# Patient Record
Sex: FEMALE | Race: WHITE | NOT HISPANIC OR LATINO | Employment: PART TIME | URBAN - METROPOLITAN AREA
[De-identification: names, ages, dates, MRNs, and addresses within clinical notes are randomized per-mention and may not be internally consistent; named-entity substitution may affect disease eponyms.]

---

## 2021-03-31 ENCOUNTER — TRANSCRIBE ORDERS (OUTPATIENT)
Dept: ADMINISTRATIVE | Facility: HOSPITAL | Age: 46
End: 2021-03-31

## 2021-03-31 DIAGNOSIS — R01.1 HEART MURMUR: Primary | ICD-10-CM

## 2021-04-28 ENCOUNTER — HOSPITAL ENCOUNTER (OUTPATIENT)
Dept: CARDIOLOGY | Facility: HOSPITAL | Age: 46
Discharge: HOME | End: 2021-04-28

## 2021-04-28 ENCOUNTER — HOSPITAL ENCOUNTER (INPATIENT)
Facility: HOSPITAL | Age: 46
LOS: 13 days | Discharge: HOME | End: 2021-05-11
Attending: INTERNAL MEDICINE | Admitting: INTERNAL MEDICINE

## 2021-04-28 ENCOUNTER — APPOINTMENT (OUTPATIENT)
Dept: CARDIOLOGY | Facility: HOSPITAL | Age: 46
End: 2021-04-28

## 2021-04-28 ENCOUNTER — APPOINTMENT (OUTPATIENT)
Dept: CT IMAGING | Facility: HOSPITAL | Age: 46
End: 2021-04-28

## 2021-04-28 VITALS
HEIGHT: 64 IN | SYSTOLIC BLOOD PRESSURE: 123 MMHG | DIASTOLIC BLOOD PRESSURE: 85 MMHG | BODY MASS INDEX: 48.49 KG/M2 | HEART RATE: 76 BPM | WEIGHT: 284 LBS

## 2021-04-28 DIAGNOSIS — Z95.2 S/P MVR (MITRAL VALVE REPLACEMENT): ICD-10-CM

## 2021-04-28 DIAGNOSIS — Z95.2 S/P AVR (AORTIC VALVE REPLACEMENT): ICD-10-CM

## 2021-04-28 DIAGNOSIS — R01.1 HEART MURMUR: ICD-10-CM

## 2021-04-28 DIAGNOSIS — I35.0 NONRHEUMATIC AORTIC VALVE STENOSIS: Primary | ICD-10-CM

## 2021-04-28 LAB
ALBUMIN SERPL-MCNC: 3.6 G/DL (ref 3.5–5.2)
ALBUMIN/GLOB SERPL: 1.1 G/DL
ALP SERPL-CCNC: 110 U/L (ref 39–117)
ALT SERPL W P-5'-P-CCNC: 17 U/L (ref 1–33)
ANION GAP SERPL CALCULATED.3IONS-SCNC: 10.7 MMOL/L (ref 5–15)
AORTIC ARCH: 2.9 CM
AORTIC DIMENSIONLESS INDEX: 0.2 (DI)
APTT PPP: 168.6 SECONDS (ref 22.7–35.4)
APTT PPP: 28.3 SECONDS (ref 22.7–35.4)
ASCENDING AORTA: 2.9 CM
AST SERPL-CCNC: 21 U/L (ref 1–32)
BASOPHILS # BLD AUTO: 0.03 10*3/MM3 (ref 0–0.2)
BASOPHILS NFR BLD AUTO: 0.5 % (ref 0–1.5)
BH CV ECHO MEAS - ACS: 1 CM
BH CV ECHO MEAS - AO ARCH DIAM (PROXIMAL TRANS.): 2.9 CM
BH CV ECHO MEAS - AO MAX PG (FULL): 92.8 MMHG
BH CV ECHO MEAS - AO MAX PG: 96 MMHG
BH CV ECHO MEAS - AO MEAN PG (FULL): 52 MMHG
BH CV ECHO MEAS - AO MEAN PG: 54 MMHG
BH CV ECHO MEAS - AO ROOT AREA (BSA CORRECTED): 1.2
BH CV ECHO MEAS - AO ROOT AREA: 5.7 CM^2
BH CV ECHO MEAS - AO ROOT DIAM: 2.7 CM
BH CV ECHO MEAS - AO V2 MAX: 489 CM/SEC
BH CV ECHO MEAS - AO V2 MEAN: 354 CM/SEC
BH CV ECHO MEAS - AO V2 VTI: 126 CM
BH CV ECHO MEAS - ASC AORTA: 2.9 CM
BH CV ECHO MEAS - AVA(I,A): 0.56 CM^2
BH CV ECHO MEAS - AVA(I,D): 0.56 CM^2
BH CV ECHO MEAS - AVA(V,A): 0.54 CM^2
BH CV ECHO MEAS - AVA(V,D): 0.54 CM^2
BH CV ECHO MEAS - BSA(HAYCOCK): 2.5 M^2
BH CV ECHO MEAS - BSA: 2.3 M^2
BH CV ECHO MEAS - BZI_BMI: 48.7 KILOGRAMS/M^2
BH CV ECHO MEAS - BZI_METRIC_HEIGHT: 162.6 CM
BH CV ECHO MEAS - BZI_METRIC_WEIGHT: 128.8 KG
BH CV ECHO MEAS - EDV(CUBED): 85.2 ML
BH CV ECHO MEAS - EDV(MOD-SP2): 118 ML
BH CV ECHO MEAS - EDV(MOD-SP4): 106 ML
BH CV ECHO MEAS - EDV(TEICH): 87.7 ML
BH CV ECHO MEAS - EF(CUBED): 61.5 %
BH CV ECHO MEAS - EF(MOD-BP): 64 %
BH CV ECHO MEAS - EF(MOD-SP2): 61.9 %
BH CV ECHO MEAS - EF(MOD-SP4): 67 %
BH CV ECHO MEAS - EF(TEICH): 53.3 %
BH CV ECHO MEAS - ESV(CUBED): 32.8 ML
BH CV ECHO MEAS - ESV(MOD-SP2): 45 ML
BH CV ECHO MEAS - ESV(MOD-SP4): 35 ML
BH CV ECHO MEAS - ESV(TEICH): 41 ML
BH CV ECHO MEAS - FS: 27.3 %
BH CV ECHO MEAS - IVS/LVPW: 1.1
BH CV ECHO MEAS - IVSD: 1.3 CM
BH CV ECHO MEAS - LAT PEAK E' VEL: 5 CM/SEC
BH CV ECHO MEAS - LV DIASTOLIC VOL/BSA (35-75): 46.7 ML/M^2
BH CV ECHO MEAS - LV MASS(C)D: 203 GRAMS
BH CV ECHO MEAS - LV MASS(C)DI: 89.4 GRAMS/M^2
BH CV ECHO MEAS - LV MAX PG: 2.8 MMHG
BH CV ECHO MEAS - LV MEAN PG: 2 MMHG
BH CV ECHO MEAS - LV SYSTOLIC VOL/BSA (12-30): 15.4 ML/M^2
BH CV ECHO MEAS - LV V1 MAX: 83.9 CM/SEC
BH CV ECHO MEAS - LV V1 MEAN: 59.4 CM/SEC
BH CV ECHO MEAS - LV V1 VTI: 22.4 CM
BH CV ECHO MEAS - LVIDD: 4.4 CM
BH CV ECHO MEAS - LVIDS: 3.2 CM
BH CV ECHO MEAS - LVLD AP2: 8.8 CM
BH CV ECHO MEAS - LVLD AP4: 8.1 CM
BH CV ECHO MEAS - LVLS AP2: 7.3 CM
BH CV ECHO MEAS - LVLS AP4: 6.4 CM
BH CV ECHO MEAS - LVOT AREA (M): 3.1 CM^2
BH CV ECHO MEAS - LVOT AREA: 3.1 CM^2
BH CV ECHO MEAS - LVOT DIAM: 2 CM
BH CV ECHO MEAS - LVPWD: 1.2 CM
BH CV ECHO MEAS - MED PEAK E' VEL: 7.3 CM/SEC
BH CV ECHO MEAS - MV A DUR: 0.19 SEC
BH CV ECHO MEAS - MV A MAX VEL: 145 CM/SEC
BH CV ECHO MEAS - MV DEC SLOPE: 535 CM/SEC^2
BH CV ECHO MEAS - MV DEC TIME: 0.18 SEC
BH CV ECHO MEAS - MV E MAX VEL: 154 CM/SEC
BH CV ECHO MEAS - MV E/A: 1.1
BH CV ECHO MEAS - MV MAX PG: 11 MMHG
BH CV ECHO MEAS - MV MEAN PG: 4 MMHG
BH CV ECHO MEAS - MV MEAN PG: 7 MMHG
BH CV ECHO MEAS - MV P1/2T MAX VEL: 163 CM/SEC
BH CV ECHO MEAS - MV P1/2T: 89.2 MSEC
BH CV ECHO MEAS - MV V2 MAX: 168 CM/SEC
BH CV ECHO MEAS - MV V2 MEAN: 122 CM/SEC
BH CV ECHO MEAS - MV V2 VTI: 41.2 CM
BH CV ECHO MEAS - MVA P1/2T LCG: 1.3 CM^2
BH CV ECHO MEAS - MVA(P1/2T): 2.5 CM^2
BH CV ECHO MEAS - MVA(VTI): 1.7 CM^2
BH CV ECHO MEAS - PA ACC TIME: 0.11 SEC
BH CV ECHO MEAS - PA MAX PG (FULL): 3.2 MMHG
BH CV ECHO MEAS - PA MAX PG: 4.8 MMHG
BH CV ECHO MEAS - PA PR(ACCEL): 29.1 MMHG
BH CV ECHO MEAS - PA V2 MAX: 110 CM/SEC
BH CV ECHO MEAS - PI END-D VEL: 179 CM/SEC
BH CV ECHO MEAS - PULM A REVS DUR: 0.19 SEC
BH CV ECHO MEAS - PULM A REVS VEL: 33.1 CM/SEC
BH CV ECHO MEAS - PULM DIAS VEL: 75.8 CM/SEC
BH CV ECHO MEAS - PULM S/D: 0.59
BH CV ECHO MEAS - PULM SYS VEL: 44.9 CM/SEC
BH CV ECHO MEAS - PVA(V,A): 1.3 CM^2
BH CV ECHO MEAS - PVA(V,D): 1.3 CM^2
BH CV ECHO MEAS - QP/QS: 0.56
BH CV ECHO MEAS - RAP SYSTOLE: 15 MMHG
BH CV ECHO MEAS - RV MAX PG: 1.6 MMHG
BH CV ECHO MEAS - RV MEAN PG: 1 MMHG
BH CV ECHO MEAS - RV V1 MAX: 63.3 CM/SEC
BH CV ECHO MEAS - RV V1 MEAN: 46.4 CM/SEC
BH CV ECHO MEAS - RV V1 VTI: 17.3 CM
BH CV ECHO MEAS - RVOT AREA: 2.3 CM^2
BH CV ECHO MEAS - RVOT DIAM: 1.7 CM
BH CV ECHO MEAS - RVSP: 44 MMHG
BH CV ECHO MEAS - SI(AO): 317.8 ML/M^2
BH CV ECHO MEAS - SI(CUBED): 23.1 ML/M^2
BH CV ECHO MEAS - SI(LVOT): 31 ML/M^2
BH CV ECHO MEAS - SI(MOD-SP2): 32.2 ML/M^2
BH CV ECHO MEAS - SI(MOD-SP4): 31.3 ML/M^2
BH CV ECHO MEAS - SI(TEICH): 20.6 ML/M^2
BH CV ECHO MEAS - SV(AO): 721.4 ML
BH CV ECHO MEAS - SV(CUBED): 52.4 ML
BH CV ECHO MEAS - SV(LVOT): 70.4 ML
BH CV ECHO MEAS - SV(MOD-SP2): 73 ML
BH CV ECHO MEAS - SV(MOD-SP4): 71 ML
BH CV ECHO MEAS - SV(RVOT): 39.3 ML
BH CV ECHO MEAS - SV(TEICH): 46.7 ML
BH CV ECHO MEAS - TR MAX VEL: 253 CM/SEC
BH CV ECHO MEASUREMENTS AVERAGE E/E' RATIO: 25.04
BH CV XLRA - RV BASE: 3 CM
BH CV XLRA - RV LENGTH: 9.4 CM
BH CV XLRA - RV MID: 2.5 CM
BH CV XLRA - TDI S': 9.8 CM/SEC
BILIRUB SERPL-MCNC: 0.7 MG/DL (ref 0–1.2)
BUN SERPL-MCNC: 15 MG/DL (ref 6–20)
BUN/CREAT SERPL: 19.5 (ref 7–25)
CALCIUM SPEC-SCNC: 8.8 MG/DL (ref 8.6–10.5)
CHLORIDE SERPL-SCNC: 99 MMOL/L (ref 98–107)
CO2 SERPL-SCNC: 28.3 MMOL/L (ref 22–29)
CREAT SERPL-MCNC: 0.77 MG/DL (ref 0.57–1)
DEPRECATED RDW RBC AUTO: 40.3 FL (ref 37–54)
EOSINOPHIL # BLD AUTO: 0.22 10*3/MM3 (ref 0–0.4)
EOSINOPHIL NFR BLD AUTO: 3.6 % (ref 0.3–6.2)
ERYTHROCYTE [DISTWIDTH] IN BLOOD BY AUTOMATED COUNT: 12.8 % (ref 12.3–15.4)
GFR SERPL CREATININE-BSD FRML MDRD: 80 ML/MIN/1.73
GLOBULIN UR ELPH-MCNC: 3.3 GM/DL
GLUCOSE BLDC GLUCOMTR-MCNC: 183 MG/DL (ref 70–130)
GLUCOSE SERPL-MCNC: 188 MG/DL (ref 65–99)
HCT VFR BLD AUTO: 46.3 % (ref 34–46.6)
HGB BLD-MCNC: 15.3 G/DL (ref 12–15.9)
INR PPP: 1.05 (ref 0.9–1.1)
LEFT ATRIUM VOLUME INDEX: 30 ML/M2
LYMPHOCYTES # BLD AUTO: 1.7 10*3/MM3 (ref 0.7–3.1)
LYMPHOCYTES NFR BLD AUTO: 27.6 % (ref 19.6–45.3)
MAXIMAL PREDICTED HEART RATE: 172 BPM
MCH RBC QN AUTO: 29 PG (ref 26.6–33)
MCHC RBC AUTO-ENTMCNC: 33 G/DL (ref 31.5–35.7)
MCV RBC AUTO: 87.9 FL (ref 79–97)
MONOCYTES # BLD AUTO: 0.3 10*3/MM3 (ref 0.1–0.9)
MONOCYTES NFR BLD AUTO: 4.9 % (ref 5–12)
NEUTROPHILS NFR BLD AUTO: 3.89 10*3/MM3 (ref 1.7–7)
NEUTROPHILS NFR BLD AUTO: 63.1 % (ref 42.7–76)
PLATELET # BLD AUTO: 163 10*3/MM3 (ref 140–450)
PMV BLD AUTO: 9.7 FL (ref 6–12)
POTASSIUM SERPL-SCNC: 4.2 MMOL/L (ref 3.5–5.2)
PROT SERPL-MCNC: 6.9 G/DL (ref 6–8.5)
PROTHROMBIN TIME: 13.5 SECONDS (ref 11.7–14.2)
QT INTERVAL: 425 MS
RBC # BLD AUTO: 5.27 10*6/MM3 (ref 3.77–5.28)
SARS-COV-2 RNA RESP QL NAA+PROBE: NOT DETECTED
SINUS: 2.6 CM
SODIUM SERPL-SCNC: 138 MMOL/L (ref 136–145)
STJ: 2.7 CM
STRESS TARGET HR: 146 BPM
TROPONIN T SERPL-MCNC: <0.01 NG/ML (ref 0–0.03)
TSH SERPL DL<=0.05 MIU/L-ACNC: 2.56 UIU/ML (ref 0.27–4.2)
WBC # BLD AUTO: 6.16 10*3/MM3 (ref 3.4–10.8)

## 2021-04-28 PROCEDURE — 71275 CT ANGIOGRAPHY CHEST: CPT

## 2021-04-28 PROCEDURE — 25010000002 HEPARIN (PORCINE) PER 1000 UNITS: Performed by: INTERNAL MEDICINE

## 2021-04-28 PROCEDURE — 99232 SBSQ HOSP IP/OBS MODERATE 35: CPT | Performed by: INTERNAL MEDICINE

## 2021-04-28 PROCEDURE — 87040 BLOOD CULTURE FOR BACTERIA: CPT | Performed by: INTERNAL MEDICINE

## 2021-04-28 PROCEDURE — 93306 TTE W/DOPPLER COMPLETE: CPT

## 2021-04-28 PROCEDURE — 84443 ASSAY THYROID STIM HORMONE: CPT | Performed by: INTERNAL MEDICINE

## 2021-04-28 PROCEDURE — 82962 GLUCOSE BLOOD TEST: CPT

## 2021-04-28 PROCEDURE — 93325 DOPPLER ECHO COLOR FLOW MAPG: CPT | Performed by: INTERNAL MEDICINE

## 2021-04-28 PROCEDURE — G0378 HOSPITAL OBSERVATION PER HR: HCPCS

## 2021-04-28 PROCEDURE — 94799 UNLISTED PULMONARY SVC/PX: CPT

## 2021-04-28 PROCEDURE — 25010000002 FENTANYL CITRATE (PF) 100 MCG/2ML SOLUTION: Performed by: INTERNAL MEDICINE

## 2021-04-28 PROCEDURE — 94640 AIRWAY INHALATION TREATMENT: CPT

## 2021-04-28 PROCEDURE — 85027 COMPLETE CBC AUTOMATED: CPT | Performed by: INTERNAL MEDICINE

## 2021-04-28 PROCEDURE — 93312 ECHO TRANSESOPHAGEAL: CPT

## 2021-04-28 PROCEDURE — 80053 COMPREHEN METABOLIC PANEL: CPT | Performed by: INTERNAL MEDICINE

## 2021-04-28 PROCEDURE — 0 IOPAMIDOL PER 1 ML: Performed by: INTERNAL MEDICINE

## 2021-04-28 PROCEDURE — 74174 CTA ABD&PLVS W/CONTRAST: CPT

## 2021-04-28 PROCEDURE — 93010 ELECTROCARDIOGRAM REPORT: CPT | Performed by: INTERNAL MEDICINE

## 2021-04-28 PROCEDURE — 85730 THROMBOPLASTIN TIME PARTIAL: CPT | Performed by: INTERNAL MEDICINE

## 2021-04-28 PROCEDURE — 93306 TTE W/DOPPLER COMPLETE: CPT | Performed by: INTERNAL MEDICINE

## 2021-04-28 PROCEDURE — U0003 INFECTIOUS AGENT DETECTION BY NUCLEIC ACID (DNA OR RNA); SEVERE ACUTE RESPIRATORY SYNDROME CORONAVIRUS 2 (SARS-COV-2) (CORONAVIRUS DISEASE [COVID-19]), AMPLIFIED PROBE TECHNIQUE, MAKING USE OF HIGH THROUGHPUT TECHNOLOGIES AS DESCRIBED BY CMS-2020-01-R: HCPCS | Performed by: INTERNAL MEDICINE

## 2021-04-28 PROCEDURE — 25010000002 MIDAZOLAM PER 1 MG: Performed by: INTERNAL MEDICINE

## 2021-04-28 PROCEDURE — 93325 DOPPLER ECHO COLOR FLOW MAPG: CPT

## 2021-04-28 PROCEDURE — 93320 DOPPLER ECHO COMPLETE: CPT | Performed by: INTERNAL MEDICINE

## 2021-04-28 PROCEDURE — 93320 DOPPLER ECHO COMPLETE: CPT

## 2021-04-28 PROCEDURE — 99152 MOD SED SAME PHYS/QHP 5/>YRS: CPT

## 2021-04-28 PROCEDURE — 93005 ELECTROCARDIOGRAM TRACING: CPT | Performed by: INTERNAL MEDICINE

## 2021-04-28 PROCEDURE — 84484 ASSAY OF TROPONIN QUANT: CPT | Performed by: INTERNAL MEDICINE

## 2021-04-28 PROCEDURE — 93312 ECHO TRANSESOPHAGEAL: CPT | Performed by: INTERNAL MEDICINE

## 2021-04-28 PROCEDURE — 99153 MOD SED SAME PHYS/QHP EA: CPT

## 2021-04-28 PROCEDURE — 85610 PROTHROMBIN TIME: CPT | Performed by: INTERNAL MEDICINE

## 2021-04-28 RX ORDER — METFORMIN HYDROCHLORIDE 750 MG/1
750 TABLET, EXTENDED RELEASE ORAL 2 TIMES DAILY
COMMUNITY
End: 2022-11-22 | Stop reason: ALTCHOICE

## 2021-04-28 RX ORDER — BUDESONIDE AND FORMOTEROL FUMARATE DIHYDRATE 160; 4.5 UG/1; UG/1
2 AEROSOL RESPIRATORY (INHALATION)
Status: DISCONTINUED | OUTPATIENT
Start: 2021-04-28 | End: 2021-05-03

## 2021-04-28 RX ORDER — MIDAZOLAM HYDROCHLORIDE 1 MG/ML
INJECTION INTRAMUSCULAR; INTRAVENOUS
Status: COMPLETED | OUTPATIENT
Start: 2021-04-28 | End: 2021-04-28

## 2021-04-28 RX ORDER — ONDANSETRON 2 MG/ML
4 INJECTION INTRAMUSCULAR; INTRAVENOUS EVERY 6 HOURS PRN
Status: DISCONTINUED | OUTPATIENT
Start: 2021-04-28 | End: 2021-05-03

## 2021-04-28 RX ORDER — TRIAMTERENE AND HYDROCHLOROTHIAZIDE 37.5; 25 MG/1; MG/1
1 TABLET ORAL DAILY
Status: DISCONTINUED | OUTPATIENT
Start: 2021-04-28 | End: 2021-05-03

## 2021-04-28 RX ORDER — HEPARIN SODIUM 5000 [USP'U]/ML
40-77.5 INJECTION, SOLUTION INTRAVENOUS; SUBCUTANEOUS EVERY 6 HOURS PRN
Status: DISCONTINUED | OUTPATIENT
Start: 2021-04-28 | End: 2021-04-30

## 2021-04-28 RX ORDER — TRIAMTERENE AND HYDROCHLOROTHIAZIDE 37.5; 25 MG/1; MG/1
1 CAPSULE ORAL EVERY MORNING
COMMUNITY
End: 2021-05-11 | Stop reason: HOSPADM

## 2021-04-28 RX ORDER — DEXTROSE MONOHYDRATE 25 G/50ML
25 INJECTION, SOLUTION INTRAVENOUS
Status: DISCONTINUED | OUTPATIENT
Start: 2021-04-28 | End: 2021-05-03

## 2021-04-28 RX ORDER — SODIUM CHLORIDE 0.9 % (FLUSH) 0.9 %
10 SYRINGE (ML) INJECTION EVERY 12 HOURS SCHEDULED
Status: DISCONTINUED | OUTPATIENT
Start: 2021-04-28 | End: 2021-05-03

## 2021-04-28 RX ORDER — FENTANYL CITRATE 50 UG/ML
INJECTION, SOLUTION INTRAMUSCULAR; INTRAVENOUS
Status: COMPLETED | OUTPATIENT
Start: 2021-04-28 | End: 2021-04-28

## 2021-04-28 RX ORDER — NICOTINE 21 MG/24HR
1 PATCH, TRANSDERMAL 24 HOURS TRANSDERMAL
Status: DISCONTINUED | OUTPATIENT
Start: 2021-04-28 | End: 2021-05-03

## 2021-04-28 RX ORDER — SIMVASTATIN 80 MG
80 TABLET ORAL NIGHTLY
COMMUNITY
End: 2021-05-11 | Stop reason: HOSPADM

## 2021-04-28 RX ORDER — ALPRAZOLAM 0.25 MG/1
0.25 TABLET ORAL 4 TIMES DAILY PRN
Status: DISCONTINUED | OUTPATIENT
Start: 2021-04-28 | End: 2021-05-03

## 2021-04-28 RX ORDER — HYDROMORPHONE HYDROCHLORIDE 1 MG/ML
0.5 INJECTION, SOLUTION INTRAMUSCULAR; INTRAVENOUS; SUBCUTANEOUS
Status: DISCONTINUED | OUTPATIENT
Start: 2021-04-28 | End: 2021-05-03

## 2021-04-28 RX ORDER — SODIUM CHLORIDE 9 MG/ML
INJECTION, SOLUTION INTRAVENOUS
Status: COMPLETED | OUTPATIENT
Start: 2021-04-28 | End: 2021-04-28

## 2021-04-28 RX ORDER — AMLODIPINE BESYLATE 2.5 MG/1
2.5 TABLET ORAL DAILY
Status: DISCONTINUED | OUTPATIENT
Start: 2021-04-28 | End: 2021-05-03

## 2021-04-28 RX ORDER — BUDESONIDE AND FORMOTEROL FUMARATE DIHYDRATE 160; 4.5 UG/1; UG/1
2 AEROSOL RESPIRATORY (INHALATION)
COMMUNITY

## 2021-04-28 RX ORDER — AMLODIPINE BESYLATE 2.5 MG/1
2.5 TABLET ORAL DAILY
COMMUNITY
End: 2021-05-11 | Stop reason: HOSPADM

## 2021-04-28 RX ORDER — LIDOCAINE HYDROCHLORIDE 20 MG/ML
SOLUTION OROPHARYNGEAL
Status: COMPLETED | OUTPATIENT
Start: 2021-04-28 | End: 2021-04-28

## 2021-04-28 RX ORDER — NICOTINE POLACRILEX 4 MG
15 LOZENGE BUCCAL
Status: DISCONTINUED | OUTPATIENT
Start: 2021-04-28 | End: 2021-05-03

## 2021-04-28 RX ORDER — HEPARIN SODIUM 10000 [USP'U]/100ML
18 INJECTION, SOLUTION INTRAVENOUS
Status: DISCONTINUED | OUTPATIENT
Start: 2021-04-28 | End: 2021-04-30

## 2021-04-28 RX ORDER — ACETAMINOPHEN 325 MG/1
650 TABLET ORAL EVERY 6 HOURS PRN
Status: DISCONTINUED | OUTPATIENT
Start: 2021-04-28 | End: 2021-05-01

## 2021-04-28 RX ORDER — ATORVASTATIN CALCIUM 20 MG/1
40 TABLET, FILM COATED ORAL DAILY
Status: DISCONTINUED | OUTPATIENT
Start: 2021-04-28 | End: 2021-05-03

## 2021-04-28 RX ORDER — SODIUM CHLORIDE 0.9 % (FLUSH) 0.9 %
10 SYRINGE (ML) INJECTION AS NEEDED
Status: DISCONTINUED | OUTPATIENT
Start: 2021-04-28 | End: 2021-05-03

## 2021-04-28 RX ADMIN — MIDAZOLAM 2 MG: 1 INJECTION INTRAMUSCULAR; INTRAVENOUS at 14:31

## 2021-04-28 RX ADMIN — SODIUM CHLORIDE, PRESERVATIVE FREE 10 ML: 5 INJECTION INTRAVENOUS at 20:20

## 2021-04-28 RX ADMIN — HEPARIN SODIUM 18 UNITS/KG/HR: 10000 INJECTION, SOLUTION INTRAVENOUS at 15:37

## 2021-04-28 RX ADMIN — MIDAZOLAM 2 MG: 1 INJECTION INTRAMUSCULAR; INTRAVENOUS at 14:12

## 2021-04-28 RX ADMIN — ATORVASTATIN CALCIUM 40 MG: 20 TABLET, FILM COATED ORAL at 20:08

## 2021-04-28 RX ADMIN — SODIUM CHLORIDE 50 ML/HR: 9 INJECTION, SOLUTION INTRAVENOUS at 14:03

## 2021-04-28 RX ADMIN — TRIAMTERENE AND HYDROCHLOROTHIAZIDE 1 TABLET: 37.5; 25 TABLET ORAL at 20:15

## 2021-04-28 RX ADMIN — LIDOCAINE HYDROCHLORIDE 10 ML: 20 SOLUTION ORAL; TOPICAL at 14:03

## 2021-04-28 RX ADMIN — MIDAZOLAM 2 MG: 1 INJECTION INTRAMUSCULAR; INTRAVENOUS at 14:11

## 2021-04-28 RX ADMIN — LINAGLIPTIN 5 MG: 5 TABLET, FILM COATED ORAL at 20:15

## 2021-04-28 RX ADMIN — AMLODIPINE BESYLATE 2.5 MG: 2.5 TABLET ORAL at 20:08

## 2021-04-28 RX ADMIN — IOPAMIDOL 95 ML: 755 INJECTION, SOLUTION INTRAVENOUS at 17:56

## 2021-04-28 RX ADMIN — MIDAZOLAM 1 MG: 1 INJECTION INTRAMUSCULAR; INTRAVENOUS at 14:14

## 2021-04-28 RX ADMIN — BUDESONIDE AND FORMOTEROL FUMARATE DIHYDRATE 2 PUFF: 160; 4.5 AEROSOL RESPIRATORY (INHALATION) at 20:32

## 2021-04-28 RX ADMIN — FENTANYL CITRATE 25 MCG: 50 INJECTION INTRAMUSCULAR; INTRAVENOUS at 14:11

## 2021-04-28 RX ADMIN — MIDAZOLAM 1 MG: 1 INJECTION INTRAMUSCULAR; INTRAVENOUS at 14:38

## 2021-04-29 ENCOUNTER — APPOINTMENT (OUTPATIENT)
Dept: CARDIOLOGY | Facility: HOSPITAL | Age: 46
End: 2021-04-29

## 2021-04-29 LAB
ALBUMIN SERPL-MCNC: 3.2 G/DL (ref 3.5–5.2)
ALP SERPL-CCNC: 106 U/L (ref 39–117)
ALT SERPL W P-5'-P-CCNC: 15 U/L (ref 1–33)
ANION GAP SERPL CALCULATED.3IONS-SCNC: 9.5 MMOL/L (ref 5–15)
APTT PPP: 108.7 SECONDS (ref 22.7–35.4)
APTT PPP: 71.4 SECONDS (ref 22.7–35.4)
APTT PPP: 87.1 SECONDS (ref 22.7–35.4)
ARTERIAL PATENCY WRIST A: POSITIVE
AST SERPL-CCNC: 26 U/L (ref 1–32)
ATMOSPHERIC PRESS: 745.1 MMHG
BASE EXCESS BLDA CALC-SCNC: 2.2 MMOL/L (ref 0–2)
BASOPHILS # BLD AUTO: 0.04 10*3/MM3 (ref 0–0.2)
BASOPHILS NFR BLD AUTO: 0.6 % (ref 0–1.5)
BDY SITE: ABNORMAL
BH CV LOWER VASCULAR LEFT COMMON FEMORAL AUGMENT: NORMAL
BH CV LOWER VASCULAR LEFT COMMON FEMORAL COMPETENT: NORMAL
BH CV LOWER VASCULAR LEFT COMMON FEMORAL COMPRESS: NORMAL
BH CV LOWER VASCULAR LEFT COMMON FEMORAL PHASIC: NORMAL
BH CV LOWER VASCULAR LEFT COMMON FEMORAL SPONT: NORMAL
BH CV LOWER VASCULAR LEFT DISTAL FEMORAL COMPRESS: NORMAL
BH CV LOWER VASCULAR LEFT GASTRONEMIUS COMPRESS: NORMAL
BH CV LOWER VASCULAR LEFT GREATER SAPH AK COMPRESS: NORMAL
BH CV LOWER VASCULAR LEFT GREATER SAPH BK COMPRESS: NORMAL
BH CV LOWER VASCULAR LEFT LESSER SAPH COMPRESS: NORMAL
BH CV LOWER VASCULAR LEFT MID FEMORAL AUGMENT: NORMAL
BH CV LOWER VASCULAR LEFT MID FEMORAL COMPETENT: NORMAL
BH CV LOWER VASCULAR LEFT MID FEMORAL COMPRESS: NORMAL
BH CV LOWER VASCULAR LEFT MID FEMORAL PHASIC: NORMAL
BH CV LOWER VASCULAR LEFT MID FEMORAL SPONT: NORMAL
BH CV LOWER VASCULAR LEFT PERONEAL COMPRESS: NORMAL
BH CV LOWER VASCULAR LEFT POPLITEAL AUGMENT: NORMAL
BH CV LOWER VASCULAR LEFT POPLITEAL COMPETENT: NORMAL
BH CV LOWER VASCULAR LEFT POPLITEAL COMPRESS: NORMAL
BH CV LOWER VASCULAR LEFT POPLITEAL PHASIC: NORMAL
BH CV LOWER VASCULAR LEFT POPLITEAL SPONT: NORMAL
BH CV LOWER VASCULAR LEFT POSTERIOR TIBIAL COMPRESS: NORMAL
BH CV LOWER VASCULAR LEFT PROFUNDA FEMORAL COMPRESS: NORMAL
BH CV LOWER VASCULAR LEFT PROXIMAL FEMORAL COMPRESS: NORMAL
BH CV LOWER VASCULAR LEFT SAPHENOFEMORAL JUNCTION COMPRESS: NORMAL
BH CV LOWER VASCULAR RIGHT COMMON FEMORAL AUGMENT: NORMAL
BH CV LOWER VASCULAR RIGHT COMMON FEMORAL COMPETENT: NORMAL
BH CV LOWER VASCULAR RIGHT COMMON FEMORAL COMPRESS: NORMAL
BH CV LOWER VASCULAR RIGHT COMMON FEMORAL PHASIC: NORMAL
BH CV LOWER VASCULAR RIGHT COMMON FEMORAL SPONT: NORMAL
BH CV LOWER VASCULAR RIGHT DISTAL FEMORAL COMPRESS: NORMAL
BH CV LOWER VASCULAR RIGHT GASTRONEMIUS COMPRESS: NORMAL
BH CV LOWER VASCULAR RIGHT GREATER SAPH AK COMPRESS: NORMAL
BH CV LOWER VASCULAR RIGHT GREATER SAPH BK COMPRESS: NORMAL
BH CV LOWER VASCULAR RIGHT LESSER SAPH COMPRESS: NORMAL
BH CV LOWER VASCULAR RIGHT MID FEMORAL AUGMENT: NORMAL
BH CV LOWER VASCULAR RIGHT MID FEMORAL COMPETENT: NORMAL
BH CV LOWER VASCULAR RIGHT MID FEMORAL COMPRESS: NORMAL
BH CV LOWER VASCULAR RIGHT MID FEMORAL PHASIC: NORMAL
BH CV LOWER VASCULAR RIGHT MID FEMORAL SPONT: NORMAL
BH CV LOWER VASCULAR RIGHT PERONEAL COMPRESS: NORMAL
BH CV LOWER VASCULAR RIGHT POPLITEAL AUGMENT: NORMAL
BH CV LOWER VASCULAR RIGHT POPLITEAL COMPETENT: NORMAL
BH CV LOWER VASCULAR RIGHT POPLITEAL COMPRESS: NORMAL
BH CV LOWER VASCULAR RIGHT POPLITEAL PHASIC: NORMAL
BH CV LOWER VASCULAR RIGHT POPLITEAL SPONT: NORMAL
BH CV LOWER VASCULAR RIGHT POSTERIOR TIBIAL COMPRESS: NORMAL
BH CV LOWER VASCULAR RIGHT PROFUNDA FEMORAL COMPRESS: NORMAL
BH CV LOWER VASCULAR RIGHT PROXIMAL FEMORAL COMPRESS: NORMAL
BH CV LOWER VASCULAR RIGHT SAPHENOFEMORAL JUNCTION COMPRESS: NORMAL
BILIRUB CONJ SERPL-MCNC: <0.2 MG/DL (ref 0–0.3)
BILIRUB INDIRECT SERPL-MCNC: ABNORMAL MG/DL
BILIRUB SERPL-MCNC: 0.6 MG/DL (ref 0–1.2)
BILIRUB UR QL STRIP: NEGATIVE
BUN SERPL-MCNC: 12 MG/DL (ref 6–20)
BUN/CREAT SERPL: 17.6 (ref 7–25)
CALCIUM SPEC-SCNC: 8.6 MG/DL (ref 8.6–10.5)
CERULOPLASMIN SERPL-MCNC: 22 MG/DL (ref 19–39)
CHLORIDE SERPL-SCNC: 100 MMOL/L (ref 98–107)
CHOLEST SERPL-MCNC: 178 MG/DL (ref 0–200)
CLARITY UR: CLEAR
CLOSE TME COLL+ADP + EPINEP PNL BLD: 94 % (ref 86–100)
CO2 SERPL-SCNC: 23.5 MMOL/L (ref 22–29)
COLOR UR: YELLOW
CREAT SERPL-MCNC: 0.68 MG/DL (ref 0.57–1)
D DIMER PPP FEU-MCNC: <0.27 MCGFEU/ML (ref 0–0.49)
DEPRECATED RDW RBC AUTO: 39.6 FL (ref 37–54)
EOSINOPHIL # BLD AUTO: 0.25 10*3/MM3 (ref 0–0.4)
EOSINOPHIL NFR BLD AUTO: 3.6 % (ref 0.3–6.2)
ERYTHROCYTE [DISTWIDTH] IN BLOOD BY AUTOMATED COUNT: 12.6 % (ref 12.3–15.4)
GAS FLOW AIRWAY: 2 LPM
GFR SERPL CREATININE-BSD FRML MDRD: 92 ML/MIN/1.73
GLUCOSE BLDC GLUCOMTR-MCNC: 182 MG/DL (ref 70–130)
GLUCOSE BLDC GLUCOMTR-MCNC: 185 MG/DL (ref 70–130)
GLUCOSE BLDC GLUCOMTR-MCNC: 215 MG/DL (ref 70–130)
GLUCOSE SERPL-MCNC: 187 MG/DL (ref 65–99)
GLUCOSE UR STRIP-MCNC: ABNORMAL MG/DL
HBA1C MFR BLD: 8.87 % (ref 4.8–5.6)
HCO3 BLDA-SCNC: 26.7 MMOL/L (ref 22–28)
HCT VFR BLD AUTO: 43.9 % (ref 34–46.6)
HDLC SERPL-MCNC: 34 MG/DL (ref 40–60)
HGB BLD-MCNC: 14.2 G/DL (ref 12–15.9)
HGB UR QL STRIP.AUTO: NEGATIVE
IMM GRANULOCYTES # BLD AUTO: 0.03 10*3/MM3 (ref 0–0.05)
IMM GRANULOCYTES NFR BLD AUTO: 0.4 % (ref 0–0.5)
INR PPP: 1.07 (ref 0.9–1.1)
KETONES UR QL STRIP: NEGATIVE
LDLC SERPL CALC-MCNC: 121 MG/DL (ref 0–100)
LDLC/HDLC SERPL: 3.5 {RATIO}
LEUKOCYTE ESTERASE UR QL STRIP.AUTO: NEGATIVE
LYMPHOCYTES # BLD AUTO: 1.88 10*3/MM3 (ref 0.7–3.1)
LYMPHOCYTES NFR BLD AUTO: 27.2 % (ref 19.6–45.3)
MCH RBC QN AUTO: 28.2 PG (ref 26.6–33)
MCHC RBC AUTO-ENTMCNC: 32.3 G/DL (ref 31.5–35.7)
MCV RBC AUTO: 87.3 FL (ref 79–97)
MODALITY: ABNORMAL
MONOCYTES # BLD AUTO: 0.36 10*3/MM3 (ref 0.1–0.9)
MONOCYTES NFR BLD AUTO: 5.2 % (ref 5–12)
NEUTROPHILS NFR BLD AUTO: 4.36 10*3/MM3 (ref 1.7–7)
NEUTROPHILS NFR BLD AUTO: 63 % (ref 42.7–76)
NITRITE UR QL STRIP: NEGATIVE
NRBC BLD AUTO-RTO: 0 /100 WBC (ref 0–0.2)
PCO2 BLDA: 40.1 MM HG (ref 35–45)
PH BLDA: 7.43 PH UNITS (ref 7.35–7.45)
PH UR STRIP.AUTO: 6.5 [PH] (ref 5–8)
PLATELET # BLD AUTO: 160 10*3/MM3 (ref 140–450)
PMV BLD AUTO: 9.5 FL (ref 6–12)
PO2 BLDA: 59.4 MM HG (ref 80–100)
POTASSIUM SERPL-SCNC: 4.9 MMOL/L (ref 3.5–5.2)
PROT SERPL-MCNC: 6.4 G/DL (ref 6–8.5)
PROT UR QL STRIP: NEGATIVE
PROTHROMBIN TIME: 13.7 SECONDS (ref 11.7–14.2)
RBC # BLD AUTO: 5.03 10*6/MM3 (ref 3.77–5.28)
SAO2 % BLDCOA: 91 % (ref 92–99)
SODIUM SERPL-SCNC: 133 MMOL/L (ref 136–145)
SP GR UR STRIP: 1.02 (ref 1–1.03)
TOTAL RATE: 18 BREATHS/MINUTE
TRIGL SERPL-MCNC: 125 MG/DL (ref 0–150)
UROBILINOGEN UR QL STRIP: ABNORMAL
VLDLC SERPL-MCNC: 23 MG/DL (ref 5–40)
WBC # BLD AUTO: 6.92 10*3/MM3 (ref 3.4–10.8)

## 2021-04-29 PROCEDURE — 80061 LIPID PANEL: CPT | Performed by: NURSE PRACTITIONER

## 2021-04-29 PROCEDURE — 85303 CLOT INHIBIT PROT C ACTIVITY: CPT | Performed by: INTERNAL MEDICINE

## 2021-04-29 PROCEDURE — 85705 THROMBOPLASTIN INHIBITION: CPT | Performed by: INTERNAL MEDICINE

## 2021-04-29 PROCEDURE — 81003 URINALYSIS AUTO W/O SCOPE: CPT | Performed by: NURSE PRACTITIONER

## 2021-04-29 PROCEDURE — 93970 EXTREMITY STUDY: CPT

## 2021-04-29 PROCEDURE — 85305 CLOT INHIBIT PROT S TOTAL: CPT | Performed by: INTERNAL MEDICINE

## 2021-04-29 PROCEDURE — 85732 THROMBOPLASTIN TIME PARTIAL: CPT | Performed by: INTERNAL MEDICINE

## 2021-04-29 PROCEDURE — 81240 F2 GENE: CPT | Performed by: INTERNAL MEDICINE

## 2021-04-29 PROCEDURE — 36600 WITHDRAWAL OF ARTERIAL BLOOD: CPT

## 2021-04-29 PROCEDURE — 82962 GLUCOSE BLOOD TEST: CPT

## 2021-04-29 PROCEDURE — 80076 HEPATIC FUNCTION PANEL: CPT | Performed by: NURSE PRACTITIONER

## 2021-04-29 PROCEDURE — 85300 ANTITHROMBIN III ACTIVITY: CPT | Performed by: INTERNAL MEDICINE

## 2021-04-29 PROCEDURE — 85670 THROMBIN TIME PLASMA: CPT | Performed by: INTERNAL MEDICINE

## 2021-04-29 PROCEDURE — 85576 BLOOD PLATELET AGGREGATION: CPT | Performed by: NURSE PRACTITIONER

## 2021-04-29 PROCEDURE — 99233 SBSQ HOSP IP/OBS HIGH 50: CPT | Performed by: INTERNAL MEDICINE

## 2021-04-29 PROCEDURE — 85302 CLOT INHIBIT PROT C ANTIGEN: CPT | Performed by: INTERNAL MEDICINE

## 2021-04-29 PROCEDURE — 86147 CARDIOLIPIN ANTIBODY EA IG: CPT | Performed by: INTERNAL MEDICINE

## 2021-04-29 PROCEDURE — 85613 RUSSELL VIPER VENOM DILUTED: CPT | Performed by: INTERNAL MEDICINE

## 2021-04-29 PROCEDURE — 85379 FIBRIN DEGRADATION QUANT: CPT | Performed by: INTERNAL MEDICINE

## 2021-04-29 PROCEDURE — 83036 HEMOGLOBIN GLYCOSYLATED A1C: CPT | Performed by: NURSE PRACTITIONER

## 2021-04-29 PROCEDURE — 82390 ASSAY OF CERULOPLASMIN: CPT | Performed by: INTERNAL MEDICINE

## 2021-04-29 PROCEDURE — 85306 CLOT INHIBIT PROT S FREE: CPT | Performed by: INTERNAL MEDICINE

## 2021-04-29 PROCEDURE — 99222 1ST HOSP IP/OBS MODERATE 55: CPT | Performed by: INTERNAL MEDICINE

## 2021-04-29 PROCEDURE — 94799 UNLISTED PULMONARY SVC/PX: CPT

## 2021-04-29 PROCEDURE — 86038 ANTINUCLEAR ANTIBODIES: CPT | Performed by: INTERNAL MEDICINE

## 2021-04-29 PROCEDURE — 81241 F5 GENE: CPT | Performed by: INTERNAL MEDICINE

## 2021-04-29 PROCEDURE — 80048 BASIC METABOLIC PNL TOTAL CA: CPT | Performed by: INTERNAL MEDICINE

## 2021-04-29 PROCEDURE — 25010000002 HEPARIN (PORCINE) PER 1000 UNITS: Performed by: INTERNAL MEDICINE

## 2021-04-29 PROCEDURE — 85025 COMPLETE CBC W/AUTO DIFF WBC: CPT | Performed by: INTERNAL MEDICINE

## 2021-04-29 PROCEDURE — 82803 BLOOD GASES ANY COMBINATION: CPT

## 2021-04-29 PROCEDURE — 86146 BETA-2 GLYCOPROTEIN ANTIBODY: CPT | Performed by: INTERNAL MEDICINE

## 2021-04-29 PROCEDURE — 99254 IP/OBS CNSLTJ NEW/EST MOD 60: CPT | Performed by: INTERNAL MEDICINE

## 2021-04-29 PROCEDURE — 85730 THROMBOPLASTIN TIME PARTIAL: CPT | Performed by: THORACIC SURGERY (CARDIOTHORACIC VASCULAR SURGERY)

## 2021-04-29 PROCEDURE — 85610 PROTHROMBIN TIME: CPT | Performed by: NURSE PRACTITIONER

## 2021-04-29 PROCEDURE — 85730 THROMBOPLASTIN TIME PARTIAL: CPT | Performed by: INTERNAL MEDICINE

## 2021-04-29 RX ORDER — SODIUM CHLORIDE 9 MG/ML
75 INJECTION, SOLUTION INTRAVENOUS CONTINUOUS
Status: DISCONTINUED | OUTPATIENT
Start: 2021-04-29 | End: 2021-05-03

## 2021-04-29 RX ADMIN — SODIUM CHLORIDE 75 ML/HR: 9 INJECTION, SOLUTION INTRAVENOUS at 20:52

## 2021-04-29 RX ADMIN — SODIUM CHLORIDE, PRESERVATIVE FREE 10 ML: 5 INJECTION INTRAVENOUS at 08:22

## 2021-04-29 RX ADMIN — HEPARIN SODIUM 13 UNITS/KG/HR: 10000 INJECTION, SOLUTION INTRAVENOUS at 22:01

## 2021-04-29 RX ADMIN — AMLODIPINE BESYLATE 2.5 MG: 2.5 TABLET ORAL at 08:22

## 2021-04-29 RX ADMIN — HEPARIN SODIUM 15 UNITS/KG/HR: 10000 INJECTION, SOLUTION INTRAVENOUS at 05:08

## 2021-04-29 RX ADMIN — LINAGLIPTIN 5 MG: 5 TABLET, FILM COATED ORAL at 08:22

## 2021-04-29 RX ADMIN — SODIUM CHLORIDE, PRESERVATIVE FREE 10 ML: 5 INJECTION INTRAVENOUS at 21:31

## 2021-04-29 RX ADMIN — BUDESONIDE AND FORMOTEROL FUMARATE DIHYDRATE 2 PUFF: 160; 4.5 AEROSOL RESPIRATORY (INHALATION) at 08:32

## 2021-04-29 RX ADMIN — NICOTINE 1 PATCH: 21 PATCH, EXTENDED RELEASE TRANSDERMAL at 08:21

## 2021-04-29 RX ADMIN — BUDESONIDE AND FORMOTEROL FUMARATE DIHYDRATE 2 PUFF: 160; 4.5 AEROSOL RESPIRATORY (INHALATION) at 20:12

## 2021-04-29 RX ADMIN — ATORVASTATIN CALCIUM 40 MG: 20 TABLET, FILM COATED ORAL at 08:22

## 2021-04-29 RX ADMIN — TRIAMTERENE AND HYDROCHLOROTHIAZIDE 1 TABLET: 37.5; 25 TABLET ORAL at 12:57

## 2021-04-30 ENCOUNTER — APPOINTMENT (OUTPATIENT)
Dept: MRI IMAGING | Facility: HOSPITAL | Age: 46
End: 2021-04-30

## 2021-04-30 ENCOUNTER — APPOINTMENT (OUTPATIENT)
Dept: CARDIOLOGY | Facility: HOSPITAL | Age: 46
End: 2021-04-30

## 2021-04-30 LAB
ALBUMIN SERPL-MCNC: 3.6 G/DL (ref 3.5–5.2)
ALBUMIN/GLOB SERPL: 1.4 G/DL
ALP SERPL-CCNC: 98 U/L (ref 39–117)
ALPHA-FETOPROTEIN: 5.34 NG/ML (ref 0–8.3)
ALT SERPL W P-5'-P-CCNC: 13 U/L (ref 1–33)
AMMONIA BLD-SCNC: 26 UMOL/L (ref 11–51)
ANA SER QL: NEGATIVE
ANION GAP SERPL CALCULATED.3IONS-SCNC: 11.1 MMOL/L (ref 5–15)
APTT PPP: 103.3 SECONDS (ref 22.7–35.4)
APTT PPP: 82.9 SECONDS (ref 22.7–35.4)
AST SERPL-CCNC: 17 U/L (ref 1–32)
AT III PPP CHRO-ACNC: 63 % (ref 90–134)
BASOPHILS # BLD AUTO: 0.02 10*3/MM3 (ref 0–0.2)
BASOPHILS NFR BLD AUTO: 0.3 % (ref 0–1.5)
BH CV VAS HEPATIC PORTAL VEIN DIAMETER: 0.85 CM
BH CV VAS SMA HEPATIC EDV: 14 CM/S
BH CV VAS SMA HEPATIC PSV: 46 CM/S
BH CV VAS SMA SPLENIC EDV: 43 CM/S
BH CV VAS SMA SPLENIC PSV: 130 CM/S
BILIRUB SERPL-MCNC: 0.6 MG/DL (ref 0–1.2)
BUN SERPL-MCNC: 14 MG/DL (ref 6–20)
BUN/CREAT SERPL: 17.5 (ref 7–25)
CALCIUM SPEC-SCNC: 8.7 MG/DL (ref 8.6–10.5)
CARDIOLIPIN IGG SER IA-ACNC: <9 GPL U/ML (ref 0–14)
CARDIOLIPIN IGM SER IA-ACNC: 10 MPL U/ML (ref 0–12)
CHLORIDE SERPL-SCNC: 99 MMOL/L (ref 98–107)
CO2 SERPL-SCNC: 22.9 MMOL/L (ref 22–29)
CREAT SERPL-MCNC: 0.8 MG/DL (ref 0.57–1)
DEPRECATED RDW RBC AUTO: 38.8 FL (ref 37–54)
EOSINOPHIL # BLD AUTO: 0.22 10*3/MM3 (ref 0–0.4)
EOSINOPHIL NFR BLD AUTO: 3.2 % (ref 0.3–6.2)
ERYTHROCYTE [DISTWIDTH] IN BLOOD BY AUTOMATED COUNT: 12.6 % (ref 12.3–15.4)
F5 GENE MUT ANL BLD/T: NORMAL
FACTOR II, DNA ANALYSIS: NORMAL
FERRITIN SERPL-MCNC: 264 NG/ML (ref 13–150)
GFR SERPL CREATININE-BSD FRML MDRD: 77 ML/MIN/1.73
GLOBULIN UR ELPH-MCNC: 2.6 GM/DL
GLUCOSE BLDC GLUCOMTR-MCNC: 152 MG/DL (ref 70–130)
GLUCOSE BLDC GLUCOMTR-MCNC: 164 MG/DL (ref 70–130)
GLUCOSE BLDC GLUCOMTR-MCNC: 186 MG/DL (ref 70–130)
GLUCOSE BLDC GLUCOMTR-MCNC: 206 MG/DL (ref 70–130)
GLUCOSE BLDC GLUCOMTR-MCNC: 246 MG/DL (ref 70–130)
GLUCOSE SERPL-MCNC: 200 MG/DL (ref 65–99)
HAV IGM SERPL QL IA: NORMAL
HBV CORE IGM SERPL QL IA: NORMAL
HBV SURFACE AB SER RIA-ACNC: NORMAL
HBV SURFACE AG SERPL QL IA: NORMAL
HCT VFR BLD AUTO: 42.4 % (ref 34–46.6)
HCV AB SER DONR QL: NORMAL
HGB BLD-MCNC: 14 G/DL (ref 12–15.9)
IMM GRANULOCYTES # BLD AUTO: 0.02 10*3/MM3 (ref 0–0.05)
IMM GRANULOCYTES NFR BLD AUTO: 0.3 % (ref 0–0.5)
INR PPP: 1.07 (ref 0.9–1.1)
IRON 24H UR-MRATE: 113 MCG/DL (ref 37–145)
IRON SATN MFR SERPL: 39 % (ref 20–50)
LYMPHOCYTES # BLD AUTO: 1.91 10*3/MM3 (ref 0.7–3.1)
LYMPHOCYTES NFR BLD AUTO: 28.1 % (ref 19.6–45.3)
MAXIMAL PREDICTED HEART RATE: 172 BPM
MCH RBC QN AUTO: 28.3 PG (ref 26.6–33)
MCHC RBC AUTO-ENTMCNC: 33 G/DL (ref 31.5–35.7)
MCV RBC AUTO: 85.8 FL (ref 79–97)
MONOCYTES # BLD AUTO: 0.37 10*3/MM3 (ref 0.1–0.9)
MONOCYTES NFR BLD AUTO: 5.4 % (ref 5–12)
NEUTROPHILS NFR BLD AUTO: 4.25 10*3/MM3 (ref 1.7–7)
NEUTROPHILS NFR BLD AUTO: 62.7 % (ref 42.7–76)
NRBC BLD AUTO-RTO: 0 /100 WBC (ref 0–0.2)
PLATELET # BLD AUTO: 157 10*3/MM3 (ref 140–450)
PMV BLD AUTO: 9.7 FL (ref 6–12)
POTASSIUM SERPL-SCNC: 3.9 MMOL/L (ref 3.5–5.2)
PROT C ACT/NOR PPP: 65 % (ref 86–163)
PROT S ACT/NOR PPP: 118 % (ref 70–127)
PROT S FREE PPP-ACNC: 115 % (ref 49–138)
PROT SERPL-MCNC: 6.2 G/DL (ref 6–8.5)
PROTHROMBIN TIME: 13.8 SECONDS (ref 11.7–14.2)
RBC # BLD AUTO: 4.94 10*6/MM3 (ref 3.77–5.28)
SODIUM SERPL-SCNC: 133 MMOL/L (ref 136–145)
STRESS TARGET HR: 146 BPM
TIBC SERPL-MCNC: 292 MCG/DL (ref 298–536)
TRANSFERRIN SERPL-MCNC: 196 MG/DL (ref 200–360)
WBC # BLD AUTO: 6.79 10*3/MM3 (ref 3.4–10.8)

## 2021-04-30 PROCEDURE — A9577 INJ MULTIHANCE: HCPCS | Performed by: INTERNAL MEDICINE

## 2021-04-30 PROCEDURE — 94799 UNLISTED PULMONARY SVC/PX: CPT

## 2021-04-30 PROCEDURE — 82105 ALPHA-FETOPROTEIN SERUM: CPT | Performed by: INTERNAL MEDICINE

## 2021-04-30 PROCEDURE — 85610 PROTHROMBIN TIME: CPT | Performed by: INTERNAL MEDICINE

## 2021-04-30 PROCEDURE — C1769 GUIDE WIRE: HCPCS | Performed by: INTERNAL MEDICINE

## 2021-04-30 PROCEDURE — C1894 INTRO/SHEATH, NON-LASER: HCPCS | Performed by: INTERNAL MEDICINE

## 2021-04-30 PROCEDURE — 99232 SBSQ HOSP IP/OBS MODERATE 35: CPT | Performed by: INTERNAL MEDICINE

## 2021-04-30 PROCEDURE — 83516 IMMUNOASSAY NONANTIBODY: CPT | Performed by: INTERNAL MEDICINE

## 2021-04-30 PROCEDURE — 84466 ASSAY OF TRANSFERRIN: CPT | Performed by: INTERNAL MEDICINE

## 2021-04-30 PROCEDURE — 93975 VASCULAR STUDY: CPT

## 2021-04-30 PROCEDURE — 82962 GLUCOSE BLOOD TEST: CPT

## 2021-04-30 PROCEDURE — 93454 CORONARY ARTERY ANGIO S&I: CPT | Performed by: INTERNAL MEDICINE

## 2021-04-30 PROCEDURE — 80074 ACUTE HEPATITIS PANEL: CPT | Performed by: INTERNAL MEDICINE

## 2021-04-30 PROCEDURE — 80053 COMPREHEN METABOLIC PANEL: CPT | Performed by: INTERNAL MEDICINE

## 2021-04-30 PROCEDURE — 86706 HEP B SURFACE ANTIBODY: CPT | Performed by: INTERNAL MEDICINE

## 2021-04-30 PROCEDURE — 0 GADOBENATE DIMEGLUMINE 529 MG/ML SOLUTION: Performed by: INTERNAL MEDICINE

## 2021-04-30 PROCEDURE — 25010000002 MIDAZOLAM PER 1 MG: Performed by: INTERNAL MEDICINE

## 2021-04-30 PROCEDURE — 85730 THROMBOPLASTIN TIME PARTIAL: CPT | Performed by: INTERNAL MEDICINE

## 2021-04-30 PROCEDURE — 83540 ASSAY OF IRON: CPT | Performed by: INTERNAL MEDICINE

## 2021-04-30 PROCEDURE — 81332 SERPINA1 GENE: CPT | Performed by: INTERNAL MEDICINE

## 2021-04-30 PROCEDURE — 86708 HEPATITIS A ANTIBODY: CPT | Performed by: INTERNAL MEDICINE

## 2021-04-30 PROCEDURE — 82728 ASSAY OF FERRITIN: CPT | Performed by: INTERNAL MEDICINE

## 2021-04-30 PROCEDURE — 86038 ANTINUCLEAR ANTIBODIES: CPT | Performed by: INTERNAL MEDICINE

## 2021-04-30 PROCEDURE — 0 IOPAMIDOL PER 1 ML: Performed by: INTERNAL MEDICINE

## 2021-04-30 PROCEDURE — 25010000002 HEPARIN (PORCINE) PER 1000 UNITS: Performed by: INTERNAL MEDICINE

## 2021-04-30 PROCEDURE — B2111ZZ FLUOROSCOPY OF MULTIPLE CORONARY ARTERIES USING LOW OSMOLAR CONTRAST: ICD-10-PCS | Performed by: INTERNAL MEDICINE

## 2021-04-30 PROCEDURE — 82140 ASSAY OF AMMONIA: CPT | Performed by: INTERNAL MEDICINE

## 2021-04-30 PROCEDURE — 75561 CARDIAC MRI FOR MORPH W/DYE: CPT

## 2021-04-30 PROCEDURE — 25010000002 FENTANYL CITRATE (PF) 100 MCG/2ML SOLUTION: Performed by: INTERNAL MEDICINE

## 2021-04-30 PROCEDURE — 85025 COMPLETE CBC W/AUTO DIFF WBC: CPT | Performed by: INTERNAL MEDICINE

## 2021-04-30 PROCEDURE — 84165 PROTEIN E-PHORESIS SERUM: CPT | Performed by: INTERNAL MEDICINE

## 2021-04-30 RX ORDER — SODIUM CHLORIDE 9 MG/ML
50 INJECTION, SOLUTION INTRAVENOUS CONTINUOUS
Status: ACTIVE | OUTPATIENT
Start: 2021-04-30 | End: 2021-04-30

## 2021-04-30 RX ORDER — MIDAZOLAM HYDROCHLORIDE 1 MG/ML
INJECTION INTRAMUSCULAR; INTRAVENOUS AS NEEDED
Status: DISCONTINUED | OUTPATIENT
Start: 2021-04-30 | End: 2021-04-30 | Stop reason: HOSPADM

## 2021-04-30 RX ORDER — LIDOCAINE HYDROCHLORIDE 20 MG/ML
INJECTION, SOLUTION INFILTRATION; PERINEURAL AS NEEDED
Status: DISCONTINUED | OUTPATIENT
Start: 2021-04-30 | End: 2021-04-30 | Stop reason: HOSPADM

## 2021-04-30 RX ORDER — FENTANYL CITRATE 50 UG/ML
INJECTION, SOLUTION INTRAMUSCULAR; INTRAVENOUS AS NEEDED
Status: DISCONTINUED | OUTPATIENT
Start: 2021-04-30 | End: 2021-04-30 | Stop reason: HOSPADM

## 2021-04-30 RX ADMIN — ATORVASTATIN CALCIUM 40 MG: 20 TABLET, FILM COATED ORAL at 09:48

## 2021-04-30 RX ADMIN — BUDESONIDE AND FORMOTEROL FUMARATE DIHYDRATE 2 PUFF: 160; 4.5 AEROSOL RESPIRATORY (INHALATION) at 10:39

## 2021-04-30 RX ADMIN — TRIAMTERENE AND HYDROCHLOROTHIAZIDE 1 TABLET: 37.5; 25 TABLET ORAL at 09:49

## 2021-04-30 RX ADMIN — SODIUM CHLORIDE 50 ML/HR: 9 INJECTION, SOLUTION INTRAVENOUS at 14:44

## 2021-04-30 RX ADMIN — GADOBENATE DIMEGLUMINE 20 ML: 529 INJECTION, SOLUTION INTRAVENOUS at 16:30

## 2021-04-30 RX ADMIN — SODIUM CHLORIDE 75 ML/HR: 9 INJECTION, SOLUTION INTRAVENOUS at 10:36

## 2021-04-30 RX ADMIN — SODIUM CHLORIDE, PRESERVATIVE FREE 10 ML: 5 INJECTION INTRAVENOUS at 20:40

## 2021-04-30 RX ADMIN — AMLODIPINE BESYLATE 2.5 MG: 2.5 TABLET ORAL at 09:49

## 2021-04-30 RX ADMIN — BUDESONIDE AND FORMOTEROL FUMARATE DIHYDRATE 2 PUFF: 160; 4.5 AEROSOL RESPIRATORY (INHALATION) at 20:51

## 2021-04-30 RX ADMIN — SODIUM CHLORIDE, PRESERVATIVE FREE 10 ML: 5 INJECTION INTRAVENOUS at 09:49

## 2021-05-01 PROBLEM — I35.0 NONRHEUMATIC AORTIC VALVE STENOSIS: Status: ACTIVE | Noted: 2021-04-28

## 2021-05-01 LAB
ABO GROUP BLD: NORMAL
ACTIN IGG SERPL-ACNC: 7 UNITS (ref 0–19)
ANA SER QL: NEGATIVE
ANION GAP SERPL CALCULATED.3IONS-SCNC: 10.9 MMOL/L (ref 5–15)
APTT PPP: 29.9 SECONDS (ref 22.7–35.4)
B2 GLYCOPROT1 IGA SER-ACNC: <9 GPI IGA UNITS (ref 0–25)
B2 GLYCOPROT1 IGG SER-ACNC: <9 GPI IGG UNITS (ref 0–20)
B2 GLYCOPROT1 IGM SER-ACNC: <9 GPI IGM UNITS (ref 0–32)
BASOPHILS # BLD AUTO: 0.02 10*3/MM3 (ref 0–0.2)
BASOPHILS NFR BLD AUTO: 0.3 % (ref 0–1.5)
BLD GP AB SCN SERPL QL: NEGATIVE
BUN SERPL-MCNC: 13 MG/DL (ref 6–20)
BUN/CREAT SERPL: 15.9 (ref 7–25)
CALCIUM SPEC-SCNC: 9.1 MG/DL (ref 8.6–10.5)
CHLORIDE SERPL-SCNC: 98 MMOL/L (ref 98–107)
CO2 SERPL-SCNC: 25.1 MMOL/L (ref 22–29)
CREAT SERPL-MCNC: 0.82 MG/DL (ref 0.57–1)
DEPRECATED RDW RBC AUTO: 40.4 FL (ref 37–54)
EOSINOPHIL # BLD AUTO: 0.2 10*3/MM3 (ref 0–0.4)
EOSINOPHIL NFR BLD AUTO: 3.1 % (ref 0.3–6.2)
ERYTHROCYTE [DISTWIDTH] IN BLOOD BY AUTOMATED COUNT: 12.7 % (ref 12.3–15.4)
GFR SERPL CREATININE-BSD FRML MDRD: 74 ML/MIN/1.73
GLUCOSE BLDC GLUCOMTR-MCNC: 174 MG/DL (ref 70–130)
GLUCOSE BLDC GLUCOMTR-MCNC: 175 MG/DL (ref 70–130)
GLUCOSE BLDC GLUCOMTR-MCNC: 178 MG/DL (ref 70–130)
GLUCOSE BLDC GLUCOMTR-MCNC: 270 MG/DL (ref 70–130)
GLUCOSE SERPL-MCNC: 178 MG/DL (ref 65–99)
HAV AB SER QL IA: NEGATIVE
HCT VFR BLD AUTO: 45 % (ref 34–46.6)
HGB BLD-MCNC: 14.9 G/DL (ref 12–15.9)
IMM GRANULOCYTES # BLD AUTO: 0.03 10*3/MM3 (ref 0–0.05)
IMM GRANULOCYTES NFR BLD AUTO: 0.5 % (ref 0–0.5)
LYMPHOCYTES # BLD AUTO: 1.37 10*3/MM3 (ref 0.7–3.1)
LYMPHOCYTES NFR BLD AUTO: 21 % (ref 19.6–45.3)
MCH RBC QN AUTO: 29.1 PG (ref 26.6–33)
MCHC RBC AUTO-ENTMCNC: 33.1 G/DL (ref 31.5–35.7)
MCV RBC AUTO: 87.9 FL (ref 79–97)
MONOCYTES # BLD AUTO: 0.4 10*3/MM3 (ref 0.1–0.9)
MONOCYTES NFR BLD AUTO: 6.1 % (ref 5–12)
NEUTROPHILS NFR BLD AUTO: 4.49 10*3/MM3 (ref 1.7–7)
NEUTROPHILS NFR BLD AUTO: 69 % (ref 42.7–76)
NRBC BLD AUTO-RTO: 0 /100 WBC (ref 0–0.2)
PLATELET # BLD AUTO: 163 10*3/MM3 (ref 140–450)
PMV BLD AUTO: 9.7 FL (ref 6–12)
POTASSIUM SERPL-SCNC: 4.3 MMOL/L (ref 3.5–5.2)
PROT C AG ACT/NOR PPP IA: 59 % (ref 60–150)
PROT S AG ACT/NOR PPP IA: 79 % (ref 60–150)
RBC # BLD AUTO: 5.12 10*6/MM3 (ref 3.77–5.28)
RH BLD: POSITIVE
SODIUM SERPL-SCNC: 134 MMOL/L (ref 136–145)
T&S EXPIRATION DATE: NORMAL
WBC # BLD AUTO: 6.51 10*3/MM3 (ref 3.4–10.8)

## 2021-05-01 PROCEDURE — 85730 THROMBOPLASTIN TIME PARTIAL: CPT | Performed by: INTERNAL MEDICINE

## 2021-05-01 PROCEDURE — 99232 SBSQ HOSP IP/OBS MODERATE 35: CPT | Performed by: INTERNAL MEDICINE

## 2021-05-01 PROCEDURE — 86901 BLOOD TYPING SEROLOGIC RH(D): CPT | Performed by: THORACIC SURGERY (CARDIOTHORACIC VASCULAR SURGERY)

## 2021-05-01 PROCEDURE — 86900 BLOOD TYPING SEROLOGIC ABO: CPT

## 2021-05-01 PROCEDURE — 80048 BASIC METABOLIC PNL TOTAL CA: CPT | Performed by: INTERNAL MEDICINE

## 2021-05-01 PROCEDURE — 94799 UNLISTED PULMONARY SVC/PX: CPT

## 2021-05-01 PROCEDURE — 85025 COMPLETE CBC W/AUTO DIFF WBC: CPT | Performed by: INTERNAL MEDICINE

## 2021-05-01 PROCEDURE — U0004 COV-19 TEST NON-CDC HGH THRU: HCPCS | Performed by: THORACIC SURGERY (CARDIOTHORACIC VASCULAR SURGERY)

## 2021-05-01 PROCEDURE — 82962 GLUCOSE BLOOD TEST: CPT

## 2021-05-01 PROCEDURE — 86850 RBC ANTIBODY SCREEN: CPT | Performed by: THORACIC SURGERY (CARDIOTHORACIC VASCULAR SURGERY)

## 2021-05-01 PROCEDURE — 86900 BLOOD TYPING SEROLOGIC ABO: CPT | Performed by: THORACIC SURGERY (CARDIOTHORACIC VASCULAR SURGERY)

## 2021-05-01 PROCEDURE — 86901 BLOOD TYPING SEROLOGIC RH(D): CPT

## 2021-05-01 PROCEDURE — 63710000001 INSULIN REGULAR HUMAN PER 5 UNITS: Performed by: INTERNAL MEDICINE

## 2021-05-01 PROCEDURE — 86923 COMPATIBILITY TEST ELECTRIC: CPT

## 2021-05-01 RX ORDER — DIPHENHYDRAMINE HCL 25 MG
25 CAPSULE ORAL NIGHTLY PRN
Status: DISCONTINUED | OUTPATIENT
Start: 2021-05-01 | End: 2021-05-03

## 2021-05-01 RX ORDER — CHLORHEXIDINE GLUCONATE 500 MG/1
1 CLOTH TOPICAL EVERY 12 HOURS
Status: DISCONTINUED | OUTPATIENT
Start: 2021-05-02 | End: 2021-05-01 | Stop reason: SDUPTHER

## 2021-05-01 RX ORDER — CHLORHEXIDINE GLUCONATE 0.12 MG/ML
15 RINSE ORAL ONCE
Status: DISCONTINUED | OUTPATIENT
Start: 2021-05-02 | End: 2021-05-01

## 2021-05-01 RX ORDER — CHLORHEXIDINE GLUCONATE 0.12 MG/ML
15 RINSE ORAL EVERY 12 HOURS SCHEDULED
Status: DISCONTINUED | OUTPATIENT
Start: 2021-05-02 | End: 2021-05-01

## 2021-05-01 RX ORDER — CHLORHEXIDINE GLUCONATE 500 MG/1
1 CLOTH TOPICAL EVERY 12 HOURS PRN
Status: DISCONTINUED | OUTPATIENT
Start: 2021-05-02 | End: 2021-05-03

## 2021-05-01 RX ORDER — CEFAZOLIN SODIUM IN 0.9 % NACL 3 G/100 ML
3 INTRAVENOUS SOLUTION, PIGGYBACK (ML) INTRAVENOUS
Status: DISCONTINUED | OUTPATIENT
Start: 2021-05-01 | End: 2021-05-01

## 2021-05-01 RX ORDER — TEMAZEPAM 15 MG/1
15 CAPSULE ORAL NIGHTLY PRN
Status: DISCONTINUED | OUTPATIENT
Start: 2021-05-01 | End: 2021-05-03

## 2021-05-01 RX ORDER — CHLORHEXIDINE GLUCONATE 0.12 MG/ML
15 RINSE ORAL EVERY 12 HOURS SCHEDULED
Status: COMPLETED | OUTPATIENT
Start: 2021-05-02 | End: 2021-05-03

## 2021-05-01 RX ORDER — CHLORHEXIDINE GLUCONATE 500 MG/1
1 CLOTH TOPICAL EVERY 12 HOURS
Status: DISCONTINUED | OUTPATIENT
Start: 2021-05-02 | End: 2021-05-01

## 2021-05-01 RX ORDER — CEFAZOLIN SODIUM IN 0.9 % NACL 3 G/100 ML
3 INTRAVENOUS SOLUTION, PIGGYBACK (ML) INTRAVENOUS
Status: COMPLETED | OUTPATIENT
Start: 2021-05-03 | End: 2021-05-03

## 2021-05-01 RX ORDER — CHLORHEXIDINE GLUCONATE 500 MG/1
1 CLOTH TOPICAL EVERY 12 HOURS PRN
Status: DISCONTINUED | OUTPATIENT
Start: 2021-05-01 | End: 2021-05-01

## 2021-05-01 RX ADMIN — BUDESONIDE AND FORMOTEROL FUMARATE DIHYDRATE 2 PUFF: 160; 4.5 AEROSOL RESPIRATORY (INHALATION) at 20:17

## 2021-05-01 RX ADMIN — BUDESONIDE AND FORMOTEROL FUMARATE DIHYDRATE 2 PUFF: 160; 4.5 AEROSOL RESPIRATORY (INHALATION) at 07:58

## 2021-05-01 RX ADMIN — SODIUM CHLORIDE, PRESERVATIVE FREE 10 ML: 5 INJECTION INTRAVENOUS at 21:05

## 2021-05-01 RX ADMIN — LINAGLIPTIN 5 MG: 5 TABLET, FILM COATED ORAL at 08:47

## 2021-05-01 RX ADMIN — TRIAMTERENE AND HYDROCHLOROTHIAZIDE 1 TABLET: 37.5; 25 TABLET ORAL at 08:47

## 2021-05-01 RX ADMIN — INSULIN HUMAN 6 UNITS: 100 INJECTION, SOLUTION PARENTERAL at 21:05

## 2021-05-01 RX ADMIN — ATORVASTATIN CALCIUM 40 MG: 20 TABLET, FILM COATED ORAL at 21:05

## 2021-05-01 RX ADMIN — SODIUM CHLORIDE, PRESERVATIVE FREE 10 ML: 5 INJECTION INTRAVENOUS at 08:48

## 2021-05-01 RX ADMIN — AMLODIPINE BESYLATE 2.5 MG: 2.5 TABLET ORAL at 08:47

## 2021-05-02 LAB
ANION GAP SERPL CALCULATED.3IONS-SCNC: 7.7 MMOL/L (ref 5–15)
BASOPHILS # BLD AUTO: 0.02 10*3/MM3 (ref 0–0.2)
BASOPHILS NFR BLD AUTO: 0.3 % (ref 0–1.5)
BUN SERPL-MCNC: 16 MG/DL (ref 6–20)
BUN/CREAT SERPL: 20.5 (ref 7–25)
CALCIUM SPEC-SCNC: 8.9 MG/DL (ref 8.6–10.5)
CHLORIDE SERPL-SCNC: 98 MMOL/L (ref 98–107)
CO2 SERPL-SCNC: 25.3 MMOL/L (ref 22–29)
CREAT SERPL-MCNC: 0.78 MG/DL (ref 0.57–1)
DEPRECATED RDW RBC AUTO: 40 FL (ref 37–54)
EOSINOPHIL # BLD AUTO: 0.26 10*3/MM3 (ref 0–0.4)
EOSINOPHIL NFR BLD AUTO: 3.6 % (ref 0.3–6.2)
ERYTHROCYTE [DISTWIDTH] IN BLOOD BY AUTOMATED COUNT: 12.6 % (ref 12.3–15.4)
FIBRINOGEN PPP-MCNC: 386 MG/DL (ref 219–464)
GFR SERPL CREATININE-BSD FRML MDRD: 79 ML/MIN/1.73
GLUCOSE BLDC GLUCOMTR-MCNC: 182 MG/DL (ref 70–130)
GLUCOSE BLDC GLUCOMTR-MCNC: 187 MG/DL (ref 70–130)
GLUCOSE BLDC GLUCOMTR-MCNC: 194 MG/DL (ref 70–130)
GLUCOSE BLDC GLUCOMTR-MCNC: 226 MG/DL (ref 70–130)
GLUCOSE SERPL-MCNC: 182 MG/DL (ref 65–99)
HCT VFR BLD AUTO: 43.3 % (ref 34–46.6)
HGB BLD-MCNC: 14.2 G/DL (ref 12–15.9)
LYMPHOCYTES # BLD AUTO: 1.68 10*3/MM3 (ref 0.7–3.1)
LYMPHOCYTES NFR BLD AUTO: 23.4 % (ref 19.6–45.3)
MCH RBC QN AUTO: 28.5 PG (ref 26.6–33)
MCHC RBC AUTO-ENTMCNC: 32.8 G/DL (ref 31.5–35.7)
MCV RBC AUTO: 86.9 FL (ref 79–97)
MONOCYTES # BLD AUTO: 0.56 10*3/MM3 (ref 0.1–0.9)
MONOCYTES NFR BLD AUTO: 7.8 % (ref 5–12)
NEUTROPHILS NFR BLD AUTO: 4.65 10*3/MM3 (ref 1.7–7)
NEUTROPHILS NFR BLD AUTO: 64.6 % (ref 42.7–76)
PLATELET # BLD AUTO: 153 10*3/MM3 (ref 140–450)
PMV BLD AUTO: 9.7 FL (ref 6–12)
POTASSIUM SERPL-SCNC: 4.1 MMOL/L (ref 3.5–5.2)
RBC # BLD AUTO: 4.98 10*6/MM3 (ref 3.77–5.28)
SARS-COV-2 ORF1AB RESP QL NAA+PROBE: NOT DETECTED
SODIUM SERPL-SCNC: 131 MMOL/L (ref 136–145)
WBC # BLD AUTO: 7.19 10*3/MM3 (ref 3.4–10.8)

## 2021-05-02 PROCEDURE — 85384 FIBRINOGEN ACTIVITY: CPT | Performed by: INTERNAL MEDICINE

## 2021-05-02 PROCEDURE — 99231 SBSQ HOSP IP/OBS SF/LOW 25: CPT | Performed by: INTERNAL MEDICINE

## 2021-05-02 PROCEDURE — 94799 UNLISTED PULMONARY SVC/PX: CPT

## 2021-05-02 PROCEDURE — 82962 GLUCOSE BLOOD TEST: CPT

## 2021-05-02 PROCEDURE — 99232 SBSQ HOSP IP/OBS MODERATE 35: CPT | Performed by: INTERNAL MEDICINE

## 2021-05-02 PROCEDURE — 63710000001 INSULIN REGULAR HUMAN PER 5 UNITS: Performed by: INTERNAL MEDICINE

## 2021-05-02 PROCEDURE — 80048 BASIC METABOLIC PNL TOTAL CA: CPT | Performed by: THORACIC SURGERY (CARDIOTHORACIC VASCULAR SURGERY)

## 2021-05-02 PROCEDURE — 85025 COMPLETE CBC W/AUTO DIFF WBC: CPT | Performed by: INTERNAL MEDICINE

## 2021-05-02 RX ADMIN — CHLORHEXIDINE GLUCONATE 15 ML: 1.2 RINSE ORAL at 20:34

## 2021-05-02 RX ADMIN — TRIAMTERENE AND HYDROCHLOROTHIAZIDE 1 TABLET: 37.5; 25 TABLET ORAL at 08:13

## 2021-05-02 RX ADMIN — INSULIN HUMAN 2 UNITS: 100 INJECTION, SOLUTION PARENTERAL at 17:12

## 2021-05-02 RX ADMIN — MUPIROCIN 1 APPLICATION: 20 OINTMENT TOPICAL at 20:34

## 2021-05-02 RX ADMIN — SODIUM CHLORIDE, PRESERVATIVE FREE 10 ML: 5 INJECTION INTRAVENOUS at 20:35

## 2021-05-02 RX ADMIN — ATORVASTATIN CALCIUM 40 MG: 20 TABLET, FILM COATED ORAL at 08:14

## 2021-05-02 RX ADMIN — BUDESONIDE AND FORMOTEROL FUMARATE DIHYDRATE 2 PUFF: 160; 4.5 AEROSOL RESPIRATORY (INHALATION) at 08:57

## 2021-05-02 RX ADMIN — BUDESONIDE AND FORMOTEROL FUMARATE DIHYDRATE 2 PUFF: 160; 4.5 AEROSOL RESPIRATORY (INHALATION) at 19:32

## 2021-05-02 RX ADMIN — INSULIN HUMAN 2 UNITS: 100 INJECTION, SOLUTION PARENTERAL at 11:32

## 2021-05-02 RX ADMIN — LINAGLIPTIN 5 MG: 5 TABLET, FILM COATED ORAL at 08:13

## 2021-05-02 RX ADMIN — SODIUM CHLORIDE, PRESERVATIVE FREE 10 ML: 5 INJECTION INTRAVENOUS at 08:13

## 2021-05-02 RX ADMIN — INSULIN HUMAN 4 UNITS: 100 INJECTION, SOLUTION PARENTERAL at 20:34

## 2021-05-02 RX ADMIN — AMLODIPINE BESYLATE 2.5 MG: 2.5 TABLET ORAL at 08:14

## 2021-05-03 ENCOUNTER — ANCILLARY PROCEDURE (OUTPATIENT)
Dept: PERIOP | Facility: HOSPITAL | Age: 46
End: 2021-05-03

## 2021-05-03 ENCOUNTER — APPOINTMENT (OUTPATIENT)
Dept: RESPIRATORY THERAPY | Facility: HOSPITAL | Age: 46
End: 2021-05-03

## 2021-05-03 ENCOUNTER — APPOINTMENT (OUTPATIENT)
Dept: GENERAL RADIOLOGY | Facility: HOSPITAL | Age: 46
End: 2021-05-03

## 2021-05-03 ENCOUNTER — ANESTHESIA (OUTPATIENT)
Dept: PERIOP | Facility: HOSPITAL | Age: 46
End: 2021-05-03

## 2021-05-03 ENCOUNTER — ANESTHESIA EVENT (OUTPATIENT)
Dept: PERIOP | Facility: HOSPITAL | Age: 46
End: 2021-05-03

## 2021-05-03 LAB
ACT BLD: 109 SECONDS (ref 82–152)
ACT BLD: 120 SECONDS (ref 82–152)
ACT BLD: 390 SECONDS (ref 82–152)
ACT BLD: 406 SECONDS (ref 82–152)
ACT BLD: 433 SECONDS (ref 82–152)
ACT BLD: 466 SECONDS (ref 82–152)
ACT BLD: 477 SECONDS (ref 82–152)
ALBUMIN SERPL ELPH-MCNC: 3 G/DL (ref 2.9–4.4)
ALBUMIN SERPL-MCNC: 3.5 G/DL (ref 3.5–5.2)
ALBUMIN SERPL-MCNC: 3.9 G/DL (ref 3.5–5.2)
ALBUMIN SERPL-MCNC: 4.2 G/DL (ref 3.5–5.2)
ALBUMIN/GLOB SERPL: 1 {RATIO} (ref 0.7–1.7)
ALP SERPL-CCNC: 96 U/L (ref 39–117)
ALPHA1 GLOB SERPL ELPH-MCNC: 0.3 G/DL (ref 0–0.4)
ALPHA2 GLOB SERPL ELPH-MCNC: 0.8 G/DL (ref 0.4–1)
ALT SERPL W P-5'-P-CCNC: 17 U/L (ref 1–33)
ANION GAP SERPL CALCULATED.3IONS-SCNC: 10.4 MMOL/L (ref 5–15)
ANION GAP SERPL CALCULATED.3IONS-SCNC: 10.4 MMOL/L (ref 5–15)
ANION GAP SERPL CALCULATED.3IONS-SCNC: 12 MMOL/L (ref 5–15)
APTT PPP: 35.3 SECONDS (ref 22.7–35.4)
APTT SCREEN TO CONFIRM RATIO: 0.96 RATIO (ref 0–1.4)
ARTERIAL PATENCY WRIST A: ABNORMAL
AST SERPL-CCNC: 21 U/L (ref 1–32)
ATMOSPHERIC PRESS: 742.4 MMHG
ATMOSPHERIC PRESS: 742.4 MMHG
ATMOSPHERIC PRESS: 743.3 MMHG
B-GLOBULIN SERPL ELPH-MCNC: 0.7 G/DL (ref 0.7–1.3)
B-HCG UR QL: NEGATIVE
BACTERIA SPEC AEROBE CULT: NORMAL
BACTERIA SPEC AEROBE CULT: NORMAL
BASE EXCESS BLDA CALC-SCNC: -1.2 MMOL/L (ref 0–2)
BASE EXCESS BLDA CALC-SCNC: -1.6 MMOL/L (ref 0–2)
BASE EXCESS BLDA CALC-SCNC: -2 MMOL/L (ref 0–2)
BASE EXCESS BLDA CALC-SCNC: 1 MMOL/L (ref -5–5)
BASE EXCESS BLDA CALC-SCNC: 3 MMOL/L (ref -5–5)
BASE EXCESS BLDA CALC-SCNC: 3 MMOL/L (ref -5–5)
BASE EXCESS BLDA CALC-SCNC: 4 MMOL/L (ref -5–5)
BASE EXCESS BLDA CALC-SCNC: 4 MMOL/L (ref -5–5)
BASE EXCESS BLDA CALC-SCNC: 5 MMOL/L (ref -5–5)
BASE EXCESS BLDA CALC-SCNC: 5 MMOL/L (ref -5–5)
BASOPHILS # BLD AUTO: 0.02 10*3/MM3 (ref 0–0.2)
BASOPHILS # BLD AUTO: 0.04 10*3/MM3 (ref 0–0.2)
BASOPHILS NFR BLD AUTO: 0.1 % (ref 0–1.5)
BASOPHILS NFR BLD AUTO: 0.5 % (ref 0–1.5)
BDY SITE: ABNORMAL
BILIRUB CONJ SERPL-MCNC: 0.2 MG/DL (ref 0–0.3)
BILIRUB INDIRECT SERPL-MCNC: 0.4 MG/DL
BILIRUB SERPL-MCNC: 0.6 MG/DL (ref 0–1.2)
BUN SERPL-MCNC: 18 MG/DL (ref 6–20)
BUN SERPL-MCNC: 19 MG/DL (ref 6–20)
BUN SERPL-MCNC: 21 MG/DL (ref 6–20)
BUN/CREAT SERPL: 16.1 (ref 7–25)
BUN/CREAT SERPL: 17.1 (ref 7–25)
BUN/CREAT SERPL: 25.3 (ref 7–25)
CA-I BLD-MCNC: 4.8 MG/DL (ref 4.6–5.4)
CA-I BLDA-SCNC: ABNORMAL MMOL/L
CA-I SERPL ISE-MCNC: 1.19 MMOL/L (ref 1.15–1.35)
CALCIUM SPEC-SCNC: 8.4 MG/DL (ref 8.6–10.5)
CALCIUM SPEC-SCNC: 8.5 MG/DL (ref 8.6–10.5)
CALCIUM SPEC-SCNC: 8.8 MG/DL (ref 8.6–10.5)
CHLORIDE SERPL-SCNC: 101 MMOL/L (ref 98–107)
CHLORIDE SERPL-SCNC: 103 MMOL/L (ref 98–107)
CHLORIDE SERPL-SCNC: 99 MMOL/L (ref 98–107)
CO2 BLDA-SCNC: 27 MMOL/L (ref 24–29)
CO2 BLDA-SCNC: 29 MMOL/L (ref 24–29)
CO2 BLDA-SCNC: 30 MMOL/L (ref 24–29)
CO2 BLDA-SCNC: 31 MMOL/L (ref 24–29)
CO2 BLDA-SCNC: 32 MMOL/L (ref 24–29)
CO2 BLDA-SCNC: 32 MMOL/L (ref 24–29)
CO2 BLDA-SCNC: 33 MMOL/L (ref 24–29)
CO2 SERPL-SCNC: 22.6 MMOL/L (ref 22–29)
CO2 SERPL-SCNC: 22.6 MMOL/L (ref 22–29)
CO2 SERPL-SCNC: 23 MMOL/L (ref 22–29)
CONFIRM APTT/NORMAL: 39.8 SEC (ref 0–55)
CREAT SERPL-MCNC: 0.83 MG/DL (ref 0.57–1)
CREAT SERPL-MCNC: 1.11 MG/DL (ref 0.57–1)
CREAT SERPL-MCNC: 1.12 MG/DL (ref 0.57–1)
DEPRECATED RDW RBC AUTO: 39.8 FL (ref 37–54)
DEPRECATED RDW RBC AUTO: 40.2 FL (ref 37–54)
DEPRECATED RDW RBC AUTO: 40.2 FL (ref 37–54)
EOSINOPHIL # BLD AUTO: 0.06 10*3/MM3 (ref 0–0.4)
EOSINOPHIL # BLD AUTO: 0.24 10*3/MM3 (ref 0–0.4)
EOSINOPHIL NFR BLD AUTO: 0.4 % (ref 0.3–6.2)
EOSINOPHIL NFR BLD AUTO: 2.9 % (ref 0.3–6.2)
ERYTHROCYTE [DISTWIDTH] IN BLOOD BY AUTOMATED COUNT: 12.7 % (ref 12.3–15.4)
ERYTHROCYTE [DISTWIDTH] IN BLOOD BY AUTOMATED COUNT: 12.7 % (ref 12.3–15.4)
ERYTHROCYTE [DISTWIDTH] IN BLOOD BY AUTOMATED COUNT: 12.8 % (ref 12.3–15.4)
FIBRINOGEN PPP-MCNC: 329 MG/DL (ref 219–464)
GAMMA GLOB SERPL ELPH-MCNC: 1.1 G/DL (ref 0.4–1.8)
GFR SERPL CREATININE-BSD FRML MDRD: 52 ML/MIN/1.73
GFR SERPL CREATININE-BSD FRML MDRD: 52 ML/MIN/1.73
GFR SERPL CREATININE-BSD FRML MDRD: 73 ML/MIN/1.73
GLOBULIN SER CALC-MCNC: 2.9 G/DL (ref 2.2–3.9)
GLUCOSE BLDC GLUCOMTR-MCNC: 113 MG/DL (ref 70–130)
GLUCOSE BLDC GLUCOMTR-MCNC: 122 MG/DL (ref 70–130)
GLUCOSE BLDC GLUCOMTR-MCNC: 150 MG/DL (ref 70–130)
GLUCOSE BLDC GLUCOMTR-MCNC: 150 MG/DL (ref 70–130)
GLUCOSE BLDC GLUCOMTR-MCNC: 151 MG/DL (ref 70–130)
GLUCOSE BLDC GLUCOMTR-MCNC: 155 MG/DL (ref 70–130)
GLUCOSE BLDC GLUCOMTR-MCNC: 155 MG/DL (ref 70–130)
GLUCOSE BLDC GLUCOMTR-MCNC: 162 MG/DL (ref 70–130)
GLUCOSE BLDC GLUCOMTR-MCNC: 162 MG/DL (ref 70–130)
GLUCOSE BLDC GLUCOMTR-MCNC: 164 MG/DL (ref 70–130)
GLUCOSE BLDC GLUCOMTR-MCNC: 167 MG/DL (ref 70–130)
GLUCOSE BLDC GLUCOMTR-MCNC: 168 MG/DL (ref 70–130)
GLUCOSE BLDC GLUCOMTR-MCNC: 168 MG/DL (ref 70–130)
GLUCOSE BLDC GLUCOMTR-MCNC: 173 MG/DL (ref 70–130)
GLUCOSE BLDC GLUCOMTR-MCNC: 173 MG/DL (ref 70–130)
GLUCOSE BLDC GLUCOMTR-MCNC: 95 MG/DL (ref 70–130)
GLUCOSE SERPL-MCNC: 125 MG/DL (ref 65–99)
GLUCOSE SERPL-MCNC: 151 MG/DL (ref 65–99)
GLUCOSE SERPL-MCNC: 160 MG/DL (ref 65–99)
HCO3 BLDA-SCNC: 24 MMOL/L (ref 22–28)
HCO3 BLDA-SCNC: 24.4 MMOL/L (ref 22–28)
HCO3 BLDA-SCNC: 24.5 MMOL/L (ref 22–28)
HCO3 BLDA-SCNC: 26.1 MMOL/L (ref 22–26)
HCO3 BLDA-SCNC: 28 MMOL/L (ref 22–26)
HCO3 BLDA-SCNC: 28.7 MMOL/L (ref 22–26)
HCO3 BLDA-SCNC: 29.1 MMOL/L (ref 22–26)
HCO3 BLDA-SCNC: 30.2 MMOL/L (ref 22–26)
HCO3 BLDA-SCNC: 30.7 MMOL/L (ref 22–26)
HCO3 BLDA-SCNC: 31 MMOL/L (ref 22–26)
HCT VFR BLD AUTO: 38.9 % (ref 34–46.6)
HCT VFR BLD AUTO: 39 % (ref 34–46.6)
HCT VFR BLD AUTO: 44.3 % (ref 34–46.6)
HCT VFR BLDA CALC: 33 % (ref 38–51)
HCT VFR BLDA CALC: 34 % (ref 38–51)
HCT VFR BLDA CALC: 35 % (ref 38–51)
HCT VFR BLDA CALC: 36 % (ref 38–51)
HCT VFR BLDA CALC: 37 % (ref 38–51)
HCT VFR BLDA CALC: 38 % (ref 38–51)
HCT VFR BLDA CALC: 44 % (ref 38–51)
HGB BLD-MCNC: 13.2 G/DL (ref 12–15.9)
HGB BLD-MCNC: 13.3 G/DL (ref 12–15.9)
HGB BLD-MCNC: 15 G/DL (ref 12–15.9)
HGB BLDA-MCNC: 11.2 G/DL (ref 12–17)
HGB BLDA-MCNC: 11.6 G/DL (ref 12–17)
HGB BLDA-MCNC: 11.9 G/DL (ref 12–17)
HGB BLDA-MCNC: 12.2 G/DL (ref 12–17)
HGB BLDA-MCNC: 12.6 G/DL (ref 12–17)
HGB BLDA-MCNC: 12.9 G/DL (ref 12–17)
HGB BLDA-MCNC: 15 G/DL (ref 12–17)
IMM GRANULOCYTES # BLD AUTO: 0.04 10*3/MM3 (ref 0–0.05)
IMM GRANULOCYTES # BLD AUTO: 0.14 10*3/MM3 (ref 0–0.05)
IMM GRANULOCYTES NFR BLD AUTO: 0.5 % (ref 0–0.5)
IMM GRANULOCYTES NFR BLD AUTO: 1 % (ref 0–0.5)
INHALED O2 CONCENTRATION: 100 %
INHALED O2 CONCENTRATION: 40 %
INHALED O2 CONCENTRATION: 40 %
INR PPP: 1.49 (ref 0.9–1.1)
INTERNAL NEGATIVE CONTROL: NEGATIVE
INTERNAL POSITIVE CONTROL: POSITIVE
LA 2 SCREEN W REFLEX-IMP: ABNORMAL
LABORATORY COMMENT REPORT: ABNORMAL
LYMPHOCYTES # BLD AUTO: 0.69 10*3/MM3 (ref 0.7–3.1)
LYMPHOCYTES # BLD AUTO: 1.86 10*3/MM3 (ref 0.7–3.1)
LYMPHOCYTES NFR BLD AUTO: 22.8 % (ref 19.6–45.3)
LYMPHOCYTES NFR BLD AUTO: 4.8 % (ref 19.6–45.3)
Lab: NORMAL
M PROTEIN SERPL ELPH-MCNC: ABNORMAL G/DL
MAGNESIUM SERPL-MCNC: 2.4 MG/DL (ref 1.6–2.6)
MAGNESIUM SERPL-MCNC: 2.6 MG/DL (ref 1.6–2.6)
MCH RBC QN AUTO: 29.2 PG (ref 26.6–33)
MCH RBC QN AUTO: 29.5 PG (ref 26.6–33)
MCH RBC QN AUTO: 29.7 PG (ref 26.6–33)
MCHC RBC AUTO-ENTMCNC: 33.9 G/DL (ref 31.5–35.7)
MCHC RBC AUTO-ENTMCNC: 33.9 G/DL (ref 31.5–35.7)
MCHC RBC AUTO-ENTMCNC: 34.1 G/DL (ref 31.5–35.7)
MCV RBC AUTO: 86.4 FL (ref 79–97)
MCV RBC AUTO: 87 FL (ref 79–97)
MCV RBC AUTO: 87.1 FL (ref 79–97)
MODALITY: ABNORMAL
MONOCYTES # BLD AUTO: 0.5 10*3/MM3 (ref 0.1–0.9)
MONOCYTES # BLD AUTO: 0.63 10*3/MM3 (ref 0.1–0.9)
MONOCYTES NFR BLD AUTO: 4.4 % (ref 5–12)
MONOCYTES NFR BLD AUTO: 6.1 % (ref 5–12)
NEUTROPHILS NFR BLD AUTO: 12.87 10*3/MM3 (ref 1.7–7)
NEUTROPHILS NFR BLD AUTO: 5.48 10*3/MM3 (ref 1.7–7)
NEUTROPHILS NFR BLD AUTO: 67.2 % (ref 42.7–76)
NEUTROPHILS NFR BLD AUTO: 89.3 % (ref 42.7–76)
NRBC BLD AUTO-RTO: 0 /100 WBC (ref 0–0.2)
NRBC BLD AUTO-RTO: 0 /100 WBC (ref 0–0.2)
O2 A-A PPRESDIFF RESPIRATORY: 0.2 MMHG
O2 A-A PPRESDIFF RESPIRATORY: 0.3 MMHG
O2 A-A PPRESDIFF RESPIRATORY: 0.4 MMHG
PCO2 BLDA: 42.4 MM HG (ref 35–45)
PCO2 BLDA: 43.7 MM HG (ref 35–45)
PCO2 BLDA: 44.6 MM HG (ref 35–45)
PCO2 BLDA: 45 MM HG (ref 35–45)
PCO2 BLDA: 46.8 MM HG (ref 35–45)
PCO2 BLDA: 49.6 MM HG (ref 35–45)
PCO2 BLDA: 50.1 MM HG (ref 35–45)
PCO2 BLDA: 57.2 MM HG (ref 35–45)
PCO2 BLDA: 57.7 MM HG (ref 35–45)
PCO2 BLDA: 57.8 MM HG (ref 35–45)
PEEP RESPIRATORY: 10 CM[H2O]
PEEP RESPIRATORY: 10 CM[H2O]
PEEP RESPIRATORY: 7.5 CM[H2O]
PH BLDA: 7.33 PH UNITS (ref 7.35–7.6)
PH BLDA: 7.34 PH UNITS (ref 7.35–7.45)
PH BLDA: 7.34 PH UNITS (ref 7.35–7.45)
PH BLDA: 7.34 PH UNITS (ref 7.35–7.6)
PH BLDA: 7.34 PH UNITS (ref 7.35–7.6)
PH BLDA: 7.36 PH UNITS (ref 7.35–7.45)
PH BLDA: 7.37 PH UNITS (ref 7.35–7.6)
PH BLDA: 7.38 PH UNITS (ref 7.35–7.6)
PH BLDA: 7.38 PH UNITS (ref 7.35–7.6)
PH BLDA: 7.4 PH UNITS (ref 7.35–7.6)
PHOSPHATE SERPL-MCNC: 3.1 MG/DL (ref 2.5–4.5)
PHOSPHATE SERPL-MCNC: 4.1 MG/DL (ref 2.5–4.5)
PLATELET # BLD AUTO: 103 10*3/MM3 (ref 140–450)
PLATELET # BLD AUTO: 105 10*3/MM3 (ref 140–450)
PLATELET # BLD AUTO: 148 10*3/MM3 (ref 140–450)
PMV BLD AUTO: 10 FL (ref 6–12)
PMV BLD AUTO: 10.4 FL (ref 6–12)
PMV BLD AUTO: 9.9 FL (ref 6–12)
PO2 BLDA: 129.3 MM HG (ref 80–100)
PO2 BLDA: 214 MMHG (ref 80–105)
PO2 BLDA: 315 MMHG (ref 80–105)
PO2 BLDA: 376 MMHG (ref 80–105)
PO2 BLDA: 402 MMHG (ref 80–105)
PO2 BLDA: 408 MMHG (ref 80–105)
PO2 BLDA: 410 MMHG (ref 80–105)
PO2 BLDA: 46 MMHG (ref 80–105)
PO2 BLDA: 81.2 MM HG (ref 80–100)
PO2 BLDA: 90.4 MM HG (ref 80–100)
POTASSIUM BLDA-SCNC: 4.2 MMOL/L (ref 3.5–4.9)
POTASSIUM BLDA-SCNC: 4.6 MMOL/L (ref 3.5–4.9)
POTASSIUM BLDA-SCNC: 5 MMOL/L (ref 3.5–4.9)
POTASSIUM BLDA-SCNC: 5.7 MMOL/L (ref 3.5–4.9)
POTASSIUM BLDA-SCNC: 6.1 MMOL/L (ref 3.5–4.9)
POTASSIUM BLDA-SCNC: 6.2 MMOL/L (ref 3.5–4.9)
POTASSIUM BLDA-SCNC: 6.2 MMOL/L (ref 3.5–4.9)
POTASSIUM SERPL-SCNC: 4 MMOL/L (ref 3.5–5.2)
POTASSIUM SERPL-SCNC: 4.3 MMOL/L (ref 3.5–5.2)
POTASSIUM SERPL-SCNC: 4.4 MMOL/L (ref 3.5–5.2)
PROT PATTERN SERPL ELPH-IMP: ABNORMAL
PROT SERPL-MCNC: 5.9 G/DL (ref 6–8.5)
PROT SERPL-MCNC: 6.4 G/DL (ref 6–8.5)
PROTHROMBIN TIME: 17.8 SECONDS (ref 11.7–14.2)
PSV: 8 CMH2O
QT INTERVAL: 509 MS
RBC # BLD AUTO: 4.47 10*6/MM3 (ref 3.77–5.28)
RBC # BLD AUTO: 4.48 10*6/MM3 (ref 3.77–5.28)
RBC # BLD AUTO: 5.13 10*6/MM3 (ref 3.77–5.28)
SAO2 % BLDA: 100 % (ref 95–98)
SAO2 % BLDA: 80 % (ref 95–98)
SAO2 % BLDCOA: 95.1 % (ref 92–99)
SAO2 % BLDCOA: 96.4 % (ref 92–99)
SAO2 % BLDCOA: 98.8 % (ref 92–99)
SCREEN APTT: 35.1 SEC (ref 0–51.9)
SCREEN DRVVT: 33.5 SEC (ref 0–47)
SET MECH RESP RATE: 12
SET MECH RESP RATE: 16
SET MECH RESP RATE: 16
SODIUM SERPL-SCNC: 134 MMOL/L (ref 136–145)
SODIUM SERPL-SCNC: 134 MMOL/L (ref 136–145)
SODIUM SERPL-SCNC: 136 MMOL/L (ref 136–145)
THROMBIN TIME: 70.6 SEC (ref 0–23)
TOTAL RATE: 12 BREATHS/MINUTE
TOTAL RATE: 16 BREATHS/MINUTE
TOTAL RATE: 16 BREATHS/MINUTE
TT IMM NP PPP: 54.8 SEC (ref 0–23)
TT P HPASE PPP: 19.4 SEC (ref 0–23)
VENTILATOR MODE: ABNORMAL
VENTILATOR MODE: AC
VENTILATOR MODE: AC
VT ON VENT VENT: 510 ML
VT ON VENT VENT: 550 ML
VT ON VENT VENT: 550 ML
WBC # BLD AUTO: 10.86 10*3/MM3 (ref 3.4–10.8)
WBC # BLD AUTO: 14.41 10*3/MM3 (ref 3.4–10.8)
WBC # BLD AUTO: 8.16 10*3/MM3 (ref 3.4–10.8)

## 2021-05-03 PROCEDURE — 85347 COAGULATION TIME ACTIVATED: CPT

## 2021-05-03 PROCEDURE — 94799 UNLISTED PULMONARY SVC/PX: CPT

## 2021-05-03 PROCEDURE — 80048 BASIC METABOLIC PNL TOTAL CA: CPT | Performed by: THORACIC SURGERY (CARDIOTHORACIC VASCULAR SURGERY)

## 2021-05-03 PROCEDURE — 36600 WITHDRAWAL OF ARTERIAL BLOOD: CPT

## 2021-05-03 PROCEDURE — 99232 SBSQ HOSP IP/OBS MODERATE 35: CPT | Performed by: INTERNAL MEDICINE

## 2021-05-03 PROCEDURE — 33430 REPLACEMENT OF MITRAL VALVE: CPT | Performed by: PHYSICIAN ASSISTANT

## 2021-05-03 PROCEDURE — 82947 ASSAY GLUCOSE BLOOD QUANT: CPT

## 2021-05-03 PROCEDURE — 93318 ECHO TRANSESOPHAGEAL INTRAOP: CPT | Performed by: ANESTHESIOLOGY

## 2021-05-03 PROCEDURE — 93005 ELECTROCARDIOGRAM TRACING: CPT | Performed by: NURSE PRACTITIONER

## 2021-05-03 PROCEDURE — 82803 BLOOD GASES ANY COMBINATION: CPT

## 2021-05-03 PROCEDURE — 25010000002 PHENYLEPHRINE PER 1 ML: Performed by: ANESTHESIOLOGY

## 2021-05-03 PROCEDURE — 25010000002 ALBUMIN HUMAN 5% PER 50 ML: Performed by: NURSE PRACTITIONER

## 2021-05-03 PROCEDURE — 25010000002 PROTAMINE SULFATE PER 10 MG: Performed by: ANESTHESIOLOGY

## 2021-05-03 PROCEDURE — 71045 X-RAY EXAM CHEST 1 VIEW: CPT

## 2021-05-03 PROCEDURE — C1889 IMPLANT/INSERT DEVICE, NOC: HCPCS | Performed by: THORACIC SURGERY (CARDIOTHORACIC VASCULAR SURGERY)

## 2021-05-03 PROCEDURE — 33405 REPLACEMENT AORTIC VALVE OPN: CPT | Performed by: THORACIC SURGERY (CARDIOTHORACIC VASCULAR SURGERY)

## 2021-05-03 PROCEDURE — 94060 EVALUATION OF WHEEZING: CPT

## 2021-05-03 PROCEDURE — 25010000002 MAGNESIUM SULFATE PER 500 MG OF MAGNESIUM: Performed by: ANESTHESIOLOGY

## 2021-05-03 PROCEDURE — 25010000002 MAGNESIUM SULFATE IN D5W 1G/100ML (PREMIX) 1-5 GM/100ML-% SOLUTION: Performed by: NURSE PRACTITIONER

## 2021-05-03 PROCEDURE — C1713 ANCHOR/SCREW BN/BN,TIS/BN: HCPCS | Performed by: THORACIC SURGERY (CARDIOTHORACIC VASCULAR SURGERY)

## 2021-05-03 PROCEDURE — 02RG08Z REPLACEMENT OF MITRAL VALVE WITH ZOOPLASTIC TISSUE, OPEN APPROACH: ICD-10-PCS | Performed by: THORACIC SURGERY (CARDIOTHORACIC VASCULAR SURGERY)

## 2021-05-03 PROCEDURE — 63710000001 INSULIN REGULAR HUMAN PER 5 UNITS: Performed by: ANESTHESIOLOGY

## 2021-05-03 PROCEDURE — 25010000002 ALBUMIN HUMAN 25% PER 50 ML

## 2021-05-03 PROCEDURE — 25010000002 PROPOFOL 10 MG/ML EMULSION: Performed by: ANESTHESIOLOGY

## 2021-05-03 PROCEDURE — 94002 VENT MGMT INPAT INIT DAY: CPT

## 2021-05-03 PROCEDURE — 82330 ASSAY OF CALCIUM: CPT | Performed by: NURSE PRACTITIONER

## 2021-05-03 PROCEDURE — P9041 ALBUMIN (HUMAN),5%, 50ML: HCPCS | Performed by: NURSE PRACTITIONER

## 2021-05-03 PROCEDURE — 85610 PROTHROMBIN TIME: CPT | Performed by: NURSE PRACTITIONER

## 2021-05-03 PROCEDURE — 88311 DECALCIFY TISSUE: CPT | Performed by: THORACIC SURGERY (CARDIOTHORACIC VASCULAR SURGERY)

## 2021-05-03 PROCEDURE — 83735 ASSAY OF MAGNESIUM: CPT | Performed by: NURSE PRACTITIONER

## 2021-05-03 PROCEDURE — 85027 COMPLETE CBC AUTOMATED: CPT | Performed by: NURSE PRACTITIONER

## 2021-05-03 PROCEDURE — 25010000002 MIDAZOLAM PER 1 MG: Performed by: ANESTHESIOLOGY

## 2021-05-03 PROCEDURE — 88307 TISSUE EXAM BY PATHOLOGIST: CPT | Performed by: THORACIC SURGERY (CARDIOTHORACIC VASCULAR SURGERY)

## 2021-05-03 PROCEDURE — 33120 EXC ICAR TUM RESC W/CARD BYP: CPT | Performed by: PHYSICIAN ASSISTANT

## 2021-05-03 PROCEDURE — 25010000002 HEPARIN (PORCINE) PER 1000 UNITS: Performed by: ANESTHESIOLOGY

## 2021-05-03 PROCEDURE — 85730 THROMBOPLASTIN TIME PARTIAL: CPT | Performed by: NURSE PRACTITIONER

## 2021-05-03 PROCEDURE — 25010000002 NEOSTIGMINE 5 MG/10ML SOLUTION: Performed by: ANESTHESIOLOGY

## 2021-05-03 PROCEDURE — 33405 REPLACEMENT AORTIC VALVE OPN: CPT | Performed by: PHYSICIAN ASSISTANT

## 2021-05-03 PROCEDURE — 85384 FIBRINOGEN ACTIVITY: CPT | Performed by: NURSE PRACTITIONER

## 2021-05-03 PROCEDURE — C1751 CATH, INF, PER/CENT/MIDLINE: HCPCS | Performed by: ANESTHESIOLOGY

## 2021-05-03 PROCEDURE — 33430 REPLACEMENT OF MITRAL VALVE: CPT | Performed by: THORACIC SURGERY (CARDIOTHORACIC VASCULAR SURGERY)

## 2021-05-03 PROCEDURE — 94760 N-INVAS EAR/PLS OXIMETRY 1: CPT

## 2021-05-03 PROCEDURE — 85014 HEMATOCRIT: CPT

## 2021-05-03 PROCEDURE — 85018 HEMOGLOBIN: CPT

## 2021-05-03 PROCEDURE — 25010000002 FENTANYL CITRATE (PF) 100 MCG/2ML SOLUTION: Performed by: ANESTHESIOLOGY

## 2021-05-03 PROCEDURE — P9047 ALBUMIN (HUMAN), 25%, 50ML: HCPCS

## 2021-05-03 PROCEDURE — 85025 COMPLETE CBC W/AUTO DIFF WBC: CPT | Performed by: NURSE PRACTITIONER

## 2021-05-03 PROCEDURE — 25010000003 MILRINONE LACTATE 10 MG/10ML SOLUTION 10 ML VIAL: Performed by: ANESTHESIOLOGY

## 2021-05-03 PROCEDURE — 85025 COMPLETE CBC W/AUTO DIFF WBC: CPT | Performed by: INTERNAL MEDICINE

## 2021-05-03 PROCEDURE — 25010000003 PROTAMINE SULFATE PER 10 MG: Performed by: THORACIC SURGERY (CARDIOTHORACIC VASCULAR SURGERY)

## 2021-05-03 PROCEDURE — 82962 GLUCOSE BLOOD TEST: CPT

## 2021-05-03 PROCEDURE — 33120 EXC ICAR TUM RESC W/CARD BYP: CPT | Performed by: THORACIC SURGERY (CARDIOTHORACIC VASCULAR SURGERY)

## 2021-05-03 PROCEDURE — 25010000002 METOCLOPRAMIDE PER 10 MG: Performed by: NURSE PRACTITIONER

## 2021-05-03 PROCEDURE — 02BL0ZZ EXCISION OF LEFT VENTRICLE, OPEN APPROACH: ICD-10-PCS | Performed by: THORACIC SURGERY (CARDIOTHORACIC VASCULAR SURGERY)

## 2021-05-03 PROCEDURE — 25010000002 MORPHINE PER 10 MG: Performed by: NURSE PRACTITIONER

## 2021-05-03 PROCEDURE — C1729 CATH, DRAINAGE: HCPCS | Performed by: THORACIC SURGERY (CARDIOTHORACIC VASCULAR SURGERY)

## 2021-05-03 PROCEDURE — 88305 TISSUE EXAM BY PATHOLOGIST: CPT | Performed by: THORACIC SURGERY (CARDIOTHORACIC VASCULAR SURGERY)

## 2021-05-03 PROCEDURE — 25010000002 VANCOMYCIN 10 G RECONSTITUTED SOLUTION: Performed by: NURSE PRACTITIONER

## 2021-05-03 PROCEDURE — 5A1221Z PERFORMANCE OF CARDIAC OUTPUT, CONTINUOUS: ICD-10-PCS | Performed by: THORACIC SURGERY (CARDIOTHORACIC VASCULAR SURGERY)

## 2021-05-03 PROCEDURE — 93010 ELECTROCARDIOGRAM REPORT: CPT | Performed by: INTERNAL MEDICINE

## 2021-05-03 PROCEDURE — 80069 RENAL FUNCTION PANEL: CPT | Performed by: NURSE PRACTITIONER

## 2021-05-03 PROCEDURE — 25010000002 VANCOMYCIN 1 G RECONSTITUTED SOLUTION

## 2021-05-03 PROCEDURE — 25010000002 HEPARIN (PORCINE) PER 1000 UNITS

## 2021-05-03 PROCEDURE — 80076 HEPATIC FUNCTION PANEL: CPT | Performed by: INTERNAL MEDICINE

## 2021-05-03 PROCEDURE — B24BZZ4 ULTRASONOGRAPHY OF HEART WITH AORTA, TRANSESOPHAGEAL: ICD-10-PCS | Performed by: THORACIC SURGERY (CARDIOTHORACIC VASCULAR SURGERY)

## 2021-05-03 PROCEDURE — 81025 URINE PREGNANCY TEST: CPT | Performed by: ANESTHESIOLOGY

## 2021-05-03 PROCEDURE — 02RF08Z REPLACEMENT OF AORTIC VALVE WITH ZOOPLASTIC TISSUE, OPEN APPROACH: ICD-10-PCS | Performed by: THORACIC SURGERY (CARDIOTHORACIC VASCULAR SURGERY)

## 2021-05-03 DEVICE — SS SUTURE, 4 PER SLEEVE
Type: IMPLANTABLE DEVICE | Site: HEART | Status: FUNCTIONAL
Brand: MYO/WIRE II

## 2021-05-03 DEVICE — SS SUTURE, 3 PER SLEEVE
Type: IMPLANTABLE DEVICE | Site: STERNUM | Status: FUNCTIONAL
Brand: MYO/WIRE II

## 2021-05-03 DEVICE — VLV AORTA INSPRIS RESILIA 21MM: Type: IMPLANTABLE DEVICE | Site: HEART | Status: FUNCTIONAL

## 2021-05-03 DEVICE — COR-KNOT® QUICK LOAD® SINGLES
Type: IMPLANTABLE DEVICE | Site: HEART | Status: FUNCTIONAL
Brand: COR-KNOT® QUICK LOAD®

## 2021-05-03 DEVICE — SS SUTURE, 4 PER SLEEVE
Type: IMPLANTABLE DEVICE | Site: STERNUM | Status: FUNCTIONAL
Brand: MYO/WIRE II

## 2021-05-03 DEVICE — COR-KNOT MINI® COMBO KITBASE PACKAGE TYPE - KITEACH STERILE PACKAGE KIT CONTAINS (2) SINGLE PATIENT USE COR-KNOT MINI® DEVICES AND (12) COR-KNOT® QUICK LOADS®.
Type: IMPLANTABLE DEVICE | Site: HEART | Status: FUNCTIONAL
Brand: COR-KNOT MINI®

## 2021-05-03 DEVICE — VLV MITRAL EPIC PORCN 29MM: Type: IMPLANTABLE DEVICE | Site: HEART | Status: FUNCTIONAL

## 2021-05-03 RX ORDER — SODIUM CHLORIDE 9 MG/ML
INJECTION, SOLUTION INTRAVENOUS CONTINUOUS PRN
Status: DISCONTINUED | OUTPATIENT
Start: 2021-05-03 | End: 2021-05-03 | Stop reason: SURG

## 2021-05-03 RX ORDER — NEOSTIGMINE METHYLSULFATE 0.5 MG/ML
INJECTION, SOLUTION INTRAVENOUS AS NEEDED
Status: DISCONTINUED | OUTPATIENT
Start: 2021-05-03 | End: 2021-05-03 | Stop reason: SURG

## 2021-05-03 RX ORDER — SODIUM CHLORIDE 0.9 % (FLUSH) 0.9 %
3-10 SYRINGE (ML) INJECTION AS NEEDED
Status: DISCONTINUED | OUTPATIENT
Start: 2021-05-03 | End: 2021-05-03 | Stop reason: HOSPADM

## 2021-05-03 RX ORDER — ROCURONIUM BROMIDE 10 MG/ML
INJECTION, SOLUTION INTRAVENOUS AS NEEDED
Status: DISCONTINUED | OUTPATIENT
Start: 2021-05-03 | End: 2021-05-03 | Stop reason: SURG

## 2021-05-03 RX ORDER — GLYCOPYRROLATE 0.2 MG/ML
INJECTION INTRAMUSCULAR; INTRAVENOUS AS NEEDED
Status: DISCONTINUED | OUTPATIENT
Start: 2021-05-03 | End: 2021-05-03 | Stop reason: SURG

## 2021-05-03 RX ORDER — MORPHINE SULFATE 2 MG/ML
4 INJECTION, SOLUTION INTRAMUSCULAR; INTRAVENOUS
Status: DISCONTINUED | OUTPATIENT
Start: 2021-05-03 | End: 2021-05-11 | Stop reason: HOSPADM

## 2021-05-03 RX ORDER — BISACODYL 10 MG
10 SUPPOSITORY, RECTAL RECTAL DAILY PRN
Status: DISCONTINUED | OUTPATIENT
Start: 2021-05-04 | End: 2021-05-11 | Stop reason: HOSPADM

## 2021-05-03 RX ORDER — MAGNESIUM SULFATE HEPTAHYDRATE 500 MG/ML
INJECTION, SOLUTION INTRAMUSCULAR; INTRAVENOUS AS NEEDED
Status: DISCONTINUED | OUTPATIENT
Start: 2021-05-03 | End: 2021-05-03 | Stop reason: SURG

## 2021-05-03 RX ORDER — MILRINONE LACTATE 0.2 MG/ML
.25-.375 INJECTION, SOLUTION INTRAVENOUS CONTINUOUS PRN
Status: DISCONTINUED | OUTPATIENT
Start: 2021-05-03 | End: 2021-05-05

## 2021-05-03 RX ORDER — DOPAMINE HYDROCHLORIDE 160 MG/100ML
2-20 INJECTION, SOLUTION INTRAVENOUS CONTINUOUS PRN
Status: DISCONTINUED | OUTPATIENT
Start: 2021-05-03 | End: 2021-05-04

## 2021-05-03 RX ORDER — ALPRAZOLAM 0.25 MG/1
0.25 TABLET ORAL EVERY 8 HOURS PRN
Status: DISCONTINUED | OUTPATIENT
Start: 2021-05-03 | End: 2021-05-11 | Stop reason: HOSPADM

## 2021-05-03 RX ORDER — ACETAMINOPHEN 325 MG/1
650 TABLET ORAL EVERY 4 HOURS
Status: DISCONTINUED | OUTPATIENT
Start: 2021-05-03 | End: 2021-05-04

## 2021-05-03 RX ORDER — LIDOCAINE HYDROCHLORIDE 20 MG/ML
INJECTION, SOLUTION INFILTRATION; PERINEURAL AS NEEDED
Status: DISCONTINUED | OUTPATIENT
Start: 2021-05-03 | End: 2021-05-03 | Stop reason: SURG

## 2021-05-03 RX ORDER — SODIUM CHLORIDE, SODIUM LACTATE, POTASSIUM CHLORIDE, CALCIUM CHLORIDE 600; 310; 30; 20 MG/100ML; MG/100ML; MG/100ML; MG/100ML
9 INJECTION, SOLUTION INTRAVENOUS CONTINUOUS
Status: DISCONTINUED | OUTPATIENT
Start: 2021-05-03 | End: 2021-05-03

## 2021-05-03 RX ORDER — POTASSIUM CHLORIDE 29.8 MG/ML
20 INJECTION INTRAVENOUS
Status: DISCONTINUED | OUTPATIENT
Start: 2021-05-03 | End: 2021-05-11 | Stop reason: HOSPADM

## 2021-05-03 RX ORDER — POTASSIUM CHLORIDE 1.5 G/1.77G
40 POWDER, FOR SOLUTION ORAL AS NEEDED
Status: DISCONTINUED | OUTPATIENT
Start: 2021-05-03 | End: 2021-05-11 | Stop reason: HOSPADM

## 2021-05-03 RX ORDER — PROTAMINE SULFATE 10 MG/ML
INJECTION, SOLUTION INTRAVENOUS AS NEEDED
Status: DISCONTINUED | OUTPATIENT
Start: 2021-05-03 | End: 2021-05-03 | Stop reason: SURG

## 2021-05-03 RX ORDER — SODIUM CHLORIDE 9 MG/ML
30 INJECTION, SOLUTION INTRAVENOUS CONTINUOUS
Status: DISCONTINUED | OUTPATIENT
Start: 2021-05-03 | End: 2021-05-07

## 2021-05-03 RX ORDER — NITROGLYCERIN 5 MG/ML
INJECTION, SOLUTION INTRAVENOUS AS NEEDED
Status: DISCONTINUED | OUTPATIENT
Start: 2021-05-03 | End: 2021-05-03 | Stop reason: SURG

## 2021-05-03 RX ORDER — CHLORHEXIDINE GLUCONATE 0.12 MG/ML
15 RINSE ORAL EVERY 12 HOURS
Status: DISCONTINUED | OUTPATIENT
Start: 2021-05-03 | End: 2021-05-11 | Stop reason: HOSPADM

## 2021-05-03 RX ORDER — POTASSIUM CHLORIDE 7.45 MG/ML
10 INJECTION INTRAVENOUS
Status: DISCONTINUED | OUTPATIENT
Start: 2021-05-03 | End: 2021-05-11 | Stop reason: HOSPADM

## 2021-05-03 RX ORDER — ACETAMINOPHEN 325 MG/1
650 TABLET ORAL EVERY 4 HOURS PRN
Status: DISCONTINUED | OUTPATIENT
Start: 2021-05-04 | End: 2021-05-11 | Stop reason: HOSPADM

## 2021-05-03 RX ORDER — PANTOPRAZOLE SODIUM 40 MG/1
40 TABLET, DELAYED RELEASE ORAL EVERY MORNING
Status: DISCONTINUED | OUTPATIENT
Start: 2021-05-04 | End: 2021-05-11 | Stop reason: HOSPADM

## 2021-05-03 RX ORDER — ACETAMINOPHEN 160 MG/5ML
650 SOLUTION ORAL EVERY 4 HOURS PRN
Status: DISCONTINUED | OUTPATIENT
Start: 2021-05-04 | End: 2021-05-11 | Stop reason: HOSPADM

## 2021-05-03 RX ORDER — FENTANYL CITRATE 50 UG/ML
50 INJECTION, SOLUTION INTRAMUSCULAR; INTRAVENOUS
Status: DISCONTINUED | OUTPATIENT
Start: 2021-05-03 | End: 2021-05-03 | Stop reason: HOSPADM

## 2021-05-03 RX ORDER — AMINOCAPROIC ACID 250 MG/ML
INJECTION, SOLUTION INTRAVENOUS AS NEEDED
Status: DISCONTINUED | OUTPATIENT
Start: 2021-05-03 | End: 2021-05-03 | Stop reason: SURG

## 2021-05-03 RX ORDER — NALOXONE HCL 0.4 MG/ML
0.4 VIAL (ML) INJECTION
Status: DISCONTINUED | OUTPATIENT
Start: 2021-05-03 | End: 2021-05-11 | Stop reason: HOSPADM

## 2021-05-03 RX ORDER — HYDROCODONE BITARTRATE AND ACETAMINOPHEN 5; 325 MG/1; MG/1
2 TABLET ORAL EVERY 4 HOURS PRN
Status: DISCONTINUED | OUTPATIENT
Start: 2021-05-03 | End: 2021-05-11 | Stop reason: HOSPADM

## 2021-05-03 RX ORDER — POLYETHYLENE GLYCOL 3350 17 G/17G
17 POWDER, FOR SOLUTION ORAL DAILY
Status: DISCONTINUED | OUTPATIENT
Start: 2021-05-04 | End: 2021-05-11 | Stop reason: HOSPADM

## 2021-05-03 RX ORDER — NOREPINEPHRINE BIT/0.9 % NACL 8 MG/250ML
.02-.3 INFUSION BOTTLE (ML) INTRAVENOUS CONTINUOUS PRN
Status: DISCONTINUED | OUTPATIENT
Start: 2021-05-03 | End: 2021-05-04

## 2021-05-03 RX ORDER — FENTANYL CITRATE 50 UG/ML
INJECTION, SOLUTION INTRAMUSCULAR; INTRAVENOUS AS NEEDED
Status: DISCONTINUED | OUTPATIENT
Start: 2021-05-03 | End: 2021-05-03 | Stop reason: SURG

## 2021-05-03 RX ORDER — LIDOCAINE HYDROCHLORIDE 10 MG/ML
0.5 INJECTION, SOLUTION EPIDURAL; INFILTRATION; INTRACAUDAL; PERINEURAL ONCE AS NEEDED
Status: DISCONTINUED | OUTPATIENT
Start: 2021-05-03 | End: 2021-05-03 | Stop reason: HOSPADM

## 2021-05-03 RX ORDER — ACETAMINOPHEN 650 MG/1
650 SUPPOSITORY RECTAL EVERY 4 HOURS
Status: DISCONTINUED | OUTPATIENT
Start: 2021-05-03 | End: 2021-05-04

## 2021-05-03 RX ORDER — DEXTROSE MONOHYDRATE 25 G/50ML
25-50 INJECTION, SOLUTION INTRAVENOUS
Status: DISCONTINUED | OUTPATIENT
Start: 2021-05-03 | End: 2021-05-03 | Stop reason: HOSPADM

## 2021-05-03 RX ORDER — SODIUM CHLORIDE 0.9 % (FLUSH) 0.9 %
30 SYRINGE (ML) INJECTION ONCE AS NEEDED
Status: COMPLETED | OUTPATIENT
Start: 2021-05-03 | End: 2021-05-06

## 2021-05-03 RX ORDER — ALBUTEROL SULFATE 2.5 MG/3ML
2.5 SOLUTION RESPIRATORY (INHALATION) ONCE AS NEEDED
Status: COMPLETED | OUTPATIENT
Start: 2021-05-03 | End: 2021-05-03

## 2021-05-03 RX ORDER — SODIUM CHLORIDE 0.9 % (FLUSH) 0.9 %
3 SYRINGE (ML) INJECTION EVERY 12 HOURS SCHEDULED
Status: DISCONTINUED | OUTPATIENT
Start: 2021-05-03 | End: 2021-05-03 | Stop reason: HOSPADM

## 2021-05-03 RX ORDER — PROTAMINE SULFATE 10 MG/ML
INJECTION, SOLUTION INTRAVENOUS AS NEEDED
Status: DISCONTINUED | OUTPATIENT
Start: 2021-05-03 | End: 2021-05-03 | Stop reason: HOSPADM

## 2021-05-03 RX ORDER — SODIUM CHLORIDE 9 MG/ML
30 INJECTION, SOLUTION INTRAVENOUS CONTINUOUS PRN
Status: DISCONTINUED | OUTPATIENT
Start: 2021-05-03 | End: 2021-05-11 | Stop reason: HOSPADM

## 2021-05-03 RX ORDER — BISACODYL 5 MG/1
10 TABLET, DELAYED RELEASE ORAL DAILY PRN
Status: DISCONTINUED | OUTPATIENT
Start: 2021-05-03 | End: 2021-05-10 | Stop reason: SDUPTHER

## 2021-05-03 RX ORDER — POTASSIUM CHLORIDE 750 MG/1
40 TABLET, FILM COATED, EXTENDED RELEASE ORAL AS NEEDED
Status: DISCONTINUED | OUTPATIENT
Start: 2021-05-03 | End: 2021-05-11 | Stop reason: HOSPADM

## 2021-05-03 RX ORDER — FAMOTIDINE 10 MG/ML
20 INJECTION, SOLUTION INTRAVENOUS ONCE
Status: COMPLETED | OUTPATIENT
Start: 2021-05-03 | End: 2021-05-03

## 2021-05-03 RX ORDER — NITROGLYCERIN 20 MG/100ML
5-200 INJECTION INTRAVENOUS
Status: DISCONTINUED | OUTPATIENT
Start: 2021-05-03 | End: 2021-05-04

## 2021-05-03 RX ORDER — PROPOFOL 10 MG/ML
VIAL (ML) INTRAVENOUS CONTINUOUS PRN
Status: DISCONTINUED | OUTPATIENT
Start: 2021-05-03 | End: 2021-05-03 | Stop reason: SURG

## 2021-05-03 RX ORDER — MAGNESIUM SULFATE 1 G/100ML
1 INJECTION INTRAVENOUS EVERY 8 HOURS
Status: COMPLETED | OUTPATIENT
Start: 2021-05-03 | End: 2021-05-04

## 2021-05-03 RX ORDER — MIDAZOLAM HYDROCHLORIDE 1 MG/ML
1 INJECTION INTRAMUSCULAR; INTRAVENOUS
Status: COMPLETED | OUTPATIENT
Start: 2021-05-03 | End: 2021-05-03

## 2021-05-03 RX ORDER — OXYCODONE HYDROCHLORIDE 5 MG/1
10 TABLET ORAL EVERY 4 HOURS PRN
Status: DISCONTINUED | OUTPATIENT
Start: 2021-05-03 | End: 2021-05-04

## 2021-05-03 RX ORDER — BUPIVACAINE HCL/0.9 % NACL/PF 0.125 %
PLASTIC BAG, INJECTION (ML) EPIDURAL AS NEEDED
Status: DISCONTINUED | OUTPATIENT
Start: 2021-05-03 | End: 2021-05-03 | Stop reason: SURG

## 2021-05-03 RX ORDER — ASPIRIN 81 MG/1
81 TABLET ORAL DAILY
Status: DISCONTINUED | OUTPATIENT
Start: 2021-05-04 | End: 2021-05-11 | Stop reason: HOSPADM

## 2021-05-03 RX ORDER — PANTOPRAZOLE SODIUM 40 MG/10ML
40 INJECTION, POWDER, LYOPHILIZED, FOR SOLUTION INTRAVENOUS ONCE
Status: COMPLETED | OUTPATIENT
Start: 2021-05-03 | End: 2021-05-03

## 2021-05-03 RX ORDER — ETOMIDATE 2 MG/ML
INJECTION INTRAVENOUS AS NEEDED
Status: DISCONTINUED | OUTPATIENT
Start: 2021-05-03 | End: 2021-05-03 | Stop reason: SURG

## 2021-05-03 RX ORDER — ATORVASTATIN CALCIUM 20 MG/1
40 TABLET, FILM COATED ORAL NIGHTLY
Status: DISCONTINUED | OUTPATIENT
Start: 2021-05-03 | End: 2021-05-06

## 2021-05-03 RX ORDER — ACETAMINOPHEN 160 MG/5ML
650 SOLUTION ORAL EVERY 4 HOURS
Status: DISCONTINUED | OUTPATIENT
Start: 2021-05-03 | End: 2021-05-04

## 2021-05-03 RX ORDER — CYCLOBENZAPRINE HCL 10 MG
10 TABLET ORAL EVERY 8 HOURS PRN
Status: DISCONTINUED | OUTPATIENT
Start: 2021-05-04 | End: 2021-05-05

## 2021-05-03 RX ORDER — ONDANSETRON 2 MG/ML
4 INJECTION INTRAMUSCULAR; INTRAVENOUS EVERY 6 HOURS PRN
Status: DISCONTINUED | OUTPATIENT
Start: 2021-05-03 | End: 2021-05-11 | Stop reason: HOSPADM

## 2021-05-03 RX ORDER — ALBUMIN, HUMAN INJ 5% 5 %
1500 SOLUTION INTRAVENOUS AS NEEDED
Status: DISPENSED | OUTPATIENT
Start: 2021-05-03 | End: 2021-05-04

## 2021-05-03 RX ORDER — ACETAMINOPHEN 650 MG/1
650 SUPPOSITORY RECTAL EVERY 4 HOURS PRN
Status: DISCONTINUED | OUTPATIENT
Start: 2021-05-04 | End: 2021-05-11 | Stop reason: HOSPADM

## 2021-05-03 RX ORDER — MIDAZOLAM HYDROCHLORIDE 1 MG/ML
INJECTION INTRAMUSCULAR; INTRAVENOUS AS NEEDED
Status: DISCONTINUED | OUTPATIENT
Start: 2021-05-03 | End: 2021-05-03 | Stop reason: SURG

## 2021-05-03 RX ORDER — MORPHINE SULFATE 2 MG/ML
1 INJECTION, SOLUTION INTRAMUSCULAR; INTRAVENOUS EVERY 4 HOURS PRN
Status: ACTIVE | OUTPATIENT
Start: 2021-05-03 | End: 2021-05-10

## 2021-05-03 RX ORDER — MIDAZOLAM HYDROCHLORIDE 1 MG/ML
2 INJECTION INTRAMUSCULAR; INTRAVENOUS
Status: DISCONTINUED | OUTPATIENT
Start: 2021-05-03 | End: 2021-05-04

## 2021-05-03 RX ORDER — MEPERIDINE HYDROCHLORIDE 25 MG/ML
25 INJECTION INTRAMUSCULAR; INTRAVENOUS; SUBCUTANEOUS EVERY 4 HOURS PRN
Status: DISCONTINUED | OUTPATIENT
Start: 2021-05-03 | End: 2021-05-04

## 2021-05-03 RX ORDER — HEPARIN SODIUM 1000 [USP'U]/ML
INJECTION, SOLUTION INTRAVENOUS; SUBCUTANEOUS AS NEEDED
Status: DISCONTINUED | OUTPATIENT
Start: 2021-05-03 | End: 2021-05-03 | Stop reason: SURG

## 2021-05-03 RX ORDER — PROPOFOL 10 MG/ML
VIAL (ML) INTRAVENOUS AS NEEDED
Status: DISCONTINUED | OUTPATIENT
Start: 2021-05-03 | End: 2021-05-03 | Stop reason: SURG

## 2021-05-03 RX ORDER — MIDAZOLAM HYDROCHLORIDE 1 MG/ML
2 INJECTION INTRAMUSCULAR; INTRAVENOUS
Status: COMPLETED | OUTPATIENT
Start: 2021-05-03 | End: 2021-05-03

## 2021-05-03 RX ORDER — AMOXICILLIN 250 MG
2 CAPSULE ORAL NIGHTLY
Status: DISCONTINUED | OUTPATIENT
Start: 2021-05-04 | End: 2021-05-11 | Stop reason: HOSPADM

## 2021-05-03 RX ORDER — METOCLOPRAMIDE HYDROCHLORIDE 5 MG/ML
10 INJECTION INTRAMUSCULAR; INTRAVENOUS EVERY 6 HOURS
Status: DISPENSED | OUTPATIENT
Start: 2021-05-03 | End: 2021-05-04

## 2021-05-03 RX ADMIN — MUPIROCIN 1 APPLICATION: 20 OINTMENT TOPICAL at 22:27

## 2021-05-03 RX ADMIN — ETOMIDATE 6 MG: 2 INJECTION, SOLUTION INTRAVENOUS at 11:06

## 2021-05-03 RX ADMIN — FENTANYL CITRATE 100 MCG: 50 INJECTION INTRAMUSCULAR; INTRAVENOUS at 14:07

## 2021-05-03 RX ADMIN — CHLORHEXIDINE GLUCONATE 15 ML: 1.2 RINSE ORAL at 22:27

## 2021-05-03 RX ADMIN — PROPOFOL 20 MG: 10 INJECTION, EMULSION INTRAVENOUS at 11:07

## 2021-05-03 RX ADMIN — ROCURONIUM BROMIDE 80 MG: 50 INJECTION INTRAVENOUS at 11:06

## 2021-05-03 RX ADMIN — MAGNESIUM SULFATE HEPTAHYDRATE 1 G: 1 INJECTION, SOLUTION INTRAVENOUS at 16:35

## 2021-05-03 RX ADMIN — VANCOMYCIN HYDROCHLORIDE 2000 MG: 10 INJECTION, POWDER, LYOPHILIZED, FOR SOLUTION INTRAVENOUS at 22:27

## 2021-05-03 RX ADMIN — ROCURONIUM BROMIDE 20 MG: 50 INJECTION INTRAVENOUS at 12:02

## 2021-05-03 RX ADMIN — NITROGLYCERIN 100 MCG: 5 INJECTION, SOLUTION INTRAVENOUS at 14:28

## 2021-05-03 RX ADMIN — DEXMEDETOMIDINE HYDROCHLORIDE 1 MCG/KG/HR: 100 INJECTION, SOLUTION, CONCENTRATE INTRAVENOUS at 11:25

## 2021-05-03 RX ADMIN — FENTANYL CITRATE 50 MCG: 50 INJECTION, SOLUTION INTRAMUSCULAR; INTRAVENOUS at 10:09

## 2021-05-03 RX ADMIN — NEOSTIGMINE METHYLSULFATE 5 MG: 0.5 INJECTION INTRAVENOUS at 15:32

## 2021-05-03 RX ADMIN — CEFAZOLIN 3 G: 1 INJECTION, POWDER, FOR SOLUTION INTRAMUSCULAR; INTRAVENOUS; PARENTERAL at 14:04

## 2021-05-03 RX ADMIN — PROPOFOL 40 MG: 10 INJECTION, EMULSION INTRAVENOUS at 11:06

## 2021-05-03 RX ADMIN — NITROGLYCERIN 200 MCG: 5 INJECTION, SOLUTION INTRAVENOUS at 14:16

## 2021-05-03 RX ADMIN — PANTOPRAZOLE SODIUM 40 MG: 40 INJECTION, POWDER, FOR SOLUTION INTRAVENOUS at 15:46

## 2021-05-03 RX ADMIN — MIDAZOLAM 1 MG: 1 INJECTION INTRAMUSCULAR; INTRAVENOUS at 10:15

## 2021-05-03 RX ADMIN — SODIUM CHLORIDE 30 ML/HR: 9 INJECTION, SOLUTION INTRAVENOUS at 15:56

## 2021-05-03 RX ADMIN — METOCLOPRAMIDE HYDROCHLORIDE 10 MG: 5 INJECTION INTRAMUSCULAR; INTRAVENOUS at 22:27

## 2021-05-03 RX ADMIN — DEXMEDETOMIDINE HYDROCHLORIDE 0.5 MCG/KG/HR: 100 INJECTION, SOLUTION, CONCENTRATE INTRAVENOUS at 15:54

## 2021-05-03 RX ADMIN — BUDESONIDE AND FORMOTEROL FUMARATE DIHYDRATE 2 PUFF: 160; 4.5 AEROSOL RESPIRATORY (INHALATION) at 06:53

## 2021-05-03 RX ADMIN — DEXMEDETOMIDINE HYDROCHLORIDE 0.8 MCG/KG/HR: 100 INJECTION, SOLUTION, CONCENTRATE INTRAVENOUS at 21:33

## 2021-05-03 RX ADMIN — NITROGLYCERIN 200 MCG: 5 INJECTION, SOLUTION INTRAVENOUS at 14:18

## 2021-05-03 RX ADMIN — FENTANYL CITRATE 150 MCG: 50 INJECTION INTRAMUSCULAR; INTRAVENOUS at 14:03

## 2021-05-03 RX ADMIN — MIDAZOLAM 2 MG: 1 INJECTION INTRAMUSCULAR; INTRAVENOUS at 11:04

## 2021-05-03 RX ADMIN — GLYCOPYRROLATE 0.9 MCG: 0.2 INJECTION INTRAMUSCULAR; INTRAVENOUS at 15:32

## 2021-05-03 RX ADMIN — PHENYLEPHRINE-NACL PREF SYR 1 MG/10ML-0.9% (100 MCG/ML) 100 MCG: 1-0.9/1 SOLUTION PREFILLED SYRINGE at 11:07

## 2021-05-03 RX ADMIN — PROPOFOL 100 MG: 10 INJECTION, EMULSION INTRAVENOUS at 14:15

## 2021-05-03 RX ADMIN — LIDOCAINE HYDROCHLORIDE 100 MG: 20 INJECTION, SOLUTION INFILTRATION; PERINEURAL at 11:06

## 2021-05-03 RX ADMIN — PROPOFOL 50 MCG/KG/MIN: 10 INJECTION, EMULSION INTRAVENOUS at 11:23

## 2021-05-03 RX ADMIN — HEPARIN SODIUM 35000 UNITS: 1000 INJECTION INTRAVENOUS; SUBCUTANEOUS at 11:44

## 2021-05-03 RX ADMIN — NICARDIPINE HYDROCHLORIDE 5 MG/HR: 2.5 INJECTION, SOLUTION INTRAVENOUS at 14:56

## 2021-05-03 RX ADMIN — ROCURONIUM BROMIDE 30 MG: 50 INJECTION INTRAVENOUS at 14:03

## 2021-05-03 RX ADMIN — MORPHINE SULFATE 4 MG: 2 INJECTION, SOLUTION INTRAMUSCULAR; INTRAVENOUS at 18:40

## 2021-05-03 RX ADMIN — NITROGLYCERIN 100 MCG: 5 INJECTION, SOLUTION INTRAVENOUS at 14:33

## 2021-05-03 RX ADMIN — FENTANYL CITRATE 250 MCG: 50 INJECTION INTRAMUSCULAR; INTRAVENOUS at 11:06

## 2021-05-03 RX ADMIN — SODIUM CHLORIDE 30 ML/HR: 9 INJECTION, SOLUTION INTRAVENOUS at 15:54

## 2021-05-03 RX ADMIN — AMINOCAPROIC ACID 10 G: 250 INJECTION, SOLUTION INTRAVENOUS at 11:51

## 2021-05-03 RX ADMIN — SODIUM CHLORIDE 75 ML/HR: 9 INJECTION, SOLUTION INTRAVENOUS at 10:10

## 2021-05-03 RX ADMIN — MORPHINE SULFATE 2 MG: 2 INJECTION, SOLUTION INTRAMUSCULAR; INTRAVENOUS at 22:59

## 2021-05-03 RX ADMIN — FENTANYL CITRATE 50 MCG: 50 INJECTION, SOLUTION INTRAMUSCULAR; INTRAVENOUS at 10:15

## 2021-05-03 RX ADMIN — AMINOCAPROIC ACID 10 G: 250 INJECTION, SOLUTION INTRAVENOUS at 14:14

## 2021-05-03 RX ADMIN — CHLORHEXIDINE GLUCONATE 15 ML: 1.2 RINSE ORAL at 08:45

## 2021-05-03 RX ADMIN — FAMOTIDINE 20 MG: 10 INJECTION INTRAVENOUS at 10:09

## 2021-05-03 RX ADMIN — ALBUTEROL SULFATE 2.5 MG: 2.5 SOLUTION RESPIRATORY (INHALATION) at 05:50

## 2021-05-03 RX ADMIN — MIDAZOLAM 1 MG: 1 INJECTION INTRAMUSCULAR; INTRAVENOUS at 10:09

## 2021-05-03 RX ADMIN — DEXMEDETOMIDINE HYDROCHLORIDE 0.5 MCG/KG/HR: 100 INJECTION, SOLUTION, CONCENTRATE INTRAVENOUS at 16:54

## 2021-05-03 RX ADMIN — MUPIROCIN 1 APPLICATION: 20 OINTMENT TOPICAL at 08:45

## 2021-05-03 RX ADMIN — PHENYLEPHRINE HYDROCHLORIDE 0.3 MCG/KG/MIN: 10 INJECTION, SOLUTION INTRAMUSCULAR; INTRAVENOUS; SUBCUTANEOUS at 11:07

## 2021-05-03 RX ADMIN — SODIUM CHLORIDE 0.25 MCG/KG/MIN: 0.9 INJECTION, SOLUTION INTRAVENOUS at 13:45

## 2021-05-03 RX ADMIN — NITROGLYCERIN 200 MCG: 5 INJECTION, SOLUTION INTRAVENOUS at 14:23

## 2021-05-03 RX ADMIN — NITROGLYCERIN 100 MCG: 5 INJECTION, SOLUTION INTRAVENOUS at 14:48

## 2021-05-03 RX ADMIN — PROTAMINE SULFATE 350 MG: 10 INJECTION, SOLUTION INTRAVENOUS at 14:08

## 2021-05-03 RX ADMIN — MAGNESIUM SULFATE HEPTAHYDRATE 2 G: 500 INJECTION, SOLUTION INTRAMUSCULAR; INTRAVENOUS at 13:49

## 2021-05-03 RX ADMIN — SODIUM CHLORIDE: 9 INJECTION, SOLUTION INTRAVENOUS at 10:52

## 2021-05-03 RX ADMIN — FENTANYL CITRATE 100 MCG: 50 INJECTION INTRAMUSCULAR; INTRAVENOUS at 14:48

## 2021-05-03 RX ADMIN — METOPROLOL TARTRATE 12.5 MG: 25 TABLET, FILM COATED ORAL at 08:45

## 2021-05-03 RX ADMIN — ALBUMIN HUMAN 250 ML: 0.05 INJECTION, SOLUTION INTRAVENOUS at 18:40

## 2021-05-03 RX ADMIN — SODIUM CHLORIDE 6 UNITS/HR: 9 INJECTION, SOLUTION INTRAVENOUS at 11:53

## 2021-05-03 RX ADMIN — CEFAZOLIN 3 G: 1 INJECTION, POWDER, FOR SOLUTION INTRAMUSCULAR; INTRAVENOUS; PARENTERAL at 11:16

## 2021-05-03 NOTE — ANESTHESIA PREPROCEDURE EVALUATION
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

## 2021-05-03 NOTE — ANESTHESIA PROCEDURE NOTES
Arterial Line      Patient reassessed immediately prior to procedure    Patient location during procedure: holding area  Start time: 5/3/2021 10:05 AM  Stop Time:5/3/2021 10:08 AM       Line placed for hemodynamic monitoring and ABGs/Labs/ISTAT.  Performed By   Anesthesiologist: Harsha Solorzano MD  Preanesthetic Checklist  Completed: patient identified, IV checked, site marked, risks and benefits discussed, surgical consent, monitors and equipment checked, pre-op evaluation and timeout performed  Arterial Line Prep   Sterile Tech: cap, gloves and mask  Prep: ChloraPrep  Patient monitoring: blood pressure monitoring, continuous pulse oximetry and EKG  Arterial Line Procedure   Laterality:right  Location:  radial artery  Catheter size: 20 G   Guidance: palpation technique  Number of attempts: 1  Successful placement: yes  Post Assessment   Dressing Type: biopatch applied, occlusive dressing applied, secured with tape and wrist guard applied.   Complications no  Circ/Move/Sens Assessment: normal and unchanged.   Patient Tolerance: patient tolerated the procedure well with no apparent complications

## 2021-05-03 NOTE — ANESTHESIA PROCEDURE NOTES
Central Line      Patient reassessed immediately prior to procedure    Patient location during procedure: holding area  Start time: 5/3/2021 10:25 AM  Stop Time:5/3/2021 10:33 AM  Indications: vascular access and central pressure monitoring  Staff  Anesthesiologist: Harsha Solorzano MD  Preanesthetic Checklist  Completed: patient identified, IV checked, site marked, risks and benefits discussed, surgical consent, monitors and equipment checked, pre-op evaluation and timeout performed  Central Line Prep  Sterile Tech:cap, gloves, gown, mask and sterile barriers  Prep: chloraprep  Patient monitoring: blood pressure monitoring, continuous pulse oximetry and EKG  Central Line Procedure  Laterality:right  Location:internal jugular  Catheter Type:Vinton-Jessica  Catheter Size:8.5 Fr  Guidance:landmark technique  Assessment  Post procedure:biopatch applied, line sutured and occlusive dressing applied  Assessement:blood return through all ports and free fluid flow  Complications:no  Patient Tolerance:patient tolerated the procedure well with no apparent complications

## 2021-05-03 NOTE — ANESTHESIA PROCEDURE NOTES
Central Line      Patient location during procedure: holding area  Start time: 5/3/2021 10:11 AM  Stop Time:5/3/2021 10:25 AM  Indications: vascular access and central pressure monitoring  Staff  Anesthesiologist: Harsha Solorzano MD  Preanesthetic Checklist  Completed: patient identified, IV checked, site marked, risks and benefits discussed, surgical consent, monitors and equipment checked, pre-op evaluation and timeout performed  Central Line Prep  Sterile Tech:cap, gloves, gown, mask and sterile barriers  Prep: chloraprep  Patient monitoring: blood pressure monitoring, continuous pulse oximetry and EKG  Central Line Procedure  Laterality:right  Location:internal jugular  Catheter Type:Cordis and double lumen  Catheter Size:9 Fr  Guidance:landmark technique  Assessment  Post procedure:biopatch applied, line sutured and occlusive dressing applied  Assessement:blood return through all ports, free fluid flow, chest x-ray ordered and Evan Test  Complications:no  Patient Tolerance:patient tolerated the procedure well with no apparent complications

## 2021-05-03 NOTE — ANESTHESIA PROCEDURE NOTES
Preanesthesia Checklist:  Patient identified and IV assessed.    General Procedure Information  Diagnostic Indications for Echo:  assessment of ascending aorta, assessment of surgical repair, defect repair evaluation and hemodynamic monitoring  Physician Requesting Echo: Jr Daniel Noguera MD  ICD Code for Medical Necessity:  Atherosclerotic aortic disease   Location performed:  OR  Intubated  Bite block placed  Heart visualized  Probe Insertion:  Easy  Probe Type:  Multiplane  Modalities:  2D only, color flow mapping, continuous wave Doppler and pulse wave Doppler    Echocardiographic and Doppler Measurements    Ventricles    Right Ventricle:  Cavity size normal.  Hypertrophy not present.  Thrombus not present.  Global function mildly impaired.    Left Ventricle:  Cavity size normal.  Diastolic dimension 4.1 cm.  Hypertrophy not present.  Thrombus not present.  Global Function mildly impaired.  Ejection Fraction 50%.          Valves    Aortic Valve:  Annulus calcified.  Stenosis severe.  Area: 0.35 cm².  Mean Gradient: 67/105 mmHg.  Regurgitation trace.  Leaflets calcified, vegetative and thickened.  Leaflet motions restricted.      Mitral Valve:  Annulus normal.  Stenosis mild.  Area: 1.5 cm².  Mean Gradient: 4/9 mmHg.  Regurgitation trace.  Leaflets vegetative and thickened.  Leaflet motions restricted.      Tricuspid Valve:  Annulus normal.  Stenosis not present.  Regurgitation trace.  Leaflets normal.  Leaflet motions normal.    Pulmonic Valve:  Annulus normal.  Stenosis not present.  Regurgitation absent.          Aorta    Ascending Aorta:  Size normal.  Diameter 2.9 cm.  Dissection not present.  Plaque thickness less than 3 mm.  Mobile plaque not present.    Aortic Arch:  Size normal.  Dissection not present.  Plaque thickness less than 3 mm.  Mobile plaque not present.    Descending Aorta:  Size normal.  Dissection not present.  Plaque thickness less than 3 mm.  Mobile plaque not present.           Atria    Right Atrium:  Size normal.  Spontaneous echo contrast not present.  Thrombus not present.  Tumor not present.  Device present.    Left Atrium:  Size normal.  Spontaneous echo contrast not present.  Thrombus not present.  Tumor present.  Device not present.        Septa    Atrial Septum:  Intra-atrial septal morphology normal.          Diastolic Function Measurements:  Diastolic Dysfunction Grade= I  E= 132 ms  A= 109 ms  E/A Ratio= 1.2  DT=  ms  S/D=   IVRT=    Other Findings  Pericardium:  normal  Pleural Effusion:  none  Pulmonary Arteries:  normal  Pulmonary Venous Flow:  normal    Anesthesia Information  Performed Personally      Echocardiogram Comments:       Multiple intracardiac masses visualized, the largest appears to attach to the basal septum of the left ventricle, measuring 1.3x1.07cm. Another large mass appears to attach to the mid posterior wall of the LV, measuring 1.37x 0.7cm. Multiple other masses visualized including masses attached to mitral subvalvular apparatus, the aortic surface of the aortic valve, the atrial surface of the anterior mitral leaflet, and coumadin's ridge.   Anterior mitral leaflet is significantly thickened, resulting in mild stenosis, severity likely underestimated.  Severely thickened AV leaflets with the above mentioned masses attached to the ventricular surface, results in critical aortic stenosis.     Post CPB:   S/p removal of multiple ventricular masses, no further masses visualized on this exam.  S/p AVR 21mm bioprosthetic valve, well seated, no para or intravalvular leak. Mean gradient 7mmHg  S/p MVR 29mm bioprosthetic valve, well seated, no paravalvular leak, trace intravalvular MR, mean 4mmHg.  LV and RV function improved to normal on milrinone gtt.  No aortic dissection visulalized post decannulation

## 2021-05-03 NOTE — ANESTHESIA PROCEDURE NOTES
Airway  Urgency: elective    Date/Time: 5/3/2021 11:08 AM  Airway not difficult    General Information and Staff    Patient location during procedure: OR  Anesthesiologist: Nunu Torres MD    Indications and Patient Condition  Indications for airway management: airway protection    Preoxygenated: yes  MILS maintained throughout  Mask difficulty assessment: 1 - vent by mask    Final Airway Details  Final airway type: endotracheal airway      Successful airway: ETT  Cuffed: yes   Successful intubation technique: direct laryngoscopy  Blade: Gabriel  Blade size: 3  ETT size (mm): 7.5  Cormack-Lehane Classification: grade I - full view of glottis  Placement verified by: chest auscultation and capnometry   Cuff volume (mL): 3  Measured from: teeth  ETT/EBT  to teeth (cm): 21  Number of attempts at approach: 1  Assessment: lips, teeth, and gum same as pre-op and atraumatic intubation

## 2021-05-04 ENCOUNTER — APPOINTMENT (OUTPATIENT)
Dept: GENERAL RADIOLOGY | Facility: HOSPITAL | Age: 46
End: 2021-05-04

## 2021-05-04 LAB
ALBUMIN SERPL-MCNC: 4.3 G/DL (ref 3.5–5.2)
AMMONIA BLD-SCNC: 33 UMOL/L (ref 11–51)
ANION GAP SERPL CALCULATED.3IONS-SCNC: 9.7 MMOL/L (ref 5–15)
BASOPHILS # BLD AUTO: 0.04 10*3/MM3 (ref 0–0.2)
BASOPHILS NFR BLD AUTO: 0.3 % (ref 0–1.5)
BUN SERPL-MCNC: 20 MG/DL (ref 6–20)
BUN/CREAT SERPL: 18.3 (ref 7–25)
CA-I BLD-MCNC: 5.1 MG/DL (ref 4.6–5.4)
CA-I SERPL ISE-MCNC: 1.27 MMOL/L (ref 1.15–1.35)
CALCIUM SPEC-SCNC: 8.9 MG/DL (ref 8.6–10.5)
CHLORIDE SERPL-SCNC: 106 MMOL/L (ref 98–107)
CO2 SERPL-SCNC: 22.3 MMOL/L (ref 22–29)
CREAT SERPL-MCNC: 1.09 MG/DL (ref 0.57–1)
DEPRECATED RDW RBC AUTO: 41.1 FL (ref 37–54)
EOSINOPHIL # BLD AUTO: 0.18 10*3/MM3 (ref 0–0.4)
EOSINOPHIL NFR BLD AUTO: 1.5 % (ref 0.3–6.2)
ERYTHROCYTE [DISTWIDTH] IN BLOOD BY AUTOMATED COUNT: 12.8 % (ref 12.3–15.4)
GFR SERPL CREATININE-BSD FRML MDRD: 54 ML/MIN/1.73
GLUCOSE BLDC GLUCOMTR-MCNC: 116 MG/DL (ref 70–130)
GLUCOSE BLDC GLUCOMTR-MCNC: 117 MG/DL (ref 70–130)
GLUCOSE BLDC GLUCOMTR-MCNC: 138 MG/DL (ref 70–130)
GLUCOSE BLDC GLUCOMTR-MCNC: 197 MG/DL (ref 70–130)
GLUCOSE BLDC GLUCOMTR-MCNC: 200 MG/DL (ref 70–130)
GLUCOSE BLDC GLUCOMTR-MCNC: 203 MG/DL (ref 70–130)
GLUCOSE BLDC GLUCOMTR-MCNC: 92 MG/DL (ref 70–130)
GLUCOSE SERPL-MCNC: 123 MG/DL (ref 65–99)
HCT VFR BLD AUTO: 39.6 % (ref 34–46.6)
HGB BLD-MCNC: 13.4 G/DL (ref 12–15.9)
INR PPP: 1.33 (ref 0.9–1.1)
LYMPHOCYTES # BLD AUTO: 1.04 10*3/MM3 (ref 0.7–3.1)
LYMPHOCYTES NFR BLD AUTO: 8.7 % (ref 19.6–45.3)
MAGNESIUM SERPL-MCNC: 2.7 MG/DL (ref 1.6–2.6)
MCH RBC QN AUTO: 29.6 PG (ref 26.6–33)
MCHC RBC AUTO-ENTMCNC: 33.8 G/DL (ref 31.5–35.7)
MCV RBC AUTO: 87.6 FL (ref 79–97)
MONOCYTES # BLD AUTO: 0.83 10*3/MM3 (ref 0.1–0.9)
MONOCYTES NFR BLD AUTO: 6.9 % (ref 5–12)
NEUTROPHILS NFR BLD AUTO: 82 % (ref 42.7–76)
NEUTROPHILS NFR BLD AUTO: 9.86 10*3/MM3 (ref 1.7–7)
PHOSPHATE SERPL-MCNC: 5.6 MG/DL (ref 2.5–4.5)
PLATELET # BLD AUTO: 98 10*3/MM3 (ref 140–450)
PMV BLD AUTO: 10.3 FL (ref 6–12)
POTASSIUM SERPL-SCNC: 4.7 MMOL/L (ref 3.5–5.2)
PROTHROMBIN TIME: 16.3 SECONDS (ref 11.7–14.2)
QT INTERVAL: 485 MS
RBC # BLD AUTO: 4.52 10*6/MM3 (ref 3.77–5.28)
SODIUM SERPL-SCNC: 138 MMOL/L (ref 136–145)
WBC # BLD AUTO: 12.02 10*3/MM3 (ref 3.4–10.8)

## 2021-05-04 PROCEDURE — 71045 X-RAY EXAM CHEST 1 VIEW: CPT

## 2021-05-04 PROCEDURE — 85610 PROTHROMBIN TIME: CPT | Performed by: NURSE PRACTITIONER

## 2021-05-04 PROCEDURE — P9041 ALBUMIN (HUMAN),5%, 50ML: HCPCS | Performed by: NURSE PRACTITIONER

## 2021-05-04 PROCEDURE — 83735 ASSAY OF MAGNESIUM: CPT | Performed by: NURSE PRACTITIONER

## 2021-05-04 PROCEDURE — 82330 ASSAY OF CALCIUM: CPT | Performed by: NURSE PRACTITIONER

## 2021-05-04 PROCEDURE — 97161 PT EVAL LOW COMPLEX 20 MIN: CPT

## 2021-05-04 PROCEDURE — 82962 GLUCOSE BLOOD TEST: CPT

## 2021-05-04 PROCEDURE — 99231 SBSQ HOSP IP/OBS SF/LOW 25: CPT | Performed by: INTERNAL MEDICINE

## 2021-05-04 PROCEDURE — 25010000002 CALCIUM GLUCONATE-NACL 1-0.675 GM/50ML-% SOLUTION: Performed by: NURSE PRACTITIONER

## 2021-05-04 PROCEDURE — 25010000002 METOCLOPRAMIDE PER 10 MG: Performed by: NURSE PRACTITIONER

## 2021-05-04 PROCEDURE — 25010000002 ALBUMIN HUMAN 5% PER 50 ML: Performed by: NURSE PRACTITIONER

## 2021-05-04 PROCEDURE — 94799 UNLISTED PULMONARY SVC/PX: CPT

## 2021-05-04 PROCEDURE — 25010000002 FUROSEMIDE PER 20 MG: Performed by: NURSE PRACTITIONER

## 2021-05-04 PROCEDURE — 93010 ELECTROCARDIOGRAM REPORT: CPT | Performed by: INTERNAL MEDICINE

## 2021-05-04 PROCEDURE — 63710000001 INSULIN LISPRO (HUMAN) PER 5 UNITS: Performed by: NURSE PRACTITIONER

## 2021-05-04 PROCEDURE — 82140 ASSAY OF AMMONIA: CPT | Performed by: THORACIC SURGERY (CARDIOTHORACIC VASCULAR SURGERY)

## 2021-05-04 PROCEDURE — 99232 SBSQ HOSP IP/OBS MODERATE 35: CPT | Performed by: INTERNAL MEDICINE

## 2021-05-04 PROCEDURE — 25010000002 KETOROLAC TROMETHAMINE PER 15 MG: Performed by: NURSE PRACTITIONER

## 2021-05-04 PROCEDURE — 25010000003 MILRINONE LACTATE IN DEXTROSE 20-5 MG/100ML-% SOLUTION: Performed by: NURSE PRACTITIONER

## 2021-05-04 PROCEDURE — 94640 AIRWAY INHALATION TREATMENT: CPT

## 2021-05-04 PROCEDURE — 97110 THERAPEUTIC EXERCISES: CPT

## 2021-05-04 PROCEDURE — 80069 RENAL FUNCTION PANEL: CPT | Performed by: NURSE PRACTITIONER

## 2021-05-04 PROCEDURE — 25010000002 MAGNESIUM SULFATE IN D5W 1G/100ML (PREMIX) 1-5 GM/100ML-% SOLUTION: Performed by: NURSE PRACTITIONER

## 2021-05-04 PROCEDURE — 25010000002 VANCOMYCIN 10 G RECONSTITUTED SOLUTION: Performed by: NURSE PRACTITIONER

## 2021-05-04 PROCEDURE — 85025 COMPLETE CBC W/AUTO DIFF WBC: CPT | Performed by: NURSE PRACTITIONER

## 2021-05-04 PROCEDURE — 93005 ELECTROCARDIOGRAM TRACING: CPT | Performed by: NURSE PRACTITIONER

## 2021-05-04 RX ORDER — INSULIN LISPRO 100 [IU]/ML
0-14 INJECTION, SOLUTION INTRAVENOUS; SUBCUTANEOUS
Status: DISCONTINUED | OUTPATIENT
Start: 2021-05-04 | End: 2021-05-11 | Stop reason: HOSPADM

## 2021-05-04 RX ORDER — CALCIUM GLUCONATE 20 MG/ML
1 INJECTION, SOLUTION INTRAVENOUS ONCE
Status: COMPLETED | OUTPATIENT
Start: 2021-05-04 | End: 2021-05-04

## 2021-05-04 RX ORDER — KETOROLAC TROMETHAMINE 30 MG/ML
15 INJECTION, SOLUTION INTRAMUSCULAR; INTRAVENOUS EVERY 8 HOURS
Status: DISCONTINUED | OUTPATIENT
Start: 2021-05-04 | End: 2021-05-05

## 2021-05-04 RX ORDER — IPRATROPIUM BROMIDE AND ALBUTEROL SULFATE 2.5; .5 MG/3ML; MG/3ML
3 SOLUTION RESPIRATORY (INHALATION) EVERY 4 HOURS PRN
Status: DISCONTINUED | OUTPATIENT
Start: 2021-05-04 | End: 2021-05-11 | Stop reason: HOSPADM

## 2021-05-04 RX ORDER — KETOROLAC TROMETHAMINE 30 MG/ML
15 INJECTION, SOLUTION INTRAMUSCULAR; INTRAVENOUS EVERY 6 HOURS PRN
Status: DISCONTINUED | OUTPATIENT
Start: 2021-05-04 | End: 2021-05-04

## 2021-05-04 RX ORDER — NICOTINE POLACRILEX 4 MG
15 LOZENGE BUCCAL
Status: DISCONTINUED | OUTPATIENT
Start: 2021-05-04 | End: 2021-05-11 | Stop reason: HOSPADM

## 2021-05-04 RX ORDER — SODIUM CHLORIDE FOR INHALATION 7 %
4 VIAL, NEBULIZER (ML) INHALATION
Status: COMPLETED | OUTPATIENT
Start: 2021-05-04 | End: 2021-05-06

## 2021-05-04 RX ORDER — TRAMADOL HYDROCHLORIDE 50 MG/1
25 TABLET ORAL EVERY 6 HOURS PRN
Status: DISCONTINUED | OUTPATIENT
Start: 2021-05-04 | End: 2021-05-05

## 2021-05-04 RX ORDER — GUAIFENESIN 600 MG/1
1200 TABLET, EXTENDED RELEASE ORAL EVERY 12 HOURS SCHEDULED
Status: DISCONTINUED | OUTPATIENT
Start: 2021-05-04 | End: 2021-05-11 | Stop reason: HOSPADM

## 2021-05-04 RX ORDER — DEXTROSE MONOHYDRATE 25 G/50ML
25 INJECTION, SOLUTION INTRAVENOUS
Status: DISCONTINUED | OUTPATIENT
Start: 2021-05-04 | End: 2021-05-11 | Stop reason: HOSPADM

## 2021-05-04 RX ORDER — FUROSEMIDE 10 MG/ML
40 INJECTION INTRAMUSCULAR; INTRAVENOUS ONCE
Status: COMPLETED | OUTPATIENT
Start: 2021-05-04 | End: 2021-05-04

## 2021-05-04 RX ORDER — POTASSIUM CHLORIDE 750 MG/1
20 TABLET, FILM COATED, EXTENDED RELEASE ORAL ONCE
Status: DISCONTINUED | OUTPATIENT
Start: 2021-05-04 | End: 2021-05-05

## 2021-05-04 RX ADMIN — PANTOPRAZOLE SODIUM 40 MG: 40 TABLET, DELAYED RELEASE ORAL at 07:20

## 2021-05-04 RX ADMIN — OXYCODONE 5 MG: 5 TABLET ORAL at 00:31

## 2021-05-04 RX ADMIN — SODIUM CHLORIDE 4 ML: 7 NEBU SOLN,3 % NEBU at 19:37

## 2021-05-04 RX ADMIN — GUAIFENESIN 1200 MG: 600 TABLET, EXTENDED RELEASE ORAL at 20:29

## 2021-05-04 RX ADMIN — ALBUMIN HUMAN 250 ML: 0.05 INJECTION, SOLUTION INTRAVENOUS at 04:11

## 2021-05-04 RX ADMIN — INSULIN LISPRO 3 UNITS: 100 INJECTION, SOLUTION INTRAVENOUS; SUBCUTANEOUS at 20:40

## 2021-05-04 RX ADMIN — KETOROLAC TROMETHAMINE 15 MG: 30 INJECTION, SOLUTION INTRAMUSCULAR at 17:33

## 2021-05-04 RX ADMIN — MAGNESIUM SULFATE HEPTAHYDRATE 1 G: 1 INJECTION, SOLUTION INTRAVENOUS at 00:31

## 2021-05-04 RX ADMIN — DOCUSATE SODIUM 50MG AND SENNOSIDES 8.6MG 2 TABLET: 8.6; 5 TABLET, FILM COATED ORAL at 20:29

## 2021-05-04 RX ADMIN — IPRATROPIUM BROMIDE AND ALBUTEROL SULFATE 3 ML: 2.5; .5 SOLUTION RESPIRATORY (INHALATION) at 15:17

## 2021-05-04 RX ADMIN — SODIUM CHLORIDE 30 ML/HR: 9 INJECTION, SOLUTION INTRAVENOUS at 15:57

## 2021-05-04 RX ADMIN — METOPROLOL TARTRATE 12.5 MG: 25 TABLET, FILM COATED ORAL at 20:30

## 2021-05-04 RX ADMIN — CALCIUM GLUCONATE 1 G: 20 INJECTION, SOLUTION INTRAVENOUS at 08:59

## 2021-05-04 RX ADMIN — HYDROCODONE BITARTRATE AND ACETAMINOPHEN 1 TABLET: 5; 325 TABLET ORAL at 07:51

## 2021-05-04 RX ADMIN — CHLORHEXIDINE GLUCONATE 15 ML: 1.2 RINSE ORAL at 08:59

## 2021-05-04 RX ADMIN — METOCLOPRAMIDE HYDROCHLORIDE 10 MG: 5 INJECTION INTRAMUSCULAR; INTRAVENOUS at 05:04

## 2021-05-04 RX ADMIN — ASPIRIN 81 MG: 81 TABLET, COATED ORAL at 10:12

## 2021-05-04 RX ADMIN — MAGNESIUM SULFATE HEPTAHYDRATE 1 G: 1 INJECTION, SOLUTION INTRAVENOUS at 08:10

## 2021-05-04 RX ADMIN — VANCOMYCIN HYDROCHLORIDE 2000 MG: 10 INJECTION, POWDER, LYOPHILIZED, FOR SOLUTION INTRAVENOUS at 22:23

## 2021-05-04 RX ADMIN — MUPIROCIN 1 APPLICATION: 20 OINTMENT TOPICAL at 08:59

## 2021-05-04 RX ADMIN — CALCIUM GLUCONATE 1 G: 20 INJECTION, SOLUTION INTRAVENOUS at 15:39

## 2021-05-04 RX ADMIN — ACETAMINOPHEN 650 MG: 325 TABLET, FILM COATED ORAL at 00:31

## 2021-05-04 RX ADMIN — MILRINONE LACTATE IN DEXTROSE 0.12 MCG/KG/MIN: 200 INJECTION, SOLUTION INTRAVENOUS at 08:47

## 2021-05-04 RX ADMIN — INSULIN LISPRO 5 UNITS: 100 INJECTION, SOLUTION INTRAVENOUS; SUBCUTANEOUS at 17:32

## 2021-05-04 RX ADMIN — FUROSEMIDE 40 MG: 10 INJECTION, SOLUTION INTRAMUSCULAR; INTRAVENOUS at 08:25

## 2021-05-04 RX ADMIN — VANCOMYCIN HYDROCHLORIDE 2000 MG: 10 INJECTION, POWDER, LYOPHILIZED, FOR SOLUTION INTRAVENOUS at 11:37

## 2021-05-04 RX ADMIN — HYDROCODONE BITARTRATE AND ACETAMINOPHEN 1 TABLET: 5; 325 TABLET ORAL at 15:39

## 2021-05-04 RX ADMIN — ATORVASTATIN CALCIUM 40 MG: 20 TABLET, FILM COATED ORAL at 20:30

## 2021-05-04 RX ADMIN — CHLORHEXIDINE GLUCONATE 15 ML: 1.2 RINSE ORAL at 20:29

## 2021-05-04 RX ADMIN — MUPIROCIN 1 APPLICATION: 20 OINTMENT TOPICAL at 20:29

## 2021-05-04 RX ADMIN — POLYETHYLENE GLYCOL 3350 17 G: 17 POWDER, FOR SOLUTION ORAL at 20:30

## 2021-05-04 RX ADMIN — METOCLOPRAMIDE HYDROCHLORIDE 10 MG: 5 INJECTION INTRAMUSCULAR; INTRAVENOUS at 11:37

## 2021-05-04 RX ADMIN — SODIUM CHLORIDE 4 ML: 7 NEBU SOLN,3 % NEBU at 15:24

## 2021-05-04 RX ADMIN — GUAIFENESIN 1200 MG: 600 TABLET, EXTENDED RELEASE ORAL at 08:59

## 2021-05-04 RX ADMIN — INSULIN LISPRO 5 UNITS: 100 INJECTION, SOLUTION INTRAVENOUS; SUBCUTANEOUS at 11:49

## 2021-05-04 RX ADMIN — IPRATROPIUM BROMIDE AND ALBUTEROL SULFATE 3 ML: 2.5; .5 SOLUTION RESPIRATORY (INHALATION) at 19:36

## 2021-05-04 NOTE — ANESTHESIA POSTPROCEDURE EVALUATION
"Patient: Colette Veronica    Procedure Summary     Date: 05/03/21 Room / Location: [facility] OR [address] [place] [address] MAIN OR    Anesthesia Start: 1050 Anesthesia Stop: 1536    Procedure: JACKELYN STERNOTOMY AORTIC VALVE REPLACEMENT, MITRAL VALVE REPLACEMENT, EXCISION OF BENIGN PAPILLARY FIBROELASTOMAS  AND PRP (N/A Chest) Diagnosis:       Nonrheumatic aortic valve stenosis      (Nonrheumatic aortic valve stenosis [I35.0])    Surgeons: Jr Daniel Noguera MD Provider: Nunu Torres MD    Anesthesia Type: general ASA Status: 4          Anesthesia Type: general    Vitals  Vitals Value Taken Time   BP 94/58 05/04/21 0845   Temp 36.7 °C (98.1 °F) 05/04/21 0805   Pulse 80 05/04/21 0900   Resp 16 05/04/21 0815   SpO2 95 % 05/04/21 0900   Vitals shown include unvalidated device data.        Post Anesthesia Care and Evaluation    Pain management: adequate  Airway patency: patent  Anesthetic complications: No anesthetic complications  PONV Status: controlled  Cardiovascular status: acceptable  Respiratory status: acceptable  Hydration status: acceptable    Comments: BP 94/58   Pulse 80   Temp 36.7 °C (98.1 °F)   Resp 16   Ht 162.6 cm (64.02\")   Wt 127 kg (279 lb 12.8 oz)   LMP 03/22/2021 (Approximate)   SpO2 91%   BMI 48.00 kg/m²         "

## 2021-05-05 ENCOUNTER — APPOINTMENT (OUTPATIENT)
Dept: GENERAL RADIOLOGY | Facility: HOSPITAL | Age: 46
End: 2021-05-05

## 2021-05-05 LAB
ALBUMIN SERPL-MCNC: 3.9 G/DL (ref 3.5–5.2)
ALBUMIN/GLOB SERPL: 1.9 G/DL
ALP SERPL-CCNC: 63 U/L (ref 39–117)
ALT SERPL W P-5'-P-CCNC: 13 U/L (ref 1–33)
ANION GAP SERPL CALCULATED.3IONS-SCNC: 14.4 MMOL/L (ref 5–15)
AST SERPL-CCNC: 38 U/L (ref 1–32)
BH BB BLOOD EXPIRATION DATE: NORMAL
BH BB BLOOD EXPIRATION DATE: NORMAL
BH BB BLOOD TYPE BARCODE: 6200
BH BB BLOOD TYPE BARCODE: 6200
BH BB DISPENSE STATUS: NORMAL
BH BB DISPENSE STATUS: NORMAL
BH BB PRODUCT CODE: NORMAL
BH BB PRODUCT CODE: NORMAL
BH BB UNIT NUMBER: NORMAL
BH BB UNIT NUMBER: NORMAL
BILIRUB SERPL-MCNC: 0.9 MG/DL (ref 0–1.2)
BUN SERPL-MCNC: 38 MG/DL (ref 6–20)
BUN/CREAT SERPL: 20.5 (ref 7–25)
CALCIUM SPEC-SCNC: 8.4 MG/DL (ref 8.6–10.5)
CHLORIDE SERPL-SCNC: 99 MMOL/L (ref 98–107)
CO2 SERPL-SCNC: 18.6 MMOL/L (ref 22–29)
CREAT SERPL-MCNC: 1.85 MG/DL (ref 0.57–1)
CROSSMATCH INTERPRETATION: NORMAL
CROSSMATCH INTERPRETATION: NORMAL
CYTO UR: NORMAL
DEPRECATED RDW RBC AUTO: 41.9 FL (ref 37–54)
ERYTHROCYTE [DISTWIDTH] IN BLOOD BY AUTOMATED COUNT: 12.6 % (ref 12.3–15.4)
GFR SERPL CREATININE-BSD FRML MDRD: 29 ML/MIN/1.73
GLOBULIN UR ELPH-MCNC: 2.1 GM/DL
GLUCOSE BLDC GLUCOMTR-MCNC: 174 MG/DL (ref 70–130)
GLUCOSE BLDC GLUCOMTR-MCNC: 218 MG/DL (ref 70–130)
GLUCOSE BLDC GLUCOMTR-MCNC: 226 MG/DL (ref 70–130)
GLUCOSE BLDC GLUCOMTR-MCNC: 245 MG/DL (ref 70–130)
GLUCOSE SERPL-MCNC: 179 MG/DL (ref 65–99)
HCT VFR BLD AUTO: 37.8 % (ref 34–46.6)
HGB BLD-MCNC: 12.4 G/DL (ref 12–15.9)
INR PPP: 1.31 (ref 0.9–1.1)
LAB AP CASE REPORT: NORMAL
MCH RBC QN AUTO: 29.5 PG (ref 26.6–33)
MCHC RBC AUTO-ENTMCNC: 32.8 G/DL (ref 31.5–35.7)
MCV RBC AUTO: 90 FL (ref 79–97)
PATH REPORT.FINAL DX SPEC: NORMAL
PATH REPORT.GROSS SPEC: NORMAL
PLATELET # BLD AUTO: 78 10*3/MM3 (ref 140–450)
PMV BLD AUTO: 10.5 FL (ref 6–12)
POTASSIUM SERPL-SCNC: 5.2 MMOL/L (ref 3.5–5.2)
PROT SERPL-MCNC: 6 G/DL (ref 6–8.5)
PROTHROMBIN TIME: 16.1 SECONDS (ref 11.7–14.2)
QT INTERVAL: 482 MS
RBC # BLD AUTO: 4.2 10*6/MM3 (ref 3.77–5.28)
SODIUM SERPL-SCNC: 132 MMOL/L (ref 136–145)
UNIT  ABO: NORMAL
UNIT  ABO: NORMAL
UNIT  RH: NORMAL
UNIT  RH: NORMAL
WBC # BLD AUTO: 14.9 10*3/MM3 (ref 3.4–10.8)

## 2021-05-05 PROCEDURE — 94799 UNLISTED PULMONARY SVC/PX: CPT

## 2021-05-05 PROCEDURE — 93010 ELECTROCARDIOGRAM REPORT: CPT | Performed by: INTERNAL MEDICINE

## 2021-05-05 PROCEDURE — 82962 GLUCOSE BLOOD TEST: CPT

## 2021-05-05 PROCEDURE — 97110 THERAPEUTIC EXERCISES: CPT

## 2021-05-05 PROCEDURE — 63710000001 INSULIN LISPRO (HUMAN) PER 5 UNITS: Performed by: NURSE PRACTITIONER

## 2021-05-05 PROCEDURE — 93005 ELECTROCARDIOGRAM TRACING: CPT | Performed by: NURSE PRACTITIONER

## 2021-05-05 PROCEDURE — 85610 PROTHROMBIN TIME: CPT | Performed by: INTERNAL MEDICINE

## 2021-05-05 PROCEDURE — 71045 X-RAY EXAM CHEST 1 VIEW: CPT

## 2021-05-05 PROCEDURE — 99231 SBSQ HOSP IP/OBS SF/LOW 25: CPT | Performed by: INTERNAL MEDICINE

## 2021-05-05 PROCEDURE — 85027 COMPLETE CBC AUTOMATED: CPT | Performed by: NURSE PRACTITIONER

## 2021-05-05 PROCEDURE — 25010000002 CALCIUM GLUCONATE-NACL 1-0.675 GM/50ML-% SOLUTION: Performed by: NURSE PRACTITIONER

## 2021-05-05 PROCEDURE — 80053 COMPREHEN METABOLIC PANEL: CPT | Performed by: INTERNAL MEDICINE

## 2021-05-05 PROCEDURE — 99232 SBSQ HOSP IP/OBS MODERATE 35: CPT | Performed by: INTERNAL MEDICINE

## 2021-05-05 RX ORDER — POTASSIUM CHLORIDE 750 MG/1
20 TABLET, FILM COATED, EXTENDED RELEASE ORAL ONCE
Status: COMPLETED | OUTPATIENT
Start: 2021-05-05 | End: 2021-05-05

## 2021-05-05 RX ORDER — POTASSIUM CHLORIDE 750 MG/1
20 TABLET, FILM COATED, EXTENDED RELEASE ORAL ONCE
Status: DISCONTINUED | OUTPATIENT
Start: 2021-05-05 | End: 2021-05-05

## 2021-05-05 RX ORDER — BUMETANIDE 0.25 MG/ML
2 INJECTION INTRAMUSCULAR; INTRAVENOUS ONCE
Status: DISCONTINUED | OUTPATIENT
Start: 2021-05-05 | End: 2021-05-05

## 2021-05-05 RX ORDER — CYCLOBENZAPRINE HCL 10 MG
5 TABLET ORAL EVERY 8 HOURS PRN
Status: DISCONTINUED | OUTPATIENT
Start: 2021-05-05 | End: 2021-05-11 | Stop reason: HOSPADM

## 2021-05-05 RX ORDER — CALCIUM GLUCONATE 20 MG/ML
2 INJECTION, SOLUTION INTRAVENOUS ONCE
Status: COMPLETED | OUTPATIENT
Start: 2021-05-05 | End: 2021-05-05

## 2021-05-05 RX ORDER — BUMETANIDE 0.25 MG/ML
2 INJECTION INTRAMUSCULAR; INTRAVENOUS ONCE
Status: COMPLETED | OUTPATIENT
Start: 2021-05-05 | End: 2021-05-05

## 2021-05-05 RX ORDER — TRAMADOL HYDROCHLORIDE 50 MG/1
50 TABLET ORAL EVERY 6 HOURS PRN
Status: DISCONTINUED | OUTPATIENT
Start: 2021-05-05 | End: 2021-05-11 | Stop reason: HOSPADM

## 2021-05-05 RX ADMIN — BUMETANIDE 2 MG: 0.25 INJECTION INTRAMUSCULAR; INTRAVENOUS at 17:10

## 2021-05-05 RX ADMIN — MUPIROCIN 1 APPLICATION: 20 OINTMENT TOPICAL at 21:03

## 2021-05-05 RX ADMIN — INSULIN LISPRO 5 UNITS: 100 INJECTION, SOLUTION INTRAVENOUS; SUBCUTANEOUS at 21:15

## 2021-05-05 RX ADMIN — METOPROLOL TARTRATE 12.5 MG: 25 TABLET, FILM COATED ORAL at 08:25

## 2021-05-05 RX ADMIN — CHLORHEXIDINE GLUCONATE 15 ML: 1.2 RINSE ORAL at 21:06

## 2021-05-05 RX ADMIN — CYCLOBENZAPRINE 5 MG: 10 TABLET, FILM COATED ORAL at 15:03

## 2021-05-05 RX ADMIN — HYDROCODONE BITARTRATE AND ACETAMINOPHEN 2 TABLET: 5; 325 TABLET ORAL at 21:03

## 2021-05-05 RX ADMIN — CHLORHEXIDINE GLUCONATE 15 ML: 1.2 RINSE ORAL at 08:25

## 2021-05-05 RX ADMIN — GUAIFENESIN 1200 MG: 600 TABLET, EXTENDED RELEASE ORAL at 21:03

## 2021-05-05 RX ADMIN — POTASSIUM CHLORIDE 20 MEQ: 750 TABLET, EXTENDED RELEASE ORAL at 17:06

## 2021-05-05 RX ADMIN — INSULIN LISPRO 5 UNITS: 100 INJECTION, SOLUTION INTRAVENOUS; SUBCUTANEOUS at 17:25

## 2021-05-05 RX ADMIN — IPRATROPIUM BROMIDE AND ALBUTEROL SULFATE 3 ML: 2.5; .5 SOLUTION RESPIRATORY (INHALATION) at 20:07

## 2021-05-05 RX ADMIN — SODIUM CHLORIDE 4 ML: 7 NEBU SOLN,3 % NEBU at 20:07

## 2021-05-05 RX ADMIN — METOPROLOL TARTRATE 12.5 MG: 25 TABLET, FILM COATED ORAL at 21:04

## 2021-05-05 RX ADMIN — SODIUM CHLORIDE 4 ML: 7 NEBU SOLN,3 % NEBU at 08:52

## 2021-05-05 RX ADMIN — ATORVASTATIN CALCIUM 40 MG: 20 TABLET, FILM COATED ORAL at 21:03

## 2021-05-05 RX ADMIN — HYDROCODONE BITARTRATE AND ACETAMINOPHEN 2 TABLET: 5; 325 TABLET ORAL at 05:46

## 2021-05-05 RX ADMIN — DOCUSATE SODIUM 50MG AND SENNOSIDES 8.6MG 2 TABLET: 8.6; 5 TABLET, FILM COATED ORAL at 21:03

## 2021-05-05 RX ADMIN — TRAMADOL HYDROCHLORIDE 50 MG: 50 TABLET, FILM COATED ORAL at 17:25

## 2021-05-05 RX ADMIN — IPRATROPIUM BROMIDE AND ALBUTEROL SULFATE 3 ML: 2.5; .5 SOLUTION RESPIRATORY (INHALATION) at 08:48

## 2021-05-05 RX ADMIN — GUAIFENESIN 1200 MG: 600 TABLET, EXTENDED RELEASE ORAL at 08:25

## 2021-05-05 RX ADMIN — CALCIUM GLUCONATE 2 G: 20 INJECTION, SOLUTION INTRAVENOUS at 11:37

## 2021-05-05 RX ADMIN — INSULIN LISPRO 5 UNITS: 100 INJECTION, SOLUTION INTRAVENOUS; SUBCUTANEOUS at 11:55

## 2021-05-05 RX ADMIN — MUPIROCIN 1 APPLICATION: 20 OINTMENT TOPICAL at 08:25

## 2021-05-05 RX ADMIN — IPRATROPIUM BROMIDE AND ALBUTEROL SULFATE 3 ML: 2.5; .5 SOLUTION RESPIRATORY (INHALATION) at 13:17

## 2021-05-05 RX ADMIN — PANTOPRAZOLE SODIUM 40 MG: 40 TABLET, DELAYED RELEASE ORAL at 05:46

## 2021-05-06 ENCOUNTER — APPOINTMENT (OUTPATIENT)
Dept: GENERAL RADIOLOGY | Facility: HOSPITAL | Age: 46
End: 2021-05-06

## 2021-05-06 LAB
ALBUMIN SERPL-MCNC: 3.8 G/DL (ref 3.5–5.2)
ALBUMIN/GLOB SERPL: 1.4 G/DL
ALP SERPL-CCNC: 74 U/L (ref 39–117)
ALT SERPL W P-5'-P-CCNC: 12 U/L (ref 1–33)
ANION GAP SERPL CALCULATED.3IONS-SCNC: 14.3 MMOL/L (ref 5–15)
ANION GAP SERPL CALCULATED.3IONS-SCNC: 16.1 MMOL/L (ref 5–15)
AST SERPL-CCNC: 22 U/L (ref 1–32)
BILIRUB SERPL-MCNC: 0.7 MG/DL (ref 0–1.2)
BUN SERPL-MCNC: 54 MG/DL (ref 6–20)
BUN SERPL-MCNC: 59 MG/DL (ref 6–20)
BUN/CREAT SERPL: 26.9 (ref 7–25)
BUN/CREAT SERPL: 28.1 (ref 7–25)
CALCIUM SPEC-SCNC: 8.6 MG/DL (ref 8.6–10.5)
CALCIUM SPEC-SCNC: 8.8 MG/DL (ref 8.6–10.5)
CHLORIDE SERPL-SCNC: 96 MMOL/L (ref 98–107)
CHLORIDE SERPL-SCNC: 97 MMOL/L (ref 98–107)
CO2 SERPL-SCNC: 16.7 MMOL/L (ref 22–29)
CO2 SERPL-SCNC: 17.9 MMOL/L (ref 22–29)
CREAT SERPL-MCNC: 2.01 MG/DL (ref 0.57–1)
CREAT SERPL-MCNC: 2.1 MG/DL (ref 0.57–1)
CREAT UR-MCNC: 125.1 MG/DL
DEPRECATED RDW RBC AUTO: 40.6 FL (ref 37–54)
ERYTHROCYTE [DISTWIDTH] IN BLOOD BY AUTOMATED COUNT: 12.6 % (ref 12.3–15.4)
GFR SERPL CREATININE-BSD FRML MDRD: 25 ML/MIN/1.73
GFR SERPL CREATININE-BSD FRML MDRD: 26 ML/MIN/1.73
GLOBULIN UR ELPH-MCNC: 2.8 GM/DL
GLUCOSE BLDC GLUCOMTR-MCNC: 164 MG/DL (ref 70–130)
GLUCOSE BLDC GLUCOMTR-MCNC: 199 MG/DL (ref 70–130)
GLUCOSE BLDC GLUCOMTR-MCNC: 205 MG/DL (ref 70–130)
GLUCOSE BLDC GLUCOMTR-MCNC: 216 MG/DL (ref 70–130)
GLUCOSE BLDC GLUCOMTR-MCNC: 218 MG/DL (ref 70–130)
GLUCOSE SERPL-MCNC: 167 MG/DL (ref 65–99)
GLUCOSE SERPL-MCNC: 208 MG/DL (ref 65–99)
HCT VFR BLD AUTO: 39 % (ref 34–46.6)
HGB BLD-MCNC: 13 G/DL (ref 12–15.9)
MCH RBC QN AUTO: 29.5 PG (ref 26.6–33)
MCHC RBC AUTO-ENTMCNC: 33.3 G/DL (ref 31.5–35.7)
MCV RBC AUTO: 88.4 FL (ref 79–97)
PLATELET # BLD AUTO: 97 10*3/MM3 (ref 140–450)
PMV BLD AUTO: 10.9 FL (ref 6–12)
POTASSIUM SERPL-SCNC: 4.5 MMOL/L (ref 3.5–5.2)
POTASSIUM SERPL-SCNC: 4.7 MMOL/L (ref 3.5–5.2)
POTASSIUM SERPL-SCNC: 5.3 MMOL/L (ref 3.5–5.2)
PROT SERPL-MCNC: 6.6 G/DL (ref 6–8.5)
RBC # BLD AUTO: 4.41 10*6/MM3 (ref 3.77–5.28)
SODIUM SERPL-SCNC: 128 MMOL/L (ref 136–145)
SODIUM SERPL-SCNC: 130 MMOL/L (ref 136–145)
SODIUM UR-SCNC: <20 MMOL/L
WBC # BLD AUTO: 13.6 10*3/MM3 (ref 3.4–10.8)

## 2021-05-06 PROCEDURE — 99232 SBSQ HOSP IP/OBS MODERATE 35: CPT | Performed by: INTERNAL MEDICINE

## 2021-05-06 PROCEDURE — 71046 X-RAY EXAM CHEST 2 VIEWS: CPT

## 2021-05-06 PROCEDURE — 85027 COMPLETE CBC AUTOMATED: CPT | Performed by: NURSE PRACTITIONER

## 2021-05-06 PROCEDURE — 82570 ASSAY OF URINE CREATININE: CPT | Performed by: INTERNAL MEDICINE

## 2021-05-06 PROCEDURE — 80053 COMPREHEN METABOLIC PANEL: CPT | Performed by: NURSE PRACTITIONER

## 2021-05-06 PROCEDURE — 94799 UNLISTED PULMONARY SVC/PX: CPT

## 2021-05-06 PROCEDURE — 97530 THERAPEUTIC ACTIVITIES: CPT

## 2021-05-06 PROCEDURE — 82962 GLUCOSE BLOOD TEST: CPT

## 2021-05-06 PROCEDURE — 63710000001 INSULIN LISPRO (HUMAN) PER 5 UNITS: Performed by: NURSE PRACTITIONER

## 2021-05-06 PROCEDURE — 84300 ASSAY OF URINE SODIUM: CPT | Performed by: INTERNAL MEDICINE

## 2021-05-06 PROCEDURE — 84132 ASSAY OF SERUM POTASSIUM: CPT | Performed by: INTERNAL MEDICINE

## 2021-05-06 RX ORDER — SODIUM CHLORIDE 9 MG/ML
75 INJECTION, SOLUTION INTRAVENOUS CONTINUOUS
Status: DISCONTINUED | OUTPATIENT
Start: 2021-05-06 | End: 2021-05-07

## 2021-05-06 RX ADMIN — HYDROCODONE BITARTRATE AND ACETAMINOPHEN 1 TABLET: 5; 325 TABLET ORAL at 09:59

## 2021-05-06 RX ADMIN — INSULIN LISPRO 3 UNITS: 100 INJECTION, SOLUTION INTRAVENOUS; SUBCUTANEOUS at 09:47

## 2021-05-06 RX ADMIN — IPRATROPIUM BROMIDE AND ALBUTEROL SULFATE 3 ML: 2.5; .5 SOLUTION RESPIRATORY (INHALATION) at 21:12

## 2021-05-06 RX ADMIN — DOCUSATE SODIUM 50MG AND SENNOSIDES 8.6MG 2 TABLET: 8.6; 5 TABLET, FILM COATED ORAL at 21:07

## 2021-05-06 RX ADMIN — HYDROCODONE BITARTRATE AND ACETAMINOPHEN 2 TABLET: 5; 325 TABLET ORAL at 21:09

## 2021-05-06 RX ADMIN — GUAIFENESIN 1200 MG: 600 TABLET, EXTENDED RELEASE ORAL at 21:07

## 2021-05-06 RX ADMIN — POLYETHYLENE GLYCOL 3350 17 G: 17 POWDER, FOR SOLUTION ORAL at 09:48

## 2021-05-06 RX ADMIN — GUAIFENESIN 1200 MG: 600 TABLET, EXTENDED RELEASE ORAL at 09:48

## 2021-05-06 RX ADMIN — INSULIN LISPRO 5 UNITS: 100 INJECTION, SOLUTION INTRAVENOUS; SUBCUTANEOUS at 21:06

## 2021-05-06 RX ADMIN — PANTOPRAZOLE SODIUM 40 MG: 40 TABLET, DELAYED RELEASE ORAL at 06:47

## 2021-05-06 RX ADMIN — INSULIN LISPRO 5 UNITS: 100 INJECTION, SOLUTION INTRAVENOUS; SUBCUTANEOUS at 17:08

## 2021-05-06 RX ADMIN — ASPIRIN 81 MG: 81 TABLET, COATED ORAL at 09:48

## 2021-05-06 RX ADMIN — SODIUM CHLORIDE 4 ML: 7 NEBU SOLN,3 % NEBU at 08:19

## 2021-05-06 RX ADMIN — IPRATROPIUM BROMIDE AND ALBUTEROL SULFATE 3 ML: 2.5; .5 SOLUTION RESPIRATORY (INHALATION) at 08:12

## 2021-05-06 RX ADMIN — INSULIN LISPRO 5 UNITS: 100 INJECTION, SOLUTION INTRAVENOUS; SUBCUTANEOUS at 12:57

## 2021-05-06 RX ADMIN — SODIUM CHLORIDE, PRESERVATIVE FREE 10 ML: 5 INJECTION INTRAVENOUS at 09:49

## 2021-05-06 RX ADMIN — SODIUM CHLORIDE 250 ML: 9 INJECTION, SOLUTION INTRAVENOUS at 12:14

## 2021-05-06 RX ADMIN — SODIUM CHLORIDE 4 ML: 7 NEBU SOLN,3 % NEBU at 21:12

## 2021-05-07 ENCOUNTER — APPOINTMENT (OUTPATIENT)
Dept: GENERAL RADIOLOGY | Facility: HOSPITAL | Age: 46
End: 2021-05-07

## 2021-05-07 LAB
ANION GAP SERPL CALCULATED.3IONS-SCNC: 11.7 MMOL/L (ref 5–15)
BUN SERPL-MCNC: 66 MG/DL (ref 6–20)
BUN/CREAT SERPL: 41.8 (ref 7–25)
CALCIUM SPEC-SCNC: 8.3 MG/DL (ref 8.6–10.5)
CHLORIDE SERPL-SCNC: 100 MMOL/L (ref 98–107)
CO2 SERPL-SCNC: 18.3 MMOL/L (ref 22–29)
CREAT SERPL-MCNC: 1.58 MG/DL (ref 0.57–1)
DEPRECATED RDW RBC AUTO: 40.7 FL (ref 37–54)
ERYTHROCYTE [DISTWIDTH] IN BLOOD BY AUTOMATED COUNT: 12.8 % (ref 12.3–15.4)
GFR SERPL CREATININE-BSD FRML MDRD: 35 ML/MIN/1.73
GLUCOSE BLDC GLUCOMTR-MCNC: 148 MG/DL (ref 70–130)
GLUCOSE BLDC GLUCOMTR-MCNC: 187 MG/DL (ref 70–130)
GLUCOSE BLDC GLUCOMTR-MCNC: 198 MG/DL (ref 70–130)
GLUCOSE BLDC GLUCOMTR-MCNC: 199 MG/DL (ref 70–130)
GLUCOSE BLDC GLUCOMTR-MCNC: 216 MG/DL (ref 70–130)
GLUCOSE SERPL-MCNC: 153 MG/DL (ref 65–99)
HCT VFR BLD AUTO: 36.3 % (ref 34–46.6)
HGB BLD-MCNC: 11.9 G/DL (ref 12–15.9)
MCH RBC QN AUTO: 28.5 PG (ref 26.6–33)
MCHC RBC AUTO-ENTMCNC: 32.8 G/DL (ref 31.5–35.7)
MCV RBC AUTO: 87.1 FL (ref 79–97)
PLATELET # BLD AUTO: 114 10*3/MM3 (ref 140–450)
PMV BLD AUTO: 10.2 FL (ref 6–12)
POTASSIUM SERPL-SCNC: 4.2 MMOL/L (ref 3.5–5.2)
RBC # BLD AUTO: 4.17 10*6/MM3 (ref 3.77–5.28)
SODIUM SERPL-SCNC: 130 MMOL/L (ref 136–145)
URATE SERPL-MCNC: 8.3 MG/DL (ref 2.4–5.7)
WBC # BLD AUTO: 9.67 10*3/MM3 (ref 3.4–10.8)

## 2021-05-07 PROCEDURE — 63710000001 INSULIN LISPRO (HUMAN) PER 5 UNITS: Performed by: NURSE PRACTITIONER

## 2021-05-07 PROCEDURE — 97530 THERAPEUTIC ACTIVITIES: CPT

## 2021-05-07 PROCEDURE — 94799 UNLISTED PULMONARY SVC/PX: CPT

## 2021-05-07 PROCEDURE — 82962 GLUCOSE BLOOD TEST: CPT

## 2021-05-07 PROCEDURE — 99232 SBSQ HOSP IP/OBS MODERATE 35: CPT | Performed by: INTERNAL MEDICINE

## 2021-05-07 PROCEDURE — 84550 ASSAY OF BLOOD/URIC ACID: CPT | Performed by: INTERNAL MEDICINE

## 2021-05-07 PROCEDURE — 25010000002 ENOXAPARIN PER 10 MG: Performed by: NURSE PRACTITIONER

## 2021-05-07 PROCEDURE — 71046 X-RAY EXAM CHEST 2 VIEWS: CPT

## 2021-05-07 PROCEDURE — 80048 BASIC METABOLIC PNL TOTAL CA: CPT | Performed by: NURSE PRACTITIONER

## 2021-05-07 PROCEDURE — 85027 COMPLETE CBC AUTOMATED: CPT | Performed by: NURSE PRACTITIONER

## 2021-05-07 RX ORDER — WARFARIN SODIUM 2 MG/1
2 TABLET ORAL
Status: DISCONTINUED | OUTPATIENT
Start: 2021-05-08 | End: 2021-05-09

## 2021-05-07 RX ADMIN — HYDROCODONE BITARTRATE AND ACETAMINOPHEN 2 TABLET: 5; 325 TABLET ORAL at 22:35

## 2021-05-07 RX ADMIN — HYDROCODONE BITARTRATE AND ACETAMINOPHEN 2 TABLET: 5; 325 TABLET ORAL at 08:50

## 2021-05-07 RX ADMIN — GUAIFENESIN 1200 MG: 600 TABLET, EXTENDED RELEASE ORAL at 20:13

## 2021-05-07 RX ADMIN — MUPIROCIN 1 APPLICATION: 20 OINTMENT TOPICAL at 08:50

## 2021-05-07 RX ADMIN — PANTOPRAZOLE SODIUM 40 MG: 40 TABLET, DELAYED RELEASE ORAL at 06:15

## 2021-05-07 RX ADMIN — METOPROLOL TARTRATE 12.5 MG: 25 TABLET, FILM COATED ORAL at 16:57

## 2021-05-07 RX ADMIN — INSULIN LISPRO 3 UNITS: 100 INJECTION, SOLUTION INTRAVENOUS; SUBCUTANEOUS at 20:14

## 2021-05-07 RX ADMIN — IPRATROPIUM BROMIDE AND ALBUTEROL SULFATE 3 ML: 2.5; .5 SOLUTION RESPIRATORY (INHALATION) at 09:20

## 2021-05-07 RX ADMIN — INSULIN LISPRO 3 UNITS: 100 INJECTION, SOLUTION INTRAVENOUS; SUBCUTANEOUS at 12:23

## 2021-05-07 RX ADMIN — ASPIRIN 81 MG: 81 TABLET, COATED ORAL at 08:50

## 2021-05-07 RX ADMIN — ENOXAPARIN SODIUM 40 MG: 40 INJECTION SUBCUTANEOUS at 16:58

## 2021-05-07 RX ADMIN — POLYETHYLENE GLYCOL 3350 17 G: 17 POWDER, FOR SOLUTION ORAL at 08:50

## 2021-05-07 RX ADMIN — GUAIFENESIN 1200 MG: 600 TABLET, EXTENDED RELEASE ORAL at 08:50

## 2021-05-07 RX ADMIN — INSULIN LISPRO 3 UNITS: 100 INJECTION, SOLUTION INTRAVENOUS; SUBCUTANEOUS at 16:57

## 2021-05-07 RX ADMIN — DOCUSATE SODIUM 50MG AND SENNOSIDES 8.6MG 2 TABLET: 8.6; 5 TABLET, FILM COATED ORAL at 20:13

## 2021-05-08 ENCOUNTER — APPOINTMENT (OUTPATIENT)
Dept: GENERAL RADIOLOGY | Facility: HOSPITAL | Age: 46
End: 2021-05-08

## 2021-05-08 LAB
ALBUMIN SERPL-MCNC: 3.5 G/DL (ref 3.5–5.2)
ANION GAP SERPL CALCULATED.3IONS-SCNC: 12.9 MMOL/L (ref 5–15)
BUN SERPL-MCNC: 59 MG/DL (ref 6–20)
BUN/CREAT SERPL: 50.9 (ref 7–25)
CALCIUM SPEC-SCNC: 8.3 MG/DL (ref 8.6–10.5)
CHLORIDE SERPL-SCNC: 99 MMOL/L (ref 98–107)
CO2 SERPL-SCNC: 19.1 MMOL/L (ref 22–29)
CREAT SERPL-MCNC: 1.16 MG/DL (ref 0.57–1)
GFR SERPL CREATININE-BSD FRML MDRD: 50 ML/MIN/1.73
GLUCOSE BLDC GLUCOMTR-MCNC: 144 MG/DL (ref 70–130)
GLUCOSE BLDC GLUCOMTR-MCNC: 147 MG/DL (ref 70–130)
GLUCOSE BLDC GLUCOMTR-MCNC: 183 MG/DL (ref 70–130)
GLUCOSE BLDC GLUCOMTR-MCNC: 224 MG/DL (ref 70–130)
GLUCOSE SERPL-MCNC: 213 MG/DL (ref 65–99)
INR PPP: 1.28 (ref 0.9–1.1)
PHOSPHATE SERPL-MCNC: 3.8 MG/DL (ref 2.5–4.5)
POTASSIUM SERPL-SCNC: 4.2 MMOL/L (ref 3.5–5.2)
PROTHROMBIN TIME: 15.8 SECONDS (ref 11.7–14.2)
SODIUM SERPL-SCNC: 131 MMOL/L (ref 136–145)

## 2021-05-08 PROCEDURE — 97110 THERAPEUTIC EXERCISES: CPT

## 2021-05-08 PROCEDURE — 25010000002 ENOXAPARIN PER 10 MG: Performed by: NURSE PRACTITIONER

## 2021-05-08 PROCEDURE — 85610 PROTHROMBIN TIME: CPT | Performed by: NURSE PRACTITIONER

## 2021-05-08 PROCEDURE — 80069 RENAL FUNCTION PANEL: CPT | Performed by: INTERNAL MEDICINE

## 2021-05-08 PROCEDURE — 82962 GLUCOSE BLOOD TEST: CPT

## 2021-05-08 PROCEDURE — 71045 X-RAY EXAM CHEST 1 VIEW: CPT

## 2021-05-08 PROCEDURE — 99232 SBSQ HOSP IP/OBS MODERATE 35: CPT | Performed by: INTERNAL MEDICINE

## 2021-05-08 PROCEDURE — 99232 SBSQ HOSP IP/OBS MODERATE 35: CPT | Performed by: NURSE PRACTITIONER

## 2021-05-08 PROCEDURE — 99024 POSTOP FOLLOW-UP VISIT: CPT | Performed by: THORACIC SURGERY (CARDIOTHORACIC VASCULAR SURGERY)

## 2021-05-08 PROCEDURE — 63710000001 INSULIN LISPRO (HUMAN) PER 5 UNITS: Performed by: NURSE PRACTITIONER

## 2021-05-08 PROCEDURE — 25010000002 FUROSEMIDE PER 20 MG: Performed by: INTERNAL MEDICINE

## 2021-05-08 RX ORDER — FUROSEMIDE 10 MG/ML
40 INJECTION INTRAMUSCULAR; INTRAVENOUS ONCE
Status: COMPLETED | OUTPATIENT
Start: 2021-05-08 | End: 2021-05-08

## 2021-05-08 RX ADMIN — FUROSEMIDE 40 MG: 10 INJECTION, SOLUTION INTRAMUSCULAR; INTRAVENOUS at 11:35

## 2021-05-08 RX ADMIN — ENOXAPARIN SODIUM 40 MG: 40 INJECTION SUBCUTANEOUS at 16:57

## 2021-05-08 RX ADMIN — GUAIFENESIN 1200 MG: 600 TABLET, EXTENDED RELEASE ORAL at 08:47

## 2021-05-08 RX ADMIN — HYDROCODONE BITARTRATE AND ACETAMINOPHEN 2 TABLET: 5; 325 TABLET ORAL at 14:39

## 2021-05-08 RX ADMIN — POLYETHYLENE GLYCOL 3350 17 G: 17 POWDER, FOR SOLUTION ORAL at 08:48

## 2021-05-08 RX ADMIN — GUAIFENESIN 1200 MG: 600 TABLET, EXTENDED RELEASE ORAL at 21:10

## 2021-05-08 RX ADMIN — INSULIN LISPRO 3 UNITS: 100 INJECTION, SOLUTION INTRAVENOUS; SUBCUTANEOUS at 16:58

## 2021-05-08 RX ADMIN — PANTOPRAZOLE SODIUM 40 MG: 40 TABLET, DELAYED RELEASE ORAL at 06:35

## 2021-05-08 RX ADMIN — METOPROLOL TARTRATE 12.5 MG: 25 TABLET, FILM COATED ORAL at 08:47

## 2021-05-08 RX ADMIN — ASPIRIN 81 MG: 81 TABLET, COATED ORAL at 08:48

## 2021-05-08 RX ADMIN — LINAGLIPTIN 5 MG: 5 TABLET, FILM COATED ORAL at 08:47

## 2021-05-08 RX ADMIN — METOPROLOL TARTRATE 12.5 MG: 25 TABLET, FILM COATED ORAL at 21:10

## 2021-05-08 RX ADMIN — DOCUSATE SODIUM 50MG AND SENNOSIDES 8.6MG 2 TABLET: 8.6; 5 TABLET, FILM COATED ORAL at 21:09

## 2021-05-08 RX ADMIN — INSULIN LISPRO 5 UNITS: 100 INJECTION, SOLUTION INTRAVENOUS; SUBCUTANEOUS at 06:35

## 2021-05-08 RX ADMIN — WARFARIN 2 MG: 2 TABLET ORAL at 16:58

## 2021-05-08 NOTE — PROGRESS NOTES
NEPHROLOGY PROGRESS NOTE    PATIENT IDENTIFICATION:   Name:  Colette Veronica      MRN:  [id number]     46 y.o.  female             Reason for visit: SOURAV    SUBJECTIVE:   She still feels a bit short of breath, though saturating well on room air; appetite fine    OBJECTIVE:  Vitals:    05/07/21 1908 05/08/21 0338 05/08/21 0654 05/08/21 0720   BP: 121/69 114/63  130/73   BP Location:  Right arm  Right arm   Patient Position:  Lying  Sitting   Pulse:  69  70   Resp:  18  18   Temp:  98.3 °F (36.8 °C)  97.4 °F (36.3 °C)   TempSrc:  Oral  Oral   SpO2:       Weight:   132 kg (291 lb)    Height:         FiO2 (%): 40 %     Body mass index is 49.93 kg/m².    Intake/Output Summary (Last 24 hours) at 5/8/2021 0923  Last data filed at 5/8/2021 0826  Gross per 24 hour   Intake 650 ml   Output 1600 ml   Net -950 ml     Wt Readings from Last 1 Encounters:   05/08/21 0654 132 kg (291 lb)   05/07/21 0633 132 kg (290 lb 6.4 oz)   05/06/21 0654 131 kg (289 lb 11.2 oz)   05/05/21 0713 133 kg (294 lb 3.2 oz)   05/02/21 2144 127 kg (279 lb 12.8 oz)   05/01/21 0520 128 kg (281 lb 14.4 oz)   04/30/21 1910 129 kg (284 lb)     Wt Readings from Last 3 Encounters:   05/08/21 132 kg (291 lb)   04/29/21 129 kg (284 lb)   04/28/21 129 kg (284 lb)         Exam:  NAD; pleasant; oriented; looks stated age  Facial asymmetry with Bell's palsy on the right  MMM; AT/NC; telangiectasias on face  No eye discharge; no scleral icterus  No JVD; no carotid bruits  Coarse breath sounds bilat; a few wheezes; not labored on RA  RRR, no rub  Soft, NT, +D, BS+  +1-2 doughy edema  +clubbing  No asterixis  Moves all extremities   Mood and affect are normal  Speech is fluent    Scheduled meds:    aspirin, 81 mg, Oral, Daily  chlorhexidine, 15 mL, Mouth/Throat, Q12H  enoxaparin, 40 mg, Subcutaneous, Daily  guaiFENesin, 1,200 mg, Oral, Q12H  insulin lispro, 0-14 Units, Subcutaneous, 4x Daily With Meals & Nightly  linagliptin, 5 mg, Oral, Daily  metoprolol tartrate,  12.5 mg, Oral, Q12H  mupirocin, , Each Nare, BID  pantoprazole, 40 mg, Oral, QAM  polyethylene glycol, 17 g, Oral, Daily  senna-docusate sodium, 2 tablet, Oral, Nightly  warfarin, 2 mg, Oral, Daily      IV meds:                        sodium chloride, 30 mL/hr, Last Rate: Stopped (05/04/21 0500)        Data Review:    Results from last 7 days   Lab Units 05/08/21  0344 05/07/21  0414 05/06/21  1620 05/06/21  1209 05/05/21  0421 05/03/21  0447   SODIUM mmol/L 131* 130*  --  130* 132* 134*   POTASSIUM mmol/L 4.2 4.2 4.5 4.7 5.2 4.0   CHLORIDE mmol/L 99 100  --  96* 99 99   CO2 mmol/L 19.1* 18.3*  --  17.9* 18.6* 23.0   BUN mg/dL 59* 66*  --  59* 38* 21*   CREATININE mg/dL 1.16* 1.58*  --  2.10* 1.85* 0.83   CALCIUM mg/dL 8.3* 8.3*  --  8.8 8.4* 8.8   BILIRUBIN mg/dL  --   --   --  0.7 0.9 0.6   ALK PHOS U/L  --   --   --  74 63 96   ALT (SGPT) U/L  --   --   --  12 13 17   AST (SGOT) U/L  --   --   --  22 38* 21   GLUCOSE mg/dL 213* 153*  --  208* 179* 160*     Estimated Creatinine Clearance: 80.1 mL/min (A) (by C-G formula based on SCr of 1.16 mg/dL (H)).  Results from last 7 days   Lab Units 05/07/21  0414 05/06/21  1414   SODIUM UR mmol/L  --  <20   CREATININE UR mg/dL  --  125.1   URIC ACID mg/dL 8.3*  --      Results from last 7 days   Lab Units 05/08/21  0344 05/04/21  0254 05/03/21  1953 05/03/21  1534   MAGNESIUM mg/dL  --  2.7* 2.6 2.4   PHOSPHORUS mg/dL 3.8 5.6* 4.1 3.1       Results from last 7 days   Lab Units 05/07/21  0414 05/06/21  0338 05/05/21  0421 05/04/21  0254 05/03/21  1953   WBC 10*3/mm3 9.67 13.60* 14.90* 12.02* 10.86*   HEMOGLOBIN g/dL 11.9* 13.0 12.4 13.4 13.3   PLATELETS 10*3/mm3 114* 97* 78* 98* 103*       Results from last 7 days   Lab Units 05/08/21  0344 05/05/21  0421 05/04/21  0254 05/03/21  1534   INR  1.28* 1.31* 1.33* 1.49*             ASSESSMENT:     Nonrheumatic aortic valve stenosis    Aortic stenosis    1.  SOURAV, nonoliguric, improving:  prerenal from hypotension and fluid  sequestration following multi-valvular heart surgery.  Hyponatremia in the setting of cirrhosis and edema, unchanged; normal potassium and likely NAGMA.  FENa <1%  2.  Critical aortic stenosis s/p bovine aortic valve replacement 5/3 with mitral valve replacement (bovine) as well  3.  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  Hypotension, resolved after IVF  5.  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  COPD  7.  Obesity        PLAN:  1.  Furosemide 40 mg IV x 1 dose today  2.  Surveillance labs      Kelvin Sandoval MD  5/8/2021    09:23 EDT

## 2021-05-08 NOTE — PROGRESS NOTES
LOS: 9 days   Patient Care Team:  Tayo Sanon MD as PCP - General (Family Medicine)    Chief Complaint:     F/u MVR, AVR, fibroblastoma removals    Interval History:     She has chest pressure when she lies down.  Her legs feel weak when she is walking.  She does not feel heart racing or skipping.  She has had no dizziness or syncope.  She is eating.    Objective   Vital Signs  Temp:  [97.4 °F (36.3 °C)-98.3 °F (36.8 °C)] 97.4 °F (36.3 °C)  Heart Rate:  [63-70] 63  Resp:  [18] 18  BP: (111-136)/(63-73) 121/68    Intake/Output Summary (Last 24 hours) at 5/8/2021 1309  Last data filed at 5/8/2021 1200  Gross per 24 hour   Intake 530 ml   Output 1600 ml   Net -1070 ml       Comfortable NAD  Neck supple, no JVD or thyromegaly appreciated  S1/S2 RRR, no m/r/g  Lungs CTA B, normal effort  Abdomen S/NT/ND (+) BS, no HSM appreciated  Extremities warm, no clubbing, cyanosis, or edema  No visible or palpable skin lesions  A/Ox4, mood and affect appropriate    Results Review:      Results from last 7 days   Lab Units 05/08/21  0344 05/07/21  0414 05/06/21  1620 05/06/21  1209   SODIUM mmol/L 131* 130*  --  130*   POTASSIUM mmol/L 4.2 4.2 4.5 4.7   CHLORIDE mmol/L 99 100  --  96*   CO2 mmol/L 19.1* 18.3*  --  17.9*   BUN mg/dL 59* 66*  --  59*   CREATININE mg/dL 1.16* 1.58*  --  2.10*   GLUCOSE mg/dL 213* 153*  --  208*   CALCIUM mg/dL 8.3* 8.3*  --  8.8         Results from last 7 days   Lab Units 05/07/21  0414 05/06/21  0338 05/05/21  0421   WBC 10*3/mm3 9.67 13.60* 14.90*   HEMOGLOBIN g/dL 11.9* 13.0 12.4   HEMATOCRIT % 36.3 39.0 37.8   PLATELETS 10*3/mm3 114* 97* 78*     Results from last 7 days   Lab Units 05/08/21  0344 05/05/21  0421 05/04/21  0254 05/03/21  1534   INR  1.28* 1.31* 1.33* 1.49*   APTT seconds  --   --   --  35.3         Results from last 7 days   Lab Units 05/04/21  0254   MAGNESIUM mg/dL 2.7*           I reviewed the patient's new clinical results.  I personally viewed and interpreted the  patient's EKG/Telemetry data        Medication Review:   aspirin, 81 mg, Oral, Daily  chlorhexidine, 15 mL, Mouth/Throat, Q12H  enoxaparin, 40 mg, Subcutaneous, Daily  guaiFENesin, 1,200 mg, Oral, Q12H  insulin lispro, 0-14 Units, Subcutaneous, 4x Daily With Meals & Nightly  linagliptin, 5 mg, Oral, Daily  metoprolol tartrate, 12.5 mg, Oral, Q12H  mupirocin, , Each Nare, BID  pantoprazole, 40 mg, Oral, QAM  polyethylene glycol, 17 g, Oral, Daily  senna-docusate sodium, 2 tablet, Oral, Nightly  warfarin, 2 mg, Oral, Daily        sodium chloride, 30 mL/hr, Last Rate: Stopped (05/04/21 0500)        Assessment/Plan       Nonrheumatic aortic valve stenosis    Aortic stenosis    1.  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   2.  Extensive mobile masses on the aortic valve with critical aortic stenosis, s/p 21mm bovine pericardial AVR 5/3/21  3.  Small mobile masses on the tips of the mitral valve, s/p 29mm porcine valve 5/3/21  4.  Morbid obesity, complicating all aspects of care  5.  Heavy tobacco use (2 packs/day) - does not plan ot smoke anymore.   6.  COPD, without acute exacerbation  7.  Liver cirrhosis, newly diagnosed this admission by CT scan - GI following. Likely ELY.   8.  Mild splenomegaly, likely related to cirrhosis  9.  History of pulmonary embolism in approximately 2000 (unprovoked)  10.  LBBB, new.   11.  SOURAV improving - nephrology following.   12.  Epistaxis 5/6/21.  No recurrence    Overall doing much better.  At this point just require strengthening.  I will see her going anywhere before Monday and she will likely need home health and home physical therapy.    Laura Means MD  05/08/21  13:09 EDT

## 2021-05-08 NOTE — PROGRESS NOTES
LOS: 9 days   Patient Care Team:  Tayo Sanon MD as PCP - General (Family Medicine)    Chief Complaint: post op    Subjective      Vital Signs  Temp:  [97.4 °F (36.3 °C)-98.3 °F (36.8 °C)] 97.4 °F (36.3 °C)  Heart Rate:  [65-72] 70  Resp:  [18] 18  BP: (114-136)/(63-73) 130/73  Body mass index is 49.93 kg/m².    Intake/Output Summary (Last 24 hours) at 5/8/2021 0759  Last data filed at 5/8/2021 0600  Gross per 24 hour   Intake 530 ml   Output 1600 ml   Net -1070 ml     No intake/output data recorded.          05/06/21  0654 05/07/21  0633 05/08/21  0654   Weight: 131 kg (289 lb 11.2 oz) 132 kg (290 lb 6.4 oz) 132 kg (291 lb)         Objective    Results Review:        WBC WBC   Date Value Ref Range Status   05/07/2021 9.67 3.40 - 10.80 10*3/mm3 Final   05/06/2021 13.60 (H) 3.40 - 10.80 10*3/mm3 Final      HGB Hemoglobin   Date Value Ref Range Status   05/07/2021 11.9 (L) 12.0 - 15.9 g/dL Final   05/06/2021 13.0 12.0 - 15.9 g/dL Final      HCT Hematocrit   Date Value Ref Range Status   05/07/2021 36.3 34.0 - 46.6 % Final   05/06/2021 39.0 34.0 - 46.6 % Final      Platelets Platelets   Date Value Ref Range Status   05/07/2021 114 (L) 140 - 450 10*3/mm3 Final   05/06/2021 97 (L) 140 - 450 10*3/mm3 Final        PT/INR:    Protime   Date Value Ref Range Status   05/08/2021 15.8 (H) 11.7 - 14.2 Seconds Final   /  INR   Date Value Ref Range Status   05/08/2021 1.28 (H) 0.90 - 1.10 Final       Sodium Sodium   Date Value Ref Range Status   05/08/2021 131 (L) 136 - 145 mmol/L Final   05/07/2021 130 (L) 136 - 145 mmol/L Final   05/06/2021 130 (L) 136 - 145 mmol/L Final   05/06/2021 128 (L) 136 - 145 mmol/L Final      Potassium Potassium   Date Value Ref Range Status   05/08/2021 4.2 3.5 - 5.2 mmol/L Final   05/07/2021 4.2 3.5 - 5.2 mmol/L Final   05/06/2021 4.5 3.5 - 5.2 mmol/L Final   05/06/2021 4.7 3.5 - 5.2 mmol/L Final   05/06/2021 5.3 (H) 3.5 - 5.2 mmol/L Final      Chloride Chloride   Date Value Ref Range Status    05/08/2021 99 98 - 107 mmol/L Final   05/07/2021 100 98 - 107 mmol/L Final   05/06/2021 96 (L) 98 - 107 mmol/L Final   05/06/2021 97 (L) 98 - 107 mmol/L Final      Bicarbonate CO2   Date Value Ref Range Status   05/08/2021 19.1 (L) 22.0 - 29.0 mmol/L Final   05/07/2021 18.3 (L) 22.0 - 29.0 mmol/L Final   05/06/2021 17.9 (L) 22.0 - 29.0 mmol/L Final   05/06/2021 16.7 (L) 22.0 - 29.0 mmol/L Final      BUN BUN   Date Value Ref Range Status   05/08/2021 59 (H) 6 - 20 mg/dL Final   05/07/2021 66 (H) 6 - 20 mg/dL Final   05/06/2021 59 (H) 6 - 20 mg/dL Final   05/06/2021 54 (H) 6 - 20 mg/dL Final      Creatinine Creatinine   Date Value Ref Range Status   05/08/2021 1.16 (H) 0.57 - 1.00 mg/dL Final   05/07/2021 1.58 (H) 0.57 - 1.00 mg/dL Final   05/06/2021 2.10 (H) 0.57 - 1.00 mg/dL Final   05/06/2021 2.01 (H) 0.57 - 1.00 mg/dL Final      Calcium Calcium   Date Value Ref Range Status   05/08/2021 8.3 (L) 8.6 - 10.5 mg/dL Final   05/07/2021 8.3 (L) 8.6 - 10.5 mg/dL Final   05/06/2021 8.8 8.6 - 10.5 mg/dL Final   05/06/2021 8.6 8.6 - 10.5 mg/dL Final      Magnesium No results found for: MG       aspirin, 81 mg, Oral, Daily  chlorhexidine, 15 mL, Mouth/Throat, Q12H  enoxaparin, 40 mg, Subcutaneous, Daily  guaiFENesin, 1,200 mg, Oral, Q12H  insulin lispro, 0-14 Units, Subcutaneous, 4x Daily With Meals & Nightly  metoprolol tartrate, 12.5 mg, Oral, Q12H  mupirocin, , Each Nare, BID  pantoprazole, 40 mg, Oral, QAM  polyethylene glycol, 17 g, Oral, Daily  senna-docusate sodium, 2 tablet, Oral, Nightly  warfarin, 2 mg, Oral, Daily      sodium chloride, 30 mL/hr, Last Rate: Stopped (05/04/21 0500)            Patient Active Problem List   Diagnosis Code   • Aortic stenosis I35.0   • Nonrheumatic aortic valve stenosis I35.0       Assessment & Plan       -Critical aortic stenosis-- s/p AVR (tissue), MVR, Neocords x2 to reconstruct the anterior papillary muscles.  Removal of multiple intracardiac tumors POD#5  Chuckie  -Myxomatous multiple mobile mass   -Current tobacco abuse-- encourage cessation   -Morbid obesity   -hx of PE   -bell's palsy   -cirrhotic liver on CT scan  -leukocytosis- probable reactive  -TCP-- plt count 97     Looks good this monring  Encourage pulmonary toilet  Tolerated beta blocker  Discontinue AV wires  Will start coumadin tonight-- d/w Dr. Noguera he would like to anticoagulate her for 6 weeks   Routine care       JAUN Orlando  05/08/21  07:59 EDT

## 2021-05-08 NOTE — PLAN OF CARE
Goal Outcome Evaluation:  Plan of Care Reviewed With: patient  Progress: improving  Outcome Summary: POD 5 AVR, MVR. SR on tele, VSS. No episodes of orthostatic hypotension today, and no c/o dizziness or lightheadedness. On RA. AV wires dc'd per order. Ambulating with SBA. Pain treated per MAR. WC.

## 2021-05-08 NOTE — THERAPY TREATMENT NOTE
Patient Name: Colette Veronica  : 1975    MRN: [id number]                              Today's Date: 2021       Admit Date: 2021    Visit Dx:     ICD-10-CM ICD-9-CM   1. Nonrheumatic aortic valve stenosis  I35.0 424.1   2. S/P MVR (mitral valve replacement)  Z95.2 V43.3   3. S/P AVR (aortic valve replacement)  Z95.2 V43.3     Patient Active Problem List   Diagnosis   • Aortic stenosis   • Nonrheumatic aortic valve stenosis     Past Medical History:   Diagnosis Date   • Asthma    • Bell's palsy    • COPD (chronic obstructive pulmonary disease) (CMS/[facility])    • Diabetes mellitus (CMS/[facility])    • GERD (gastroesophageal reflux disease)    • Hyperlipidemia    • Hypertension      Past Surgical History:   Procedure Laterality Date   • AORTIC VALVE REPAIR/REPLACEMENT MITRAL VALVE REPAIR/REPLACEMENT N/A 5/3/2021    Procedure: JACKELYN STERNOTOMY AORTIC VALVE REPLACEMENT, MITRAL VALVE REPLACEMENT, EXCISION OF BENIGN PAPILLARY FIBROELASTOMAS  AND PRP;  Surgeon: Jr Daniel Noguera MD;  Location: [facility] MAIN OR;  Service: Cardiothoracic;  Laterality: N/A;   • CARDIAC CATHETERIZATION N/A 2021    Procedure: Left Heart Cath NO LV DO NOT CROSS AORTIC VALVE;  Surgeon: Pedro Conner MD;  Location: [facility] CATH INVASIVE LOCATION;  Service: Cardiovascular;  Laterality: N/A;   • CARDIAC CATHETERIZATION N/A 2021    Procedure: Coronary angiography;  Surgeon: Pedro Conner MD;  Location: [facility] CATH INVASIVE LOCATION;  Service: Cardiovascular;  Laterality: N/A;   • HAND SURGERY Right      General Information     Row Name 21 1010          Physical Therapy Time and Intention    Document Type  therapy note (daily note)  -CS     Mode of Treatment  individual therapy;physical therapy  -CS     Row Name 21 1010          General Information    Patient Profile Reviewed  yes  -CS     Existing Precautions/Restrictions  fall;cardiac;sternal  -CS     Row Name 21 1010          Cognition    Orientation  Status (Cognition)  oriented to;person;place  -       User Key  (r) = Recorded By, (t) = Taken By, (c) = Cosigned By    Initials Name Provider Type    Alan Weiss, PT Physical Therapist        Mobility     [facility] Name 05/08/21 1010          Sit-Stand Transfer    Sit-Stand [place] (Transfers)  supervision  -CS     Row Name 05/08/21 1010          Gait/Stairs (Locomotion)    [place] Level (Gait)  contact guard  -     Distance in Feet (Gait)  150  -CS     Deviations/Abnormal Patterns (Gait)  gait speed decreased;stride length decreased  -CS       User Key  (r) = Recorded By, (t) = Taken By, (c) = Cosigned By    Initials Name Provider Type    Alan Weiss, PT Physical Therapist        Obj/Interventions    No documentation.       Goals/Plan    No documentation.       Clinical Impression     Row Name 05/08/21 1010          Pain    Additional Documentation  Pain Scale: FACES Pre/Post-Treatment (Group)  -CS     Row Name 05/08/21 1010          Pain Scale: Numbers Pre/Post-Treatment    Pain Location - Orientation  incisional  -     Pain Intervention(s)  Repositioned;Ambulation/increased activity  -CS     Row Name 05/08/21 1010          Pain Scale: FACES Pre/Post-Treatment    Pain: FACES Scale, Pretreatment  2-->hurts little bit  -CS     Posttreatment Pain Rating  2-->hurts little bit  -CS     Row Name 05/08/21 1010          Plan of Care Review    Plan of Care Reviewed With  patient  -CS     Row Name 05/08/21 1010          Positioning and Restraints    Pre-Treatment Position  sitting in chair/recliner  -     Post Treatment Position  bed  -CS     In Bed  supine;call light within reach;encouraged to call for assist;notified nsg  -       User Key  (r) = Recorded By, (t) = Taken By, (c) = Cosigned By    Initials Name Provider Type    Alan Weiss, PT Physical Therapist        Outcome Measures     Row Name 05/08/21 1011          How much help from another person do you currently need...     Turning from your back to your side while in flat bed without using bedrails?  3  -CS     Moving from lying on back to sitting on the side of a flat bed without bedrails?  3  -CS     Moving to and from a bed to a chair (including a wheelchair)?  3  -CS     Standing up from a chair using your arms (e.g., wheelchair, bedside chair)?  4  -CS     Climbing 3-5 steps with a railing?  3  -CS     To walk in hospital room?  4  -CS     AM-PAC 6 Clicks Score (PT)  20  -CS     Row Name 05/08/21 1011          Functional Assessment    Outcome Measure Options  AM-PAC 6 Clicks Basic Mobility (PT)  -CS       User Key  (r) = Recorded By, (t) = Taken By, (c) = Cosigned By    Initials Name Provider Type    CS Alan Olivares, PT Physical Therapist        Physical Therapy Education                 Title: PT OT SLP Therapies (Done)     Topic: Physical Therapy (Done)     Point: Mobility training (Done)     Learning Progress Summary           Patient Acceptance, E,TB, VU,NR by CS at 5/8/2021 1011    Acceptance, E,TB, VU by LB at 5/7/2021 1215    Acceptance, E,TB, VU,NR by LB at 5/6/2021 1506    Acceptance, E, VU by EM at 5/5/2021 0949    Acceptance, E,D, VU,NR by MS at 5/4/2021 1046                   Point: Home exercise program (Done)     Learning Progress Summary           Patient Acceptance, E,TB, VU,NR by CS at 5/8/2021 1011    Acceptance, E, VU by EM at 5/5/2021 0949    Acceptance, E,D, VU,NR by MS at 5/4/2021 1046                   Point: Body mechanics (Done)     Learning Progress Summary           Patient Acceptance, E,TB, VU,NR by CS at 5/8/2021 1011    Acceptance, E,D, VU,NR by MS at 5/4/2021 1046                   Point: Precautions (Done)     Learning Progress Summary           Patient Acceptance, E,TB, VU,NR by CS at 5/8/2021 1011    Acceptance, E,D, VU,NR by MS at 5/4/2021 1046                               User Key     Initials Effective Dates Name Provider Type Discipline     04/03/18 -  Dedra Varghese, PT Physical  Therapist PT    EM 04/03/18 -  Tasneem Yost, PT Physical Therapist PT    MS 04/03/18 -  Paras Matthews, PT Physical Therapist PT    CS 05/14/18 -  Alan Olivares PT Physical Therapist PT              PT Recommendation and Plan     Plan of Care Reviewed With: patient     Time Calculation:   PT Charges     Row Name 05/08/21 1012             Time Calculation    Start Time  1000  -CS      Stop Time  1012  -CS      Time Calculation (min)  12 min  -CS      PT - Next Appointment  05/08/21  -      PT Goal Re-Cert Due Date  05/09/21  -        User Key  (r) = Recorded By, (t) = Taken By, (c) = Cosigned By    Initials Name Provider Type    CS Alan Olivares, PT Physical Therapist        Therapy Charges for Today     Code Description Service Date Service Provider Modifiers Qty    35312231811 HC PT THER PROC EA 15 MIN 5/8/2021 Alan Olivares, PT GP 1          PT G-Codes  Outcome Measure Options: AM-PAC 6 Clicks Basic Mobility (PT)  AM-PAC 6 Clicks Score (PT): 20    Alan Olivares PT  5/8/2021

## 2021-05-08 NOTE — CASE MANAGEMENT/SOCIAL WORK
Continued Stay Note  [facility]     Patient Name: Colette Veronica  MRN: [id number]  Today's Date: 5/7/2021    Admit Date: 4/28/2021    Discharge Plan     Row Name 05/07/21 2011       Plan    Plan  Home w/ family;  [facility]  referral pending    Plan Comments  CCP corrected Pt mobile number on her face sheet.  Pt reports her number is [phone].  Pt reports she plans to d/c home with assistance of her significant other, Davide Mckeon.  CCP explained to Pt that [facility] was not able to accept her due to low staffing.  CCP placed home health referral to [facility] and they declined due to Pt payer source.  [facility]  referral is still pending.  [place]/[facility] is having benefits run.  Pt reports that her sister in law, Megan is a nurse and can check in on her when she discharges home.  CCP will follow up on [facility]  referral.  SS/CCP        Discharge Codes    No documentation.             Anabel Joseph, RN

## 2021-05-08 NOTE — PROGRESS NOTES
Gastroenterology   Inpatient Progress Note    Reason for Follow Up:  Ptosis, elevated liver function test    Subjective  Interval History:   Patient seated with some shortness of breath secondary to cardiac surgery, denies GI complaints, tolerating oral intake without nausea, vomiting or abdominal pain    Current Facility-Administered Medications:   •  acetaminophen (TYLENOL) tablet 650 mg, 650 mg, Oral, Q4H PRN **OR** acetaminophen (TYLENOL) 160 MG/5ML solution 650 mg, 650 mg, Oral, Q4H PRN **OR** acetaminophen (TYLENOL) suppository 650 mg, 650 mg, Rectal, Q4H PRN, Claire Frazier APRN  •  ALPRAZolam (XANAX) tablet 0.25 mg, 0.25 mg, Oral, Q8H PRN, Claire Frazier APRN  •  aspirin EC tablet 81 mg, 81 mg, Oral, Daily, Claire Frazier APRN, 81 mg at 05/08/21 0848  •  bisacodyl (DULCOLAX) EC tablet 10 mg, 10 mg, Oral, Daily PRN, Claire Frazier APRN  •  bisacodyl (DULCOLAX) suppository 10 mg, 10 mg, Rectal, Daily PRN, Claire Frazier APRN  •  chlorhexidine (PERIDEX) 0.12 % solution 15 mL, 15 mL, Mouth/Throat, Q12H, Claire Frazier APRN, 15 mL at 05/05/21 2106  •  cyclobenzaprine (FLEXERIL) tablet 5 mg, 5 mg, Oral, Q8H PRN, Claire Frazier APRN, 5 mg at 05/05/21 1503  •  dextrose (D50W) 25 g/ 50mL Intravenous Solution 25 g, 25 g, Intravenous, Q15 Min PRN, Claire Frazier APRN  •  dextrose (GLUTOSE) oral gel 15 g, 15 g, Oral, Q15 Min PRN, Claire Frazier APRN  •  enoxaparin (LOVENOX) syringe 40 mg, 40 mg, Subcutaneous, Daily, Claire Frazier APRN, 40 mg at 05/07/21 1658  •  glucagon (human recombinant) (GLUCAGEN DIAGNOSTIC) injection 1 mg, 1 mg, Subcutaneous, Q15 Min PRN, Claire Frazier, APRN  •  guaiFENesin (MUCINEX) 12 hr tablet 1,200 mg, 1,200 mg, Oral, Q12H, Claire Frazier, APRN, 1,200 mg at 05/08/21 0847  •  HYDROcodone-acetaminophen (NORCO) 5-325 MG per tablet 2 tablet, 2 tablet, Oral, Q4H PRN, Claire Frazier APRN, 2 tablet at 05/07/21 2235  •  insulin lispro (ADMELOG) injection 0-14  Units, 0-14 Units, Subcutaneous, 4x Daily With Meals & Nightly, Claire Frazier APRN, 5 Units at 05/08/21 0635  •  ipratropium-albuterol (DUO-NEB) nebulizer solution 3 mL, 3 mL, Nebulization, Q4H PRN, Claire Frazier APRN, 3 mL at 05/07/21 0920  •  linagliptin (TRADJENTA) tablet 5 mg, 5 mg, Oral, Daily, Claire Frazier APRN, 5 mg at 05/08/21 0847  •  magnesium hydroxide (MILK OF MAGNESIA) suspension 2400 mg/10mL 10 mL, 10 mL, Oral, Daily PRN, Claire Frazier APRN  •  metoprolol tartrate (LOPRESSOR) tablet 12.5 mg, 12.5 mg, Oral, Q12H, Claire Frazier APRN, 12.5 mg at 05/08/21 0847  •  morphine injection 1 mg, 1 mg, Intravenous, Q4H PRN **AND** naloxone (NARCAN) injection 0.4 mg, 0.4 mg, Intravenous, Q5 Min PRN, Claire Frazier APRN  •  morphine injection 4 mg, 4 mg, Intravenous, Q30 Min PRN, lCaire Frazier APRN, 2 mg at 05/03/21 2259  •  mupirocin (BACTROBAN) 2 % nasal ointment, , Each Nare, BID, Claire Frazier APRN, 1 application at 05/07/21 0850  •  ondansetron (ZOFRAN) injection 4 mg, 4 mg, Intravenous, Q6H PRN, Claire Frazier APRN  •  [COMPLETED] pantoprazole (PROTONIX) injection 40 mg, 40 mg, Intravenous, Once, 40 mg at 05/03/21 1546 **FOLLOWED BY** pantoprazole (PROTONIX) EC tablet 40 mg, 40 mg, Oral, QAM, Claire Frazier APRN, 40 mg at 05/08/21 0635  •  polyethylene glycol (MIRALAX) packet 17 g, 17 g, Oral, Daily, Claire Frazier APRN, 17 g at 05/08/21 0848  •  potassium chloride (K-DUR,KLOR-CON) ER tablet 40 mEq, 40 mEq, Oral, PRN **OR** potassium chloride (KLOR-CON) packet 40 mEq, 40 mEq, Oral, PRN, Claire Frazier, APRN  •  potassium chloride 10 mEq in 100 mL IVPB, 10 mEq, Intravenous, Q1H PRN **OR** potassium chloride 10 mEq in 100 mL IVPB, 10 mEq, Intravenous, Q1H PRN, Claire Frazier, APRN  •  potassium chloride 20 mEq in 50 mL IVPB, 20 mEq, Intravenous, Q1H PRN, Claire Frazier, APRN  •  potassium chloride 20 mEq in 50 mL IVPB, 20 mEq, Intravenous, Q1H PRN, Claire Frazier,  APRN  •  potassium chloride 20 mEq in 50 mL IVPB, 20 mEq, Intravenous, Q1H PRN, Claire Frazier APRN  •  sennosides-docusate (PERICOLACE) 8.6-50 MG per tablet 2 tablet, 2 tablet, Oral, Nightly, Claire Frazier APRN, 2 tablet at 05/07/21 2013  •  sodium chloride 0.9 % infusion, 30 mL/hr, Intravenous, Continuous PRN, Claire Frazier APRN, Stopped at 05/04/21 0500  •  traMADol (ULTRAM) tablet 50 mg, 50 mg, Oral, Q6H PRN, Claire Frazier APRN, 50 mg at 05/05/21 1725  •  warfarin (COUMADIN) tablet 2 mg, 2 mg, Oral, Daily, Claire Frazier APRN  Review of Systems:               Gastroenterology negative    Objective     Vital Signs  Temp:  [97.4 °F (36.3 °C)-98.3 °F (36.8 °C)] 97.4 °F (36.3 °C)  Heart Rate:  [63-70] 63  Resp:  [18] 18  BP: (111-136)/(63-73) 121/68  Body mass index is 49.93 kg/m².                  Physical Exam:              General: patient awake, alert and cooperative              Eyes: no scleral icterus              Skin: warm and dry, not jaundiced              Psychiatric: Appropriate affect and behavior                Results Review:                I reviewed the patient's new clinical results.    Results from last 7 days   Lab Units 05/07/21  0414 05/06/21  0338 05/05/21  0421   WBC 10*3/mm3 9.67 13.60* 14.90*   HEMOGLOBIN g/dL 11.9* 13.0 12.4   HEMATOCRIT % 36.3 39.0 37.8   PLATELETS 10*3/mm3 114* 97* 78*     Results from last 7 days   Lab Units 05/08/21  0344 05/07/21  0414 05/06/21  1620 05/06/21  1209 05/05/21  0421 05/03/21  0447   SODIUM mmol/L 131* 130*  --  130* 132* 134*   POTASSIUM mmol/L 4.2 4.2 4.5 4.7 5.2 4.0   CHLORIDE mmol/L 99 100  --  96* 99 99   CO2 mmol/L 19.1* 18.3*  --  17.9* 18.6* 23.0   BUN mg/dL 59* 66*  --  59* 38* 21*   CREATININE mg/dL 1.16* 1.58*  --  2.10* 1.85* 0.83   CALCIUM mg/dL 8.3* 8.3*  --  8.8 8.4* 8.8   BILIRUBIN mg/dL  --   --   --  0.7 0.9 0.6   ALK PHOS U/L  --   --   --  74 63 96   ALT (SGPT) U/L  --   --   --  12 13 17   AST (SGOT) U/L  --   --    --  22 38* 21   GLUCOSE mg/dL 213* 153*  --  208* 179* 160*     Results from last 7 days   Lab Units 05/08/21  0344 05/05/21  0421 05/04/21  0254   INR  1.28* 1.31* 1.33*     No results found for: LIPASE    Radiology:  XR Chest 1 View   Final Result         Electronically signed by Delphine Worthy MD on 05-08-21 at 0553      XR Chest PA & Lateral   Final Result   Bibasilar atelectasis/infiltrate, small bilateral pleural   effusions and interstitial prominence in the perihilar regions   bilaterally all of which were present previously. The left lower lobe   atelectasis/infiltrate is slightly improved.       This report was finalized on 5/7/2021 2:12 PM by Dr. Alvin Fournier M.D.          XR Chest PA & Lateral   Final Result   1. The patient is postop cardiac surgery with aortic and mitral valve   replacement. There is an enlarged cardiomediastinal silhouette and mild   vascular engorgement but no edema. There are small bilateral effusions   and bibasilar atelectasis. No additional active disease is seen in the   chest.       This report was finalized on 5/6/2021 1:41 PM by Dr. Davide Hernández M.D.          XR Chest 1 View   Final Result   Chest tube removal. No pneumothorax.       This report was finalized on 5/5/2021 3:32 PM by Dr. Sanchez Velasquez M.D.          XR Chest 1 View   Final Result   Increasing bibasilar atelectasis.       This report was finalized on 5/5/2021 5:44 AM by Dr. Cassidy Parra M.D.          XR Chest 1 View   Final Result   Improved vascular congestion, although there is some worsening   atelectasis at the left lung base       This report was finalized on 5/4/2021 4:55 AM by Dr. Cassidy Parra M.D.          XR Chest 1 View   Final Result   Postsurgical changes.       This report was finalized on 5/3/2021 4:41 PM by Dr. Sanchez Velasquez M.D.          XR Chest Post CVA Port   Final Result      MRI Cardiac Function Complete With & Without Morphology   Final Result      CT Angiogram  Chest   Final Result   1. The study is limited by poor arterial contrast bolus   2. There is no evidence of aortic aneurysm   3. Extensive aortic valvular calcifications   4. Mildly enlarged mediastinal and hilar lymph nodes are nonspecific and   may be reactive. Would suggest a follow-up chest CT in 6 months   5. Areas of interstitial septal thickening in the lungs are nonspecific   and may reflect interstitial edema.   6. No evidence for pleural effusion or airspace consolidation   7. Cirrhotic liver   8. Mild splenomegaly       Radiation dose reduction techniques were utilized, including automated   exposure control and exposure modulation based on body size.       This report was finalized on 4/28/2021 7:34 PM by Dr. Catalino Francis M.D.          CT Angiogram Abdomen Pelvis   Final Result   1. The study is limited by poor arterial contrast bolus   2. There is no evidence of aortic aneurysm   3. Extensive aortic valvular calcifications   4. Mildly enlarged mediastinal and hilar lymph nodes are nonspecific and   may be reactive. Would suggest a follow-up chest CT in 6 months   5. Areas of interstitial septal thickening in the lungs are nonspecific   and may reflect interstitial edema.   6. No evidence for pleural effusion or airspace consolidation   7. Cirrhotic liver   8. Mild splenomegaly       Radiation dose reduction techniques were utilized, including automated   exposure control and exposure modulation based on body size.       This report was finalized on 4/28/2021 7:34 PM by Dr. Catalino Francis M.D.              Assessment/Plan     Patient Active Problem List   Diagnosis   • Aortic stenosis   • Nonrheumatic aortic valve stenosis       Assessment:  1. CT scan findings of cirrhosis and mild splenomegaly, work-up negative, likely associated with ELY  2. Post AVR, MVR removal of left ventricular wall lesions    These problems are new to me  Plan:  · Alpha 1 antitrypsin levels pending, most likely to  result May 10, discussed with patient, we will monitor peripherally until blood test has resulted, patient denies GI symptoms at today's visit.    I discussed the patients findings and my recommendations with patient and nursing staff.           Lindsay ZAMORANO  [facility] [place] [id number]  Office: [phone]

## 2021-05-08 NOTE — PLAN OF CARE
Goal Outcome Evaluation:  Plan of Care Reviewed With: patient     Pt walked in khanna 150' CGA a couple rest breaks taken for fatigue, overall tolerated well. Will continue to progress as tolerated.

## 2021-05-09 LAB
ALBUMIN SERPL-MCNC: 3.5 G/DL (ref 3.5–5.2)
ANION GAP SERPL CALCULATED.3IONS-SCNC: 17.3 MMOL/L (ref 5–15)
BUN SERPL-MCNC: 51 MG/DL (ref 6–20)
BUN/CREAT SERPL: 52 (ref 7–25)
CALCIUM SPEC-SCNC: 8.4 MG/DL (ref 8.6–10.5)
CHLORIDE SERPL-SCNC: 98 MMOL/L (ref 98–107)
CO2 SERPL-SCNC: 14.7 MMOL/L (ref 22–29)
CREAT SERPL-MCNC: 0.98 MG/DL (ref 0.57–1)
GFR SERPL CREATININE-BSD FRML MDRD: 61 ML/MIN/1.73
GLUCOSE BLDC GLUCOMTR-MCNC: 135 MG/DL (ref 70–130)
GLUCOSE BLDC GLUCOMTR-MCNC: 148 MG/DL (ref 70–130)
GLUCOSE BLDC GLUCOMTR-MCNC: 155 MG/DL (ref 70–130)
GLUCOSE BLDC GLUCOMTR-MCNC: 224 MG/DL (ref 70–130)
GLUCOSE SERPL-MCNC: 151 MG/DL (ref 65–99)
INR PPP: 1.18 (ref 0.9–1.1)
PHOSPHATE SERPL-MCNC: 3.9 MG/DL (ref 2.5–4.5)
POTASSIUM SERPL-SCNC: 3.9 MMOL/L (ref 3.5–5.2)
PROTHROMBIN TIME: 14.8 SECONDS (ref 11.7–14.2)
SODIUM SERPL-SCNC: 130 MMOL/L (ref 136–145)

## 2021-05-09 PROCEDURE — 80069 RENAL FUNCTION PANEL: CPT | Performed by: INTERNAL MEDICINE

## 2021-05-09 PROCEDURE — 25010000002 FUROSEMIDE PER 20 MG: Performed by: INTERNAL MEDICINE

## 2021-05-09 PROCEDURE — 25010000002 ENOXAPARIN PER 10 MG: Performed by: NURSE PRACTITIONER

## 2021-05-09 PROCEDURE — 99232 SBSQ HOSP IP/OBS MODERATE 35: CPT | Performed by: INTERNAL MEDICINE

## 2021-05-09 PROCEDURE — 85610 PROTHROMBIN TIME: CPT | Performed by: NURSE PRACTITIONER

## 2021-05-09 PROCEDURE — 82962 GLUCOSE BLOOD TEST: CPT

## 2021-05-09 PROCEDURE — 63710000001 INSULIN LISPRO (HUMAN) PER 5 UNITS: Performed by: NURSE PRACTITIONER

## 2021-05-09 PROCEDURE — 99024 POSTOP FOLLOW-UP VISIT: CPT | Performed by: THORACIC SURGERY (CARDIOTHORACIC VASCULAR SURGERY)

## 2021-05-09 RX ORDER — WARFARIN SODIUM 4 MG/1
4 TABLET ORAL
Status: DISCONTINUED | OUTPATIENT
Start: 2021-05-09 | End: 2021-05-11 | Stop reason: HOSPADM

## 2021-05-09 RX ORDER — FUROSEMIDE 10 MG/ML
40 INJECTION INTRAMUSCULAR; INTRAVENOUS EVERY 6 HOURS
Status: COMPLETED | OUTPATIENT
Start: 2021-05-09 | End: 2021-05-09

## 2021-05-09 RX ORDER — FUROSEMIDE 40 MG/1
40 TABLET ORAL DAILY
Status: DISCONTINUED | OUTPATIENT
Start: 2021-05-10 | End: 2021-05-11 | Stop reason: HOSPADM

## 2021-05-09 RX ADMIN — HYDROCODONE BITARTRATE AND ACETAMINOPHEN 2 TABLET: 5; 325 TABLET ORAL at 00:21

## 2021-05-09 RX ADMIN — POLYETHYLENE GLYCOL 3350 17 G: 17 POWDER, FOR SOLUTION ORAL at 08:34

## 2021-05-09 RX ADMIN — METOPROLOL TARTRATE 12.5 MG: 25 TABLET, FILM COATED ORAL at 08:34

## 2021-05-09 RX ADMIN — FUROSEMIDE 40 MG: 10 INJECTION, SOLUTION INTRAMUSCULAR; INTRAVENOUS at 16:58

## 2021-05-09 RX ADMIN — PANTOPRAZOLE SODIUM 40 MG: 40 TABLET, DELAYED RELEASE ORAL at 06:37

## 2021-05-09 RX ADMIN — BISACODYL 10 MG: 5 TABLET ORAL at 08:34

## 2021-05-09 RX ADMIN — BISACODYL 10 MG: 10 SUPPOSITORY RECTAL at 16:58

## 2021-05-09 RX ADMIN — METOPROLOL TARTRATE 12.5 MG: 25 TABLET, FILM COATED ORAL at 21:02

## 2021-05-09 RX ADMIN — FUROSEMIDE 40 MG: 10 INJECTION, SOLUTION INTRAMUSCULAR; INTRAVENOUS at 11:17

## 2021-05-09 RX ADMIN — BISACODYL 10 MG: 10 SUPPOSITORY RECTAL at 13:35

## 2021-05-09 RX ADMIN — INSULIN LISPRO 3 UNITS: 100 INJECTION, SOLUTION INTRAVENOUS; SUBCUTANEOUS at 06:37

## 2021-05-09 RX ADMIN — DOCUSATE SODIUM 50MG AND SENNOSIDES 8.6MG 2 TABLET: 8.6; 5 TABLET, FILM COATED ORAL at 21:02

## 2021-05-09 RX ADMIN — LINAGLIPTIN 5 MG: 5 TABLET, FILM COATED ORAL at 08:34

## 2021-05-09 RX ADMIN — WARFARIN 4 MG: 4 TABLET ORAL at 16:58

## 2021-05-09 RX ADMIN — ENOXAPARIN SODIUM 40 MG: 40 INJECTION SUBCUTANEOUS at 16:58

## 2021-05-09 RX ADMIN — GUAIFENESIN 1200 MG: 600 TABLET, EXTENDED RELEASE ORAL at 08:34

## 2021-05-09 RX ADMIN — HYDROCODONE BITARTRATE AND ACETAMINOPHEN 2 TABLET: 5; 325 TABLET ORAL at 17:04

## 2021-05-09 RX ADMIN — ASPIRIN 81 MG: 81 TABLET, COATED ORAL at 08:34

## 2021-05-09 RX ADMIN — INSULIN LISPRO 5 UNITS: 100 INJECTION, SOLUTION INTRAVENOUS; SUBCUTANEOUS at 16:58

## 2021-05-09 RX ADMIN — GUAIFENESIN 1200 MG: 600 TABLET, EXTENDED RELEASE ORAL at 21:02

## 2021-05-09 NOTE — PLAN OF CARE
Goal Outcome Evaluation:  Plan of Care Reviewed With: patient, spouse  Progress: improving  Outcome Summary: POD 6 AVR, MVR. SR on tele, all other VSS. On RA. BIANCA dressing remains in place. No BM post-op, suppository given as ordered. Ambulating SBA. Pain treated per MAR. WCTM.

## 2021-05-09 NOTE — PROGRESS NOTES
NEPHROLOGY PROGRESS NOTE    PATIENT IDENTIFICATION:   Name:  Colette Veronica      MRN:  [id number]     46 y.o.  female             Reason for visit: SOURAV    SUBJECTIVE:   Breathing is more comfortable than yesterday; remains on room air; still feels puffy; appetite improving    OBJECTIVE:  Vitals:    05/08/21 2110 05/09/21 0333 05/09/21 0529 05/09/21 0700   BP: 116/81 123/75  114/67   BP Location:  Right arm  Right arm   Patient Position:  Lying  Sitting   Pulse: 73 63  63   Resp:  16  16   Temp:  97.4 °F (36.3 °C)  97.6 °F (36.4 °C)   TempSrc:  Oral  Oral   SpO2:       Weight:   132 kg (291 lb 1.6 oz)    Height:         FiO2 (%): 40 %     Body mass index is 49.94 kg/m².    Intake/Output Summary (Last 24 hours) at 5/9/2021 1036  Last data filed at 5/9/2021 0805  Gross per 24 hour   Intake 650 ml   Output 2350 ml   Net -1700 ml     Wt Readings from Last 1 Encounters:   05/09/21 0529 132 kg (291 lb 1.6 oz)   05/08/21 0654 132 kg (291 lb)   05/07/21 0633 132 kg (290 lb 6.4 oz)   05/06/21 0654 131 kg (289 lb 11.2 oz)   05/05/21 0713 133 kg (294 lb 3.2 oz)   05/02/21 2144 127 kg (279 lb 12.8 oz)   05/01/21 0520 128 kg (281 lb 14.4 oz)   04/30/21 1910 129 kg (284 lb)     Wt Readings from Last 3 Encounters:   05/09/21 132 kg (291 lb 1.6 oz)   04/29/21 129 kg (284 lb)   04/28/21 129 kg (284 lb)         Exam:  NAD; pleasant; oriented; looks stated age  Facial asymmetry with Bell's palsy on the right  MMM; AT/NC; telangiectasias on face  No eye discharge; no scleral icterus  No JVD; no carotid bruits  Coarse breath sounds bilat; a few wheezes; not labored on RA  RRR, no rub  Soft, NT, +D, BS+  +1-2 edema both legs  +clubbing  No asterixis  Moves all extremities   Mood and affect are normal  Speech is fluent    Scheduled meds:    aspirin, 81 mg, Oral, Daily  chlorhexidine, 15 mL, Mouth/Throat, Q12H  enoxaparin, 40 mg, Subcutaneous, Daily  guaiFENesin, 1,200 mg, Oral, Q12H  insulin lispro, 0-14 Units, Subcutaneous, 4x Daily  With Meals & Nightly  linagliptin, 5 mg, Oral, Daily  metoprolol tartrate, 12.5 mg, Oral, Q12H  mupirocin, , Each Nare, BID  pantoprazole, 40 mg, Oral, QAM  polyethylene glycol, 17 g, Oral, Daily  senna-docusate sodium, 2 tablet, Oral, Nightly  warfarin, 4 mg, Oral, Daily      IV meds:                        sodium chloride, 30 mL/hr, Last Rate: Stopped (05/04/21 0500)        Data Review:    Results from last 7 days   Lab Units 05/09/21 0322 05/08/21 0344 05/07/21  0414 05/06/21  1209 05/05/21  0421 05/03/21  0447   SODIUM mmol/L 130* 131* 130* 130* 132* 134*   POTASSIUM mmol/L 3.9 4.2 4.2 4.7 5.2 4.0   CHLORIDE mmol/L 98 99 100 96* 99 99   CO2 mmol/L 14.7* 19.1* 18.3* 17.9* 18.6* 23.0   BUN mg/dL 51* 59* 66* 59* 38* 21*   CREATININE mg/dL 0.98 1.16* 1.58* 2.10* 1.85* 0.83   CALCIUM mg/dL 8.4* 8.3* 8.3* 8.8 8.4* 8.8   BILIRUBIN mg/dL  --   --   --  0.7 0.9 0.6   ALK PHOS U/L  --   --   --  74 63 96   ALT (SGPT) U/L  --   --   --  12 13 17   AST (SGOT) U/L  --   --   --  22 38* 21   GLUCOSE mg/dL 151* 213* 153* 208* 179* 160*     Estimated Creatinine Clearance: 94.9 mL/min (by C-G formula based on SCr of 0.98 mg/dL).  Results from last 7 days   Lab Units 05/07/21 0414 05/06/21  1414   SODIUM UR mmol/L  --  <20   CREATININE UR mg/dL  --  125.1   URIC ACID mg/dL 8.3*  --      Results from last 7 days   Lab Units 05/09/21  0322 05/08/21  0344 05/04/21  0254 05/03/21 1953 05/03/21  1534   MAGNESIUM mg/dL  --   --  2.7* 2.6 2.4   PHOSPHORUS mg/dL 3.9 3.8 5.6* 4.1 3.1       Results from last 7 days   Lab Units 05/07/21  0414 05/06/21  0338 05/05/21 0421 05/04/21 0254 05/03/21 1953   WBC 10*3/mm3 9.67 13.60* 14.90* 12.02* 10.86*   HEMOGLOBIN g/dL 11.9* 13.0 12.4 13.4 13.3   PLATELETS 10*3/mm3 114* 97* 78* 98* 103*       Results from last 7 days   Lab Units 05/09/21  0322 05/08/21  0344 05/05/21 0421 05/04/21  0254 05/03/21  1534   INR  1.18* 1.28* 1.31* 1.33* 1.49*             ASSESSMENT:     Nonrheumatic aortic  valve stenosis    Aortic stenosis    1.  SOURAV, nonoliguric, resolving:  prerenal from hypotension and fluid sequestration following multi-valvular heart surgery.  Hyponatremia in the setting of cirrhosis and edema, unchanged; normal potassium and likely NAGMA.  FENa <1%  2.  Critical aortic stenosis s/p bovine aortic valve replacement 5/3 with mitral valve replacement (bovine) as well  3.  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  Hypotension, resolved   5.  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  COPD  7.  Obesity        PLAN:  1.  Furosemide 40 mg IV x 2 doses today  2.  Tomorrow will start oral furosemide 40 mg once daily  2.  Surveillance labs      Kelvin Sandoval MD  5/9/2021    10:36 EDT

## 2021-05-09 NOTE — PROGRESS NOTES
LOS: 10 days   Patient Care Team:  Tayo Sanon MD as PCP - General (Family Medicine)    Chief Complaint: post op    Subjective:  Symptoms:  No shortness of breath or chest pain.          Vital Signs  Temp:  [97.4 °F (36.3 °C)-97.8 °F (36.6 °C)] 97.6 °F (36.4 °C)  Heart Rate:  [63-73] 63  Resp:  [16-18] 16  BP: (107-126)/(56-81) 114/67  Body mass index is 49.94 kg/m².    Intake/Output Summary (Last 24 hours) at 5/9/2021 0849  Last data filed at 5/9/2021 0805  Gross per 24 hour   Intake 650 ml   Output 2350 ml   Net -1700 ml     I/O this shift:  In: 120 [P.O.:120]  Out: -              05/07/21  0633 05/08/21  0654 05/09/21  0529   Weight: 132 kg (290 lb 6.4 oz) 132 kg (291 lb) 132 kg (291 lb 1.6 oz)         Objective    Results Review:        WBC WBC   Date Value Ref Range Status   05/07/2021 9.67 3.40 - 10.80 10*3/mm3 Final      HGB Hemoglobin   Date Value Ref Range Status   05/07/2021 11.9 (L) 12.0 - 15.9 g/dL Final      HCT Hematocrit   Date Value Ref Range Status   05/07/2021 36.3 34.0 - 46.6 % Final      Platelets Platelets   Date Value Ref Range Status   05/07/2021 114 (L) 140 - 450 10*3/mm3 Final        PT/INR:    Protime   Date Value Ref Range Status   05/09/2021 14.8 (H) 11.7 - 14.2 Seconds Final   05/08/2021 15.8 (H) 11.7 - 14.2 Seconds Final   /  INR   Date Value Ref Range Status   05/09/2021 1.18 (H) 0.90 - 1.10 Final   05/08/2021 1.28 (H) 0.90 - 1.10 Final       Sodium Sodium   Date Value Ref Range Status   05/09/2021 130 (L) 136 - 145 mmol/L Final   05/08/2021 131 (L) 136 - 145 mmol/L Final   05/07/2021 130 (L) 136 - 145 mmol/L Final   05/06/2021 130 (L) 136 - 145 mmol/L Final      Potassium Potassium   Date Value Ref Range Status   05/09/2021 3.9 3.5 - 5.2 mmol/L Final   05/08/2021 4.2 3.5 - 5.2 mmol/L Final   05/07/2021 4.2 3.5 - 5.2 mmol/L Final   05/06/2021 4.5 3.5 - 5.2 mmol/L Final   05/06/2021 4.7 3.5 - 5.2 mmol/L Final      Chloride Chloride   Date Value Ref Range Status   05/09/2021 98 98  - 107 mmol/L Final   05/08/2021 99 98 - 107 mmol/L Final   05/07/2021 100 98 - 107 mmol/L Final   05/06/2021 96 (L) 98 - 107 mmol/L Final      Bicarbonate CO2   Date Value Ref Range Status   05/09/2021 14.7 (L) 22.0 - 29.0 mmol/L Final   05/08/2021 19.1 (L) 22.0 - 29.0 mmol/L Final   05/07/2021 18.3 (L) 22.0 - 29.0 mmol/L Final   05/06/2021 17.9 (L) 22.0 - 29.0 mmol/L Final      BUN BUN   Date Value Ref Range Status   05/09/2021 51 (H) 6 - 20 mg/dL Final   05/08/2021 59 (H) 6 - 20 mg/dL Final   05/07/2021 66 (H) 6 - 20 mg/dL Final   05/06/2021 59 (H) 6 - 20 mg/dL Final      Creatinine Creatinine   Date Value Ref Range Status   05/09/2021 0.98 0.57 - 1.00 mg/dL Final   05/08/2021 1.16 (H) 0.57 - 1.00 mg/dL Final   05/07/2021 1.58 (H) 0.57 - 1.00 mg/dL Final   05/06/2021 2.10 (H) 0.57 - 1.00 mg/dL Final      Calcium Calcium   Date Value Ref Range Status   05/09/2021 8.4 (L) 8.6 - 10.5 mg/dL Final   05/08/2021 8.3 (L) 8.6 - 10.5 mg/dL Final   05/07/2021 8.3 (L) 8.6 - 10.5 mg/dL Final   05/06/2021 8.8 8.6 - 10.5 mg/dL Final      Magnesium No results found for: MG       aspirin, 81 mg, Oral, Daily  chlorhexidine, 15 mL, Mouth/Throat, Q12H  enoxaparin, 40 mg, Subcutaneous, Daily  guaiFENesin, 1,200 mg, Oral, Q12H  insulin lispro, 0-14 Units, Subcutaneous, 4x Daily With Meals & Nightly  linagliptin, 5 mg, Oral, Daily  metoprolol tartrate, 12.5 mg, Oral, Q12H  mupirocin, , Each Nare, BID  pantoprazole, 40 mg, Oral, QAM  polyethylene glycol, 17 g, Oral, Daily  senna-docusate sodium, 2 tablet, Oral, Nightly  warfarin, 4 mg, Oral, Daily      sodium chloride, 30 mL/hr, Last Rate: Stopped (05/04/21 0500)            Patient Active Problem List   Diagnosis Code   • Aortic stenosis I35.0   • Nonrheumatic aortic valve stenosis I35.0       Assessment & Plan    -Critical aortic stenosis-- s/p AVR (tissue), MVR, Neocords x2 to reconstruct the anterior papillary muscles.  Removal of multiple intracardiac tumors POD#6  Chuckie  -Myxomatous multiple mobile mass   -Current tobacco abuse-- encourage cessation   -Morbid obesity   -hx of PE   -bell's palsy   -cirrhotic liver on CT scan  -leukocytosis- probable reactive  -TCP-- plt count 97     Looks good this morning  Encourage pulmonary toilet  Continue coumadin loading   Routine care         Claire Gomez, JAUN  05/09/21  08:49 EDT

## 2021-05-09 NOTE — PROGRESS NOTES
LOS: 10 days   Patient Care Team:  Tayo Sanon MD as PCP - General (Family Medicine)    Chief Complaint:     F/u MVR and AVR and fibroelastomas.     Interval History:       She walked around the nurses station 3 times yesterday.  She still leg weakness and some mild difficulty breathing.  She is fairly fatigued but overall she is making slow progress.  She did get cleaned up yesterday and that helped.    Objective   Vital Signs  Temp:  [97.4 °F (36.3 °C)-97.8 °F (36.6 °C)] 97.4 °F (36.3 °C)  Heart Rate:  [63-73] 63  Resp:  [16-18] 16  BP: (107-130)/(56-81) 123/75    Intake/Output Summary (Last 24 hours) at 5/9/2021 0718  Last data filed at 5/9/2021 0348  Gross per 24 hour   Intake 650 ml   Output 2350 ml   Net -1700 ml       Comfortable NAD  Neck supple, no JVD or thyromegaly appreciated  S1/S2 RRR, no m/r/g  Lungs CTA B, normal effort  Abdomen S/NT/ND (+) BS, no HSM appreciated  Extremities warm, no clubbing, cyanosis, or edema  No visible or palpable skin lesions  A/Ox4, mood and affect appropriate    Results Review:      Results from last 7 days   Lab Units 05/09/21 0322 05/08/21 0344 05/07/21 0414   SODIUM mmol/L 130* 131* 130*   POTASSIUM mmol/L 3.9 4.2 4.2   CHLORIDE mmol/L 98 99 100   CO2 mmol/L 14.7* 19.1* 18.3*   BUN mg/dL 51* 59* 66*   CREATININE mg/dL 0.98 1.16* 1.58*   GLUCOSE mg/dL 151* 213* 153*   CALCIUM mg/dL 8.4* 8.3* 8.3*         Results from last 7 days   Lab Units 05/07/21  0414 05/06/21  0338 05/05/21 0421   WBC 10*3/mm3 9.67 13.60* 14.90*   HEMOGLOBIN g/dL 11.9* 13.0 12.4   HEMATOCRIT % 36.3 39.0 37.8   PLATELETS 10*3/mm3 114* 97* 78*     Results from last 7 days   Lab Units 05/09/21 0322 05/08/21  0344 05/05/21  0421 05/03/21  1534   INR  1.18* 1.28* 1.31* 1.49*   APTT seconds  --   --   --  35.3         Results from last 7 days   Lab Units 05/04/21  0254   MAGNESIUM mg/dL 2.7*           I reviewed the patient's new clinical results.  I personally viewed and interpreted the  patient's EKG/Telemetry data        Medication Review:   aspirin, 81 mg, Oral, Daily  chlorhexidine, 15 mL, Mouth/Throat, Q12H  enoxaparin, 40 mg, Subcutaneous, Daily  guaiFENesin, 1,200 mg, Oral, Q12H  insulin lispro, 0-14 Units, Subcutaneous, 4x Daily With Meals & Nightly  linagliptin, 5 mg, Oral, Daily  metoprolol tartrate, 12.5 mg, Oral, Q12H  mupirocin, , Each Nare, BID  pantoprazole, 40 mg, Oral, QAM  polyethylene glycol, 17 g, Oral, Daily  senna-docusate sodium, 2 tablet, Oral, Nightly  warfarin, 2 mg, Oral, Daily        sodium chloride, 30 mL/hr, Last Rate: Stopped (05/04/21 0500)        Assessment/Plan       Nonrheumatic aortic valve stenosis    Aortic stenosis    1.  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   2.  Extensive mobile masses on the aortic valve with critical aortic stenosis, s/p 21mm bovine pericardial AVR 5/3/21  3.  Small mobile masses on the tips of the mitral valve, s/p 29mm porcine valve 5/3/21  4.  Morbid obesity, complicating all aspects of care  5.  Heavy tobacco use (2 packs/day) - does not plan ot smoke anymore.   6.  COPD, without acute exacerbation  7.  Liver cirrhosis, newly diagnosed this admission by CT scan - GI following. Likely ELY.   8.  Mild splenomegaly, likely related to cirrhosis  9.  History of pulmonary embolism in approximately 2000 (unprovoked)  10.  LBBB, new.   11.  SOURAV improving - nephrology following.   12.  Epistaxis 5/6/21.  No recurrence    She is doing better each day, would anticipate that she would go home early this next week, maybe Monday or Tuesday with home health and home physical therapy.  No medication changes today but encouraged continued walking and strengthening.    Laura Means MD  05/09/21  07:18 EDT

## 2021-05-09 NOTE — PLAN OF CARE
Goal Outcome Evaluation:  Plan of Care Reviewed With: patient  Progress: improving  Outcome Summary: SR w/ occasional 1st degree. RA. Norco given for pain

## 2021-05-10 PROBLEM — K75.81 NASH (NONALCOHOLIC STEATOHEPATITIS): Status: ACTIVE | Noted: 2021-05-10

## 2021-05-10 PROBLEM — I42.4: Status: ACTIVE | Noted: 2021-05-10

## 2021-05-10 PROBLEM — N17.9 AKI (ACUTE KIDNEY INJURY): Status: ACTIVE | Noted: 2021-05-10

## 2021-05-10 PROBLEM — I05.9 RHEUMATIC MITRAL VALVE DISEASE: Status: ACTIVE | Noted: 2021-05-10

## 2021-05-10 LAB
ALBUMIN SERPL-MCNC: 3.5 G/DL (ref 3.5–5.2)
ANION GAP SERPL CALCULATED.3IONS-SCNC: 11.5 MMOL/L (ref 5–15)
BUN SERPL-MCNC: 44 MG/DL (ref 6–20)
BUN/CREAT SERPL: 38.9 (ref 7–25)
CALCIUM SPEC-SCNC: 8.7 MG/DL (ref 8.6–10.5)
CHLORIDE SERPL-SCNC: 98 MMOL/L (ref 98–107)
CO2 SERPL-SCNC: 25.5 MMOL/L (ref 22–29)
CREAT SERPL-MCNC: 1.13 MG/DL (ref 0.57–1)
DEPRECATED RDW RBC AUTO: 41.8 FL (ref 37–54)
ERYTHROCYTE [DISTWIDTH] IN BLOOD BY AUTOMATED COUNT: 13 % (ref 12.3–15.4)
GFR SERPL CREATININE-BSD FRML MDRD: 51 ML/MIN/1.73
GLUCOSE BLDC GLUCOMTR-MCNC: 127 MG/DL (ref 70–130)
GLUCOSE BLDC GLUCOMTR-MCNC: 152 MG/DL (ref 70–130)
GLUCOSE BLDC GLUCOMTR-MCNC: 174 MG/DL (ref 70–130)
GLUCOSE BLDC GLUCOMTR-MCNC: 183 MG/DL (ref 70–130)
GLUCOSE SERPL-MCNC: 171 MG/DL (ref 65–99)
HCT VFR BLD AUTO: 38.6 % (ref 34–46.6)
HGB BLD-MCNC: 12.7 G/DL (ref 12–15.9)
INR PPP: 1.22 (ref 0.9–1.1)
MCH RBC QN AUTO: 29.1 PG (ref 26.6–33)
MCHC RBC AUTO-ENTMCNC: 32.9 G/DL (ref 31.5–35.7)
MCV RBC AUTO: 88.3 FL (ref 79–97)
PHOSPHATE SERPL-MCNC: 4.7 MG/DL (ref 2.5–4.5)
PLATELET # BLD AUTO: 166 10*3/MM3 (ref 140–450)
PMV BLD AUTO: 9.1 FL (ref 6–12)
POTASSIUM SERPL-SCNC: 4.4 MMOL/L (ref 3.5–5.2)
PROTHROMBIN TIME: 15.2 SECONDS (ref 11.7–14.2)
RBC # BLD AUTO: 4.37 10*6/MM3 (ref 3.77–5.28)
SODIUM SERPL-SCNC: 135 MMOL/L (ref 136–145)
WBC # BLD AUTO: 10.37 10*3/MM3 (ref 3.4–10.8)

## 2021-05-10 PROCEDURE — 80069 RENAL FUNCTION PANEL: CPT | Performed by: INTERNAL MEDICINE

## 2021-05-10 PROCEDURE — 63710000001 INSULIN LISPRO (HUMAN) PER 5 UNITS: Performed by: NURSE PRACTITIONER

## 2021-05-10 PROCEDURE — 85027 COMPLETE CBC AUTOMATED: CPT | Performed by: THORACIC SURGERY (CARDIOTHORACIC VASCULAR SURGERY)

## 2021-05-10 PROCEDURE — 85610 PROTHROMBIN TIME: CPT | Performed by: NURSE PRACTITIONER

## 2021-05-10 PROCEDURE — 25010000002 ENOXAPARIN PER 10 MG: Performed by: NURSE PRACTITIONER

## 2021-05-10 PROCEDURE — 99233 SBSQ HOSP IP/OBS HIGH 50: CPT | Performed by: INTERNAL MEDICINE

## 2021-05-10 PROCEDURE — 99231 SBSQ HOSP IP/OBS SF/LOW 25: CPT | Performed by: INTERNAL MEDICINE

## 2021-05-10 PROCEDURE — 82962 GLUCOSE BLOOD TEST: CPT

## 2021-05-10 PROCEDURE — 97530 THERAPEUTIC ACTIVITIES: CPT

## 2021-05-10 RX ORDER — BISACODYL 5 MG/1
10 TABLET, DELAYED RELEASE ORAL DAILY PRN
Status: DISCONTINUED | OUTPATIENT
Start: 2021-05-10 | End: 2021-05-11 | Stop reason: HOSPADM

## 2021-05-10 RX ORDER — HYDROCODONE BITARTRATE AND ACETAMINOPHEN 5; 325 MG/1; MG/1
1 TABLET ORAL EVERY 4 HOURS PRN
Qty: 42 TABLET | Refills: 0 | Status: SHIPPED | OUTPATIENT
Start: 2021-05-10 | End: 2021-05-17

## 2021-05-10 RX ORDER — CYCLOBENZAPRINE HCL 5 MG
5 TABLET ORAL EVERY 8 HOURS PRN
Qty: 60 TABLET | Refills: 0 | Status: SHIPPED | OUTPATIENT
Start: 2021-05-10

## 2021-05-10 RX ADMIN — GUAIFENESIN 1200 MG: 600 TABLET, EXTENDED RELEASE ORAL at 08:08

## 2021-05-10 RX ADMIN — INSULIN LISPRO 3 UNITS: 100 INJECTION, SOLUTION INTRAVENOUS; SUBCUTANEOUS at 11:16

## 2021-05-10 RX ADMIN — LINAGLIPTIN 5 MG: 5 TABLET, FILM COATED ORAL at 08:08

## 2021-05-10 RX ADMIN — METOPROLOL TARTRATE 12.5 MG: 25 TABLET, FILM COATED ORAL at 20:29

## 2021-05-10 RX ADMIN — INSULIN LISPRO 3 UNITS: 100 INJECTION, SOLUTION INTRAVENOUS; SUBCUTANEOUS at 20:28

## 2021-05-10 RX ADMIN — BISACODYL 10 MG: 5 TABLET ORAL at 11:15

## 2021-05-10 RX ADMIN — METOPROLOL TARTRATE 12.5 MG: 25 TABLET, FILM COATED ORAL at 08:08

## 2021-05-10 RX ADMIN — FUROSEMIDE 40 MG: 40 TABLET ORAL at 08:08

## 2021-05-10 RX ADMIN — HYDROCODONE BITARTRATE AND ACETAMINOPHEN 2 TABLET: 5; 325 TABLET ORAL at 00:08

## 2021-05-10 RX ADMIN — HYDROCODONE BITARTRATE AND ACETAMINOPHEN 2 TABLET: 5; 325 TABLET ORAL at 13:58

## 2021-05-10 RX ADMIN — DOCUSATE SODIUM 50MG AND SENNOSIDES 8.6MG 2 TABLET: 8.6; 5 TABLET, FILM COATED ORAL at 20:28

## 2021-05-10 RX ADMIN — MUPIROCIN 1 APPLICATION: 20 OINTMENT TOPICAL at 08:08

## 2021-05-10 RX ADMIN — ASPIRIN 81 MG: 81 TABLET, COATED ORAL at 08:08

## 2021-05-10 RX ADMIN — INSULIN LISPRO 3 UNITS: 100 INJECTION, SOLUTION INTRAVENOUS; SUBCUTANEOUS at 06:47

## 2021-05-10 RX ADMIN — WARFARIN 4 MG: 4 TABLET ORAL at 17:33

## 2021-05-10 RX ADMIN — GUAIFENESIN 1200 MG: 600 TABLET, EXTENDED RELEASE ORAL at 20:28

## 2021-05-10 RX ADMIN — ENOXAPARIN SODIUM 40 MG: 40 INJECTION SUBCUTANEOUS at 17:31

## 2021-05-10 RX ADMIN — CHLORHEXIDINE GLUCONATE 15 ML: 1.2 RINSE ORAL at 08:08

## 2021-05-10 RX ADMIN — POLYETHYLENE GLYCOL 3350 17 G: 17 POWDER, FOR SOLUTION ORAL at 08:08

## 2021-05-10 RX ADMIN — PANTOPRAZOLE SODIUM 40 MG: 40 TABLET, DELAYED RELEASE ORAL at 06:47

## 2021-05-10 NOTE — PROGRESS NOTES
"   LOS: 11 days   Patient Care Team:  Tayo Sanon MD as PCP - General (Family Medicine)    Chief Complaint: AVR/MVR     Interval History: Still no BM, good UOP.       Objective   Vital Signs  Temp:  [97.5 °F (36.4 °C)-97.9 °F (36.6 °C)] 97.5 °F (36.4 °C)  Heart Rate:  [66-81] 76  Resp:  [18] 18  BP: (112-135)/(62-74) 122/71    Intake/Output Summary (Last 24 hours) at 5/10/2021 1101  Last data filed at 5/10/2021 0427  Gross per 24 hour   Intake 390 ml   Output 3750 ml   Net -3360 ml       Last Weight and Admission Weight        05/10/21  0500   Weight: 131 kg (288 lb 14.4 oz)     Flowsheet Rows      First Filed Value   Admission Height  162.6 cm (64.02\") Documented at 2021   Admission Weight  129 kg (284 lb) Documented at 2021                  Physical Exam  Vitals reviewed.   Constitutional:       Comments: obese   HENT:      Head: Normocephalic.   Eyes:      Conjunctiva/sclera: Conjunctivae normal.   Neck:      Comments: Cannot assess JVD due to body habitus  Cardiovascular:      Rate and Rhythm: Normal rate and regular rhythm.      Pulses: Normal pulses.      Heart sounds: Heart sounds are distant. Murmur heard.   Systolic murmur is present with a grade of 1/6.     Pulmonary:      Effort: Pulmonary effort is normal.      Breath sounds: Normal breath sounds.   Abdominal:      Palpations: Abdomen is soft.      Tenderness: There is no abdominal tenderness.      Comments: Cannot feel organs or aorta   Musculoskeletal:         General: Normal range of motion.      Cervical back: Normal range of motion.      Right lower le+ Edema present.      Left lower le+ Edema present.   Skin:     General: Skin is warm and dry.      Findings: No erythema.   Neurological:      Mental Status: She is alert and oriented to person, place, and time.      Cranial Nerves: No cranial nerve deficit.   Psychiatric:         Behavior: Behavior normal.         Thought Content: Thought content normal.         " Judgment: Judgment normal.         Results Review:      Results from last 7 days   Lab Units 05/10/21  0318 05/09/21  0322 05/08/21  0344   SODIUM mmol/L 135* 130* 131*   POTASSIUM mmol/L 4.4 3.9 4.2   CHLORIDE mmol/L 98 98 99   CO2 mmol/L 25.5 14.7* 19.1*   BUN mg/dL 44* 51* 59*   CREATININE mg/dL 1.13* 0.98 1.16*   GLUCOSE mg/dL 171* 151* 213*   CALCIUM mg/dL 8.7 8.4* 8.3*         Results from last 7 days   Lab Units 05/10/21  0318 05/07/21  0414 05/06/21  0338   WBC 10*3/mm3 10.37 9.67 13.60*   HEMOGLOBIN g/dL 12.7 11.9* 13.0   HEMATOCRIT % 38.6 36.3 39.0   PLATELETS 10*3/mm3 166 114* 97*     Results from last 7 days   Lab Units 05/10/21  0318 05/09/21  0322 05/08/21  0344 05/03/21  1534   INR  1.22* 1.18* 1.28* 1.49*   APTT seconds  --   --   --  35.3         Results from last 7 days   Lab Units 05/04/21  0254   MAGNESIUM mg/dL 2.7*           I reviewed the patient's new clinical results.  I personally viewed and interpreted the patient's EKG/Telemetry data        Medication Review:   aspirin, 81 mg, Oral, Daily  chlorhexidine, 15 mL, Mouth/Throat, Q12H  enoxaparin, 40 mg, Subcutaneous, Daily  furosemide, 40 mg, Oral, Daily  guaiFENesin, 1,200 mg, Oral, Q12H  insulin lispro, 0-14 Units, Subcutaneous, 4x Daily With Meals & Nightly  linagliptin, 5 mg, Oral, Daily  metoprolol tartrate, 12.5 mg, Oral, Q12H  mupirocin, , Each Nare, BID  pantoprazole, 40 mg, Oral, QAM  polyethylene glycol, 17 g, Oral, Daily  senna-docusate sodium, 2 tablet, Oral, Nightly  warfarin, 4 mg, Oral, Daily      sodium chloride, 30 mL/hr, Last Rate: Stopped (05/04/21 0500)      Assessment/Plan     1. Endocardial fibroelastomas -- POD#7 surgical removal    2. Rheumatic aortic and mitral valve disease -- POD#7 21mm Inspiris bovine pericardial AVR and 29mm [place] Epic porcine MVR and neocord x 2 to reconstruct the anterior papillary muscules; normal LVEF/cors    3. Morbid obesity    4. DM2    5. ELY    6. HTN    7. Hyperlipidemia    8. SOURAV,  improved    9. Constipation    She's still quite swollen but diuresing well.  SCr is up a bit today. I asked her to elevate her legs.  Continue oral furosemide.  Give dulcolax for BM.      Will continue warfarin for now, but will check with surgery to see how long she will need OAC in the long run.    She doesn't feel quite up to discharge today; likely home tomorrow.     Fer Valle MD  05/10/21  11:01 EDT

## 2021-05-10 NOTE — THERAPY DISCHARGE NOTE
Patient Name: Colette Veronica  : 1975    MRN: [id number]                              Today's Date: 5/10/2021       Admit Date: 2021    Visit Dx:     ICD-10-CM ICD-9-CM   1. Nonrheumatic aortic valve stenosis  I35.0 424.1   2. S/P MVR (mitral valve replacement)  Z95.2 V43.3   3. S/P AVR (aortic valve replacement)  Z95.2 V43.3     Patient Active Problem List   Diagnosis   • Aortic stenosis   • Rheumatic mitral valve disease   • Endocardial fibroelastosis (CMS/HCC)   • Type 2 diabetes mellitus (CMS/HCC)   • Hypertension   • Hyperlipidemia   • ELY (nonalcoholic steatohepatitis)   • SOURAV (acute kidney injury) (CMS/HCC)     Past Medical History:   Diagnosis Date   • Asthma    • Bell's palsy    • COPD (chronic obstructive pulmonary disease) (CMS/HCC)    • GERD (gastroesophageal reflux disease)    • Hyperlipidemia    • Hypertension    • Morbid obesity (CMS/HCC)    • Type 2 diabetes mellitus (CMS/HCC)      Past Surgical History:   Procedure Laterality Date   • AORTIC VALVE REPAIR/REPLACEMENT MITRAL VALVE REPAIR/REPLACEMENT N/A 5/3/2021    Procedure: JACKELYN STERNOTOMY AORTIC VALVE REPLACEMENT, MITRAL VALVE REPLACEMENT, EXCISION OF BENIGN PAPILLARY FIBROELASTOMAS  AND PRP;  Surgeon: Jr Daniel Noguera MD;  Location: [facility];  Service: Cardiothoracic;  Laterality: N/A;   • CARDIAC CATHETERIZATION N/A 2021    Procedure: Left Heart Cath NO LV DO NOT CROSS AORTIC VALVE;  Surgeon: Pedro Conner MD;  Location: [facility] CATH INVASIVE LOCATION;  Service: Cardiovascular;  Laterality: N/A;   • CARDIAC CATHETERIZATION N/A 2021    Procedure: Coronary angiography;  Surgeon: Pedro Conner MD;  Location: [facility] CATH INVASIVE LOCATION;  Service: Cardiovascular;  Laterality: N/A;   • HAND SURGERY Right      General Information     Row Name 05/10/21 1135          Physical Therapy Time and Intention    Document Type  therapy note (daily note);discharge evaluation/summary  -LB     Mode of Treatment   individual therapy;physical therapy  -LB     Row Name 05/10/21 1135          General Information    Existing Precautions/Restrictions  cardiac;sternal  -LB     Row Name 05/10/21 1135          Cognition    Orientation Status (Cognition)  oriented x 4  -LB       User Key  (r) = Recorded By, (t) = Taken By, (c) = Cosigned By    Initials Name Provider Type    Dedra Herrera, PT Physical Therapist        Mobility     Row Name 05/10/21 1135          Sit-Stand Transfer    Sit-Stand [place] (Transfers)  independent  -LB     Row Name 05/10/21 1135          Gait/Stairs (Locomotion)    [place] Level (Gait)  independent  -LB     Distance in Feet (Gait)  150  -LB     Deviations/Abnormal Patterns (Gait)  gait speed decreased;stride length decreased  -LB     [place] Level (Stairs)  stand by assist  -LB     Number of Steps (Stairs)  3; pt declined more steps due to chronic knee pain  -LB     Ascending Technique (Stairs)  step-to-step  -LB     Descending Technique (Stairs)  step-to-step  -LB       User Key  (r) = Recorded By, (t) = Taken By, (c) = Cosigned By    Initials Name Provider Type    Dedra Herrera, PT Physical Therapist        Obj/Interventions     Row Name 05/10/21 1136          Motor Skills    Therapeutic Exercise  -- 10 reps of cardiac protocol  -LB       User Key  (r) = Recorded By, (t) = Taken By, (c) = Cosigned By    Initials Name Provider Type    Dedra Herrera, PT Physical Therapist        Goals/Plan     Row Name 05/10/21 1140          Bed Mobility Goal 1 (PT)    Activity/Assistive Device (Bed Mobility Goal 1, PT)  bed mobility activities, all  -LB     [place] Level/Cues Needed (Bed Mobility Goal 1, PT)  standby assist  -LB     Progress/Outcomes (Bed Mobility Goal 1, PT)  goal met per pt report  -LB     Row Name 05/10/21 1140          Transfer Goal 1 (PT)    Progress/Outcome (Transfer Goal 1, PT)  goal met  -LB     Row Name 05/10/21 1140          Gait Training Goal 1 (PT)     Progress/Outcome (Gait Training Goal 1, PT)  goal partially met pt was not able to tolerate 300 ft  -LB     Row Name 05/10/21 1140          Stairs Goal 1 (PT)    [place] Level/Cues Needed (Stairs Goal 1, PT)  standby assist  -LB     Number of Stairs (Stairs Goal 1, PT)  3  -LB     Progress/Outcome (Stairs Goal 1, PT)  goal partially met  -LB       User Key  (r) = Recorded By, (t) = Taken By, (c) = Cosigned By    Initials Name Provider Type    Dedra Herrera, PT Physical Therapist        Clinical Impression     Row Name 05/10/21 1138          Pain Scale: Numbers Pre/Post-Treatment    Pretreatment Pain Rating  1/10  -LB     Posttreatment Pain Rating  1/10  -LB     Row Name 05/10/21 1138          Plan of Care Review    Outcome Summary  The patient continues to improve with all mobility but does complain of SOA with gait/steps.  However, vital signs were stable on RA.  Pt is hoping to return home tomorrow  -LB     Row Name 05/10/21 1138          Vital Signs    Pre Systolic BP Rehab  122  -LB     Pre Treatment Diastolic BP  71  -LB     Post Systolic BP Rehab  139  -LB     Post Treatment Diastolic BP  73  -LB     Post SpO2 (%)  97  -LB     O2 Delivery Post Treatment  room air  -LB     Post Patient Position  Sitting  -LB     Row Name 05/10/21 1138          Positioning and Restraints    Pre-Treatment Position  sitting in chair/recliner  -LB     Post Treatment Position  chair  -LB     In Chair  sitting;call light within reach  -LB       User Key  (r) = Recorded By, (t) = Taken By, (c) = Cosigned By    Initials Name Provider Type    Dedra Herrera PT Physical Therapist        Outcome Measures     Row Name 05/10/21 1142          How much help from another person do you currently need...    Turning from your back to your side while in flat bed without using bedrails?  4  -LB     Moving from lying on back to sitting on the side of a flat bed without bedrails?  4  -LB     Moving to and from a bed to a chair (including a  wheelchair)?  4  -LB     Standing up from a chair using your arms (e.g., wheelchair, bedside chair)?  4  -LB     Climbing 3-5 steps with a railing?  3  -LB     To walk in hospital room?  4  -LB     AM-PAC 6 Clicks Score (PT)  23  -LB       User Key  (r) = Recorded By, (t) = Taken By, (c) = Cosigned By    Initials Name Provider Type    LB Dedra Varghese, PT Physical Therapist        Physical Therapy Education                 Title: PT OT SLP Therapies (Resolved)     Topic: Physical Therapy (Resolved)     Point: Mobility training (Resolved)     Learning Progress Summary           Patient Acceptance, E,TB, VU,DU by  at 5/10/2021 1142    Acceptance, E,TB, VU,NR by CS at 5/8/2021 1011    Acceptance, E,TB, VU by LB at 5/7/2021 1215    Acceptance, E,TB, VU,NR by LB at 5/6/2021 1506    Acceptance, E, VU by EM at 5/5/2021 0949    Acceptance, E,D, VU,NR by MS at 5/4/2021 1046                   Point: Home exercise program (Resolved)     Learning Progress Summary           Patient Acceptance, E,TB, VU,NR by CS at 5/8/2021 1011    Acceptance, E, VU by EM at 5/5/2021 0949    Acceptance, E,D, VU,NR by MS at 5/4/2021 1046                   Point: Body mechanics (Resolved)     Learning Progress Summary           Patient Acceptance, E,TB, VU,NR by CS at 5/8/2021 1011    Acceptance, E,D, VU,NR by MS at 5/4/2021 1046                   Point: Precautions (Resolved)     Learning Progress Summary           Patient Acceptance, E,TB, VU,NR by CS at 5/8/2021 1011    Acceptance, E,D, VU,NR by MS at 5/4/2021 1046                               User Key     Initials Effective Dates Name Provider Type Discipline    LB 04/03/18 -  Dedra Varghese, PT Physical Therapist PT    EM 04/03/18 -  Tasneem Yost, PT Physical Therapist PT    MS 04/03/18 -  Paras Matthews, PT Physical Therapist PT    CS 05/14/18 -  Alan Olivares, PT Physical Therapist PT              PT Recommendation and Plan     Plan of Care Reviewed With: patient  Outcome  Summary: The patient continues to improve with all mobility but does complain of SOA with gait/steps.  However, vital signs were stable on RA.  Pt is hoping to return home tomorrow     Time Calculation:   PT Charges     Row Name 05/10/21 1143             Time Calculation    Start Time  1039  -LB      Stop Time  1053  -LB      Time Calculation (min)  14 min  -LB      PT Received On  05/10/21  -LB        User Key  (r) = Recorded By, (t) = Taken By, (c) = Cosigned By    Initials Name Provider Type    Dedra Herrera, PT Physical Therapist        Therapy Charges for Today     Code Description Service Date Service Provider Modifiers Qty    [id number]  PT THERAPEUTIC ACT EA 15 MIN 5/10/2021 Dedra Varghese PT GP 1          PT G-Codes  Outcome Measure Options: AM-PAC 6 Clicks Basic Mobility (PT)  AM-PAC 6 Clicks Score (PT): 23         Dedra Varghese PT  5/10/2021

## 2021-05-10 NOTE — PROGRESS NOTES
LOS: 11 days   Patient Care Team:  Tayo Sanon MD as PCP - General (Family Medicine)    Chief Complaint:post op    Subjective:  Symptoms:  No shortness of breath or chest pain.    Diet:  No nausea.          Vital Signs  Temp:  [97.5 °F (36.4 °C)-97.9 °F (36.6 °C)] 97.5 °F (36.4 °C)  Heart Rate:  [66-81] 76  Resp:  [18] 18  BP: (112-135)/(62-74) 122/71  Body mass index is 49.56 kg/m².    Intake/Output Summary (Last 24 hours) at 5/10/2021 0836  Last data filed at 5/10/2021 0427  Gross per 24 hour   Intake 390 ml   Output 3750 ml   Net -3360 ml     No intake/output data recorded.    Chest tube drainage last 8 hours         05/08/21  0654 05/09/21  0529 05/10/21  0500   Weight: 132 kg (291 lb) 132 kg (291 lb 1.6 oz) 131 kg (288 lb 14.4 oz)         Objective    Results Review:        WBC WBC   Date Value Ref Range Status   05/10/2021 10.37 3.40 - 10.80 10*3/mm3 Final      HGB Hemoglobin   Date Value Ref Range Status   05/10/2021 12.7 12.0 - 15.9 g/dL Final      HCT Hematocrit   Date Value Ref Range Status   05/10/2021 38.6 34.0 - 46.6 % Final      Platelets Platelets   Date Value Ref Range Status   05/10/2021 166 140 - 450 10*3/mm3 Final        PT/INR:    Protime   Date Value Ref Range Status   05/10/2021 15.2 (H) 11.7 - 14.2 Seconds Final   05/09/2021 14.8 (H) 11.7 - 14.2 Seconds Final   05/08/2021 15.8 (H) 11.7 - 14.2 Seconds Final   /  INR   Date Value Ref Range Status   05/10/2021 1.22 (H) 0.90 - 1.10 Final   05/09/2021 1.18 (H) 0.90 - 1.10 Final   05/08/2021 1.28 (H) 0.90 - 1.10 Final       Sodium Sodium   Date Value Ref Range Status   05/10/2021 135 (L) 136 - 145 mmol/L Final   05/09/2021 130 (L) 136 - 145 mmol/L Final   05/08/2021 131 (L) 136 - 145 mmol/L Final      Potassium Potassium   Date Value Ref Range Status   05/10/2021 4.4 3.5 - 5.2 mmol/L Final   05/09/2021 3.9 3.5 - 5.2 mmol/L Final   05/08/2021 4.2 3.5 - 5.2 mmol/L Final      Chloride Chloride   Date Value Ref Range Status   05/10/2021 98 98 -  107 mmol/L Final   05/09/2021 98 98 - 107 mmol/L Final   05/08/2021 99 98 - 107 mmol/L Final      Bicarbonate CO2   Date Value Ref Range Status   05/10/2021 25.5 22.0 - 29.0 mmol/L Final   05/09/2021 14.7 (L) 22.0 - 29.0 mmol/L Final   05/08/2021 19.1 (L) 22.0 - 29.0 mmol/L Final      BUN BUN   Date Value Ref Range Status   05/10/2021 44 (H) 6 - 20 mg/dL Final   05/09/2021 51 (H) 6 - 20 mg/dL Final   05/08/2021 59 (H) 6 - 20 mg/dL Final      Creatinine Creatinine   Date Value Ref Range Status   05/10/2021 1.13 (H) 0.57 - 1.00 mg/dL Final   05/09/2021 0.98 0.57 - 1.00 mg/dL Final   05/08/2021 1.16 (H) 0.57 - 1.00 mg/dL Final      Calcium Calcium   Date Value Ref Range Status   05/10/2021 8.7 8.6 - 10.5 mg/dL Final   05/09/2021 8.4 (L) 8.6 - 10.5 mg/dL Final   05/08/2021 8.3 (L) 8.6 - 10.5 mg/dL Final      Magnesium No results found for: MG       aspirin, 81 mg, Oral, Daily  chlorhexidine, 15 mL, Mouth/Throat, Q12H  enoxaparin, 40 mg, Subcutaneous, Daily  furosemide, 40 mg, Oral, Daily  guaiFENesin, 1,200 mg, Oral, Q12H  insulin lispro, 0-14 Units, Subcutaneous, 4x Daily With Meals & Nightly  linagliptin, 5 mg, Oral, Daily  metoprolol tartrate, 12.5 mg, Oral, Q12H  mupirocin, , Each Nare, BID  pantoprazole, 40 mg, Oral, QAM  polyethylene glycol, 17 g, Oral, Daily  senna-docusate sodium, 2 tablet, Oral, Nightly  warfarin, 4 mg, Oral, Daily      sodium chloride, 30 mL/hr, Last Rate: Stopped (05/04/21 0500)            Patient Active Problem List   Diagnosis Code   • Aortic stenosis I35.0   • Nonrheumatic aortic valve stenosis I35.0       Assessment & Plan       -Critical aortic stenosis-- s/p AVR (tissue), MVR, Neocords x2 to reconstruct the anterior papillary muscles.  Removal of multiple intracardiac tumors POD#7 [place]  -Myxomatous multiple mobile mass   -Current tobacco abuse-- encourage cessation   -Morbid obesity   -hx of PE   -bell's palsy   -cirrhotic liver on CT scan  -leukocytosis- probable reactive  -TCP--  plt count 97     Looks good this morning  Slight increase in her creatinine this morning  Encourage pulmonary toilet  Continue coumadin loading   Okay from our standpoint for discharge home with home health if Okay with all  Home health orders placed  Follow up with me on 6/10 at 1pm  Routine care      JAUN Orlando  05/10/21  08:36 EDT

## 2021-05-10 NOTE — PROGRESS NOTES
[facility]  Inpatient Progress Note    Reason for Follow Up: Cirrhosis suspect Rosario    Subjective     Interval History:   No GI complaints this morning, minimal shortness of air    Current Facility-Administered Medications:   •  acetaminophen (TYLENOL) tablet 650 mg, 650 mg, Oral, Q4H PRN **OR** acetaminophen (TYLENOL) 160 MG/5ML solution 650 mg, 650 mg, Oral, Q4H PRN **OR** acetaminophen (TYLENOL) suppository 650 mg, 650 mg, Rectal, Q4H PRN, Claire Frazier, APRN  •  ALPRAZolam (XANAX) tablet 0.25 mg, 0.25 mg, Oral, Q8H PRN, Claire Frazier, APRN  •  aspirin EC tablet 81 mg, 81 mg, Oral, Daily, Claire Frazier, APRN, 81 mg at 05/10/21 0808  •  bisacodyl (DULCOLAX) EC tablet 10 mg, 10 mg, Oral, Daily PRN, Claire Frazier, APRN, 10 mg at 05/09/21 0834  •  bisacodyl (DULCOLAX) suppository 10 mg, 10 mg, Rectal, Daily PRN, Claire Frazier, APRN, 10 mg at 05/09/21 1658  •  chlorhexidine (PERIDEX) 0.12 % solution 15 mL, 15 mL, Mouth/Throat, Q12H, Claire Frazier, APRN, 15 mL at 05/10/21 0808  •  cyclobenzaprine (FLEXERIL) tablet 5 mg, 5 mg, Oral, Q8H PRN, Claire Frazier APRN, 5 mg at 05/05/21 1503  •  dextrose (D50W) 25 g/ 50mL Intravenous Solution 25 g, 25 g, Intravenous, Q15 Min PRN, Claire Frazier APRN  •  dextrose (GLUTOSE) oral gel 15 g, 15 g, Oral, Q15 Min PRN, Claire Frazier, APRN  •  enoxaparin (LOVENOX) syringe 40 mg, 40 mg, Subcutaneous, Daily, Claire Frazier APRN, 40 mg at 05/09/21 1658  •  furosemide (LASIX) tablet 40 mg, 40 mg, Oral, Daily, Kelvin Sandoval MD, 40 mg at 05/10/21 0808  •  glucagon (human recombinant) (GLUCAGEN DIAGNOSTIC) injection 1 mg, 1 mg, Subcutaneous, Q15 Min PRN, Claire Frazier APRN  •  guaiFENesin (MUCINEX) 12 hr tablet 1,200 mg, 1,200 mg, Oral, Q12H, Claire Frazier APRN, 1,200 mg at 05/10/21 0808  •  HYDROcodone-acetaminophen (NORCO) 5-325 MG per tablet 2 tablet, 2 tablet, Oral, Q4H PRN, Claire Frazier APRN, 2 tablet at  05/10/21 0008  •  insulin lispro (ADMELOG) injection 0-14 Units, 0-14 Units, Subcutaneous, 4x Daily With Meals & Nightly, Claire Frazier APRN, 3 Units at 05/10/21 0647  •  ipratropium-albuterol (DUO-NEB) nebulizer solution 3 mL, 3 mL, Nebulization, Q4H PRN, Claire Frazier APRN, 3 mL at 05/07/21 0920  •  linagliptin (TRADJENTA) tablet 5 mg, 5 mg, Oral, Daily, Claire Frazier APRN, 5 mg at 05/10/21 0808  •  magnesium hydroxide (MILK OF MAGNESIA) suspension 2400 mg/10mL 10 mL, 10 mL, Oral, Daily PRN, Claire Frazier APRN  •  metoprolol tartrate (LOPRESSOR) tablet 12.5 mg, 12.5 mg, Oral, Q12H, Claire Frazier APRN, 12.5 mg at 05/10/21 0808  •  morphine injection 1 mg, 1 mg, Intravenous, Q4H PRN **AND** naloxone (NARCAN) injection 0.4 mg, 0.4 mg, Intravenous, Q5 Min PRN, Claire Frazier APRN  •  morphine injection 4 mg, 4 mg, Intravenous, Q30 Min PRN, Claire Frazier APRN, 2 mg at 05/03/21 2259  •  mupirocin (BACTROBAN) 2 % nasal ointment, , Each Nare, BID, Claire Frazier APRN, 1 application at 05/10/21 0808  •  ondansetron (ZOFRAN) injection 4 mg, 4 mg, Intravenous, Q6H PRN, Claire Frazier APRN  •  [COMPLETED] pantoprazole (PROTONIX) injection 40 mg, 40 mg, Intravenous, Once, 40 mg at 05/03/21 1546 **FOLLOWED BY** pantoprazole (PROTONIX) EC tablet 40 mg, 40 mg, Oral, QAM, Claire Frazier APRN, 40 mg at 05/10/21 0647  •  polyethylene glycol (MIRALAX) packet 17 g, 17 g, Oral, Daily, Claire Frazier, APRN, 17 g at 05/10/21 0808  •  potassium chloride (K-DUR,KLOR-CON) ER tablet 40 mEq, 40 mEq, Oral, PRN **OR** potassium chloride (KLOR-CON) packet 40 mEq, 40 mEq, Oral, PRN, Claire Frazier, APRN  •  potassium chloride 10 mEq in 100 mL IVPB, 10 mEq, Intravenous, Q1H PRN **OR** potassium chloride 10 mEq in 100 mL IVPB, 10 mEq, Intravenous, Q1H PRN, Claire Frazier, APRN  •  potassium chloride 20 mEq in 50 mL IVPB, 20 mEq, Intravenous, Q1H PRN, Claire Frazier, APRN  •  potassium chloride 20 mEq in  50 mL IVPB, 20 mEq, Intravenous, Q1H PRN, Claire Frazier APRN  •  potassium chloride 20 mEq in 50 mL IVPB, 20 mEq, Intravenous, Q1H PRN, Claire Frazier APRN  •  sennosides-docusate (PERICOLACE) 8.6-50 MG per tablet 2 tablet, 2 tablet, Oral, Nightly, Claire Frazier APRN, 2 tablet at 05/09/21 2102  •  sodium chloride 0.9 % infusion, 30 mL/hr, Intravenous, Continuous PRN, Claire Fraizer APRN, Stopped at 05/04/21 0500  •  traMADol (ULTRAM) tablet 50 mg, 50 mg, Oral, Q6H PRN, Claire Frazier APRN, 50 mg at 05/05/21 1725  •  warfarin (COUMADIN) tablet 4 mg, 4 mg, Oral, Daily, Laura Means MD, 4 mg at 05/09/21 1658  Review of Systems:    There is weakness of fatigue all other systems reviewed and negative    Objective     Vital Signs  Temp:  [97.5 °F (36.4 °C)-97.9 °F (36.6 °C)] 97.5 °F (36.4 °C)  Heart Rate:  [66-81] 76  Resp:  [18] 18  BP: (112-135)/(62-74) 122/71  Body mass index is 49.59 kg/m².    Intake/Output Summary (Last 24 hours) at 5/10/2021 0924  Last data filed at 5/10/2021 0427  Gross per 24 hour   Intake 390 ml   Output 3750 ml   Net -3360 ml     No intake/output data recorded.     Physical Exam:   General: patient awake, alert and cooperative   Eyes: Normal lids and lashes, no scleral icterus   Neck: supple, normal ROM   Skin: warm and dry, not jaundiced   Cardiovascular: regular rhythm and rate, no murmurs auscultated   Pulm: clear to auscultation bilaterally, regular and unlabored   Abdomen: soft, nontender, nondistended; normal bowel sounds   Extremities: no rash or edema   Psychiatric: Normal mood and behavior; memory intact     Results Review:     I reviewed the patient's new clinical results.    Results from last 7 days   Lab Units 05/10/21  0316 05/07/21  0414 05/06/21  0338   WBC 10*3/mm3 10.37 9.67 13.60*   HEMOGLOBIN g/dL 12.7 11.9* 13.0   HEMATOCRIT % 38.6 36.3 39.0   PLATELETS 10*3/mm3 166 114* 97*     Results from last 7 days   Lab Units 05/10/21  0318 05/09/21  0322  05/08/21  0344 05/06/21  1620 05/06/21  1209 05/05/21  0421   SODIUM mmol/L 135* 130* 131*   < > 130* 132*   POTASSIUM mmol/L 4.4 3.9 4.2  --  4.7 5.2   CHLORIDE mmol/L 98 98 99   < > 96* 99   CO2 mmol/L 25.5 14.7* 19.1*   < > 17.9* 18.6*   BUN mg/dL 44* 51* 59*   < > 59* 38*   CREATININE mg/dL 1.13* 0.98 1.16*   < > 2.10* 1.85*   CALCIUM mg/dL 8.7 8.4* 8.3*   < > 8.8 8.4*   BILIRUBIN mg/dL  --   --   --   --  0.7 0.9   ALK PHOS U/L  --   --   --   --  74 63   ALT (SGPT) U/L  --   --   --   --  12 13   AST (SGOT) U/L  --   --   --   --  22 38*   GLUCOSE mg/dL 171* 151* 213*   < > 208* 179*    < > = values in this interval not displayed.     Results from last 7 days   Lab Units 05/10/21  0318 05/09/21  0322 05/08/21  0344   INR  1.22* 1.18* 1.28*     No results found for: LIPASE    Radiology:  XR Chest 1 View   Final Result         Electronically signed by Delphine Worthy MD on 05-08-21 at 0553      XR Chest PA & Lateral   Final Result   Bibasilar atelectasis/infiltrate, small bilateral pleural   effusions and interstitial prominence in the perihilar regions   bilaterally all of which were present previously. The left lower lobe   atelectasis/infiltrate is slightly improved.       This report was finalized on 5/7/2021 2:12 PM by Dr. Alvin Fournier M.D.          XR Chest PA & Lateral   Final Result   1. The patient is postop cardiac surgery with aortic and mitral valve   replacement. There is an enlarged cardiomediastinal silhouette and mild   vascular engorgement but no edema. There are small bilateral effusions   and bibasilar atelectasis. No additional active disease is seen in the   chest.       This report was finalized on 5/6/2021 1:41 PM by Dr. Davide Hernández M.D.          XR Chest 1 View   Final Result   Chest tube removal. No pneumothorax.       This report was finalized on 5/5/2021 3:32 PM by Dr. Sanchez Velasquez M.D.          XR Chest 1 View   Final Result   Increasing bibasilar atelectasis.       This  report was finalized on 5/5/2021 5:44 AM by Dr. Cassidy Parra M.D.          XR Chest 1 View   Final Result   Improved vascular congestion, although there is some worsening   atelectasis at the left lung base       This report was finalized on 5/4/2021 4:55 AM by Dr. Cassidy Parra M.D.          XR Chest 1 View   Final Result   Postsurgical changes.       This report was finalized on 5/3/2021 4:41 PM by Dr. Sanchez Velasquez M.D.          XR Chest Post CVA Port   Final Result      MRI Cardiac Function Complete With & Without Morphology   Final Result      CT Angiogram Chest   Final Result   1. The study is limited by poor arterial contrast bolus   2. There is no evidence of aortic aneurysm   3. Extensive aortic valvular calcifications   4. Mildly enlarged mediastinal and hilar lymph nodes are nonspecific and   may be reactive. Would suggest a follow-up chest CT in 6 months   5. Areas of interstitial septal thickening in the lungs are nonspecific   and may reflect interstitial edema.   6. No evidence for pleural effusion or airspace consolidation   7. Cirrhotic liver   8. Mild splenomegaly       Radiation dose reduction techniques were utilized, including automated   exposure control and exposure modulation based on body size.       This report was finalized on 4/28/2021 7:34 PM by Dr. Catalino Francis M.D.          CT Angiogram Abdomen Pelvis   Final Result   1. The study is limited by poor arterial contrast bolus   2. There is no evidence of aortic aneurysm   3. Extensive aortic valvular calcifications   4. Mildly enlarged mediastinal and hilar lymph nodes are nonspecific and   may be reactive. Would suggest a follow-up chest CT in 6 months   5. Areas of interstitial septal thickening in the lungs are nonspecific   and may reflect interstitial edema.   6. No evidence for pleural effusion or airspace consolidation   7. Cirrhotic liver   8. Mild splenomegaly       Radiation dose reduction techniques  were utilized, including automated   exposure control and exposure modulation based on body size.       This report was finalized on 4/28/2021 7:34 PM by Dr. Catalino Francis M.D.              Assessment/Plan     Patient Active Problem List   Diagnosis   • Aortic stenosis   • Nonrheumatic aortic valve stenosis        Assessment:  1. CT scan findings of cirrhosis and mild splenomegaly, work-up negative, likely associated with ELY  2. Post AVR, MVR removal of left ventricular wall lesions     These problems are new to me  Plan:  · Alpha 1 antitrypsin levels pending, most likely to result soon, its been pending since April 30, she does not need to remain in the hospital for this to be resulted  · GI okay with discharge when all agree  I discussed the patients findings and my recommendations with patient and nursing staff.    Panfilo Hein MD

## 2021-05-10 NOTE — PLAN OF CARE
Goal Outcome Evaluation:  Plan of Care Reviewed With: patient     Outcome Summary: The patient continues to improve with all mobility but does complain of SOA with gait/steps.  However, vital signs were stable on RA.  Pt is hoping to return home tomorrow. Patient was wearing a face mask during this therapy encounter. Therapist used appropriate personal protective equipment including eye protection, mask, and gloves.  Mask used was standard procedure mask. Appropriate PPE was worn during the entire therapy session. Hand hygiene was completed before and after therapy session. Patient is not in enhanced droplet precautions.

## 2021-05-10 NOTE — DISCHARGE PLACEMENT REQUEST
"Ede Veronica (46 y.o. Female)     Date of Birth Social Security Number Address Home Phone MRN    1975  [address] [place] [address] [phone] [id number]    Tenriism Marital Status          Unknown        Admission Date Admission Type Admitting Provider Attending Provider Department, Room/Bed    4/28/21 Urgent Bandar Thao MD Meriwether, Louis G, MD [facility] CARDIOVASC UNIT, 2223/1    Discharge Date Discharge Disposition Discharge Destination                       Attending Provider: Bandar Thao MD    Allergies: Penicillins, Red Dye    Isolation: None   Infection: None   Code Status: CPR    Ht: 162.6 cm (64\")   Wt: 131 kg (288 lb 14.4 oz)    Admission Cmt: None   Principal Problem: Nonrheumatic aortic valve stenosis [I35.0] More...                 Active Insurance as of 4/28/2021     Primary Coverage     Payor Plan Insurance Group Employer/Plan Group    [place] [id number]     Payor Plan Address Payor Plan Phone Number Payor Plan Fax Number Effective Dates    [employer] [address] [address] [phone]  1/1/2021 - None Entered    [facility] [id number]       Subscriber Name Subscriber Birth Date Member ID       EDE VERONICA 1975 [id number]                 Emergency Contacts      (Rel.) Home Phone Work Phone Mobile Phone    LujanBrie campos (Mother) [phone]    Davide Mckeon (Significant Other) [phone] -- [phone]              "

## 2021-05-10 NOTE — PROGRESS NOTES
"Adult Nutrition  Assessment/PES    Patient Name:  Colette Veronica  YOB: 1975  MRN: [id number]  Admit Date:  4/28/2021    Assessment Date:  5/10/2021    Comments:  Assess for LOS >10 days. Tolerating diet, intake varies 25-75%. POD #7 valve surgery & removal of cardiac tumors. BMI 49. Will continue to monitor.     Reason for Assessment     Row Name 05/10/21 0913          Reason for Assessment    Reason For Assessment  identified at risk by screening criteria     Diagnosis  cardiac disease;renal disease;liver disease Critical aortic stenosis; cirrhotic liver, SOURAV     Identified At Risk by Screening Criteria  other (see comments) LOS 11         Nutrition/Diet History     Row Name 05/10/21 0915          Nutrition/Diet History    Typical Food/Fluid Intake  Intake fluctuated 25-75%. POD #7         Anthropometrics     Row Name 05/10/21 0915 05/10/21 0500       Anthropometrics    Height  162.6 cm (64\")  --    Weight  --  131 kg (288 lb 14.4 oz)       Admit Weight    Admit Weight  129 kg (284 lb)  --       Ideal Body Weight (IBW)    Ideal Body Weight (IBW) (kg)  55  --    % of Ideal Body Weight Assessment  other (see comments) 240%  --       Usual Body Weight (UBW)    Weight Gain  unintentional  --       Body Mass Index (BMI)    BMI Assessment  BMI 40 or greater: obesity grade III 49  --        Labs/Tests/Procedures/Meds     Row Name 05/10/21 0916          Labs/Procedures/Meds    Lab Results Reviewed  reviewed     Lab Results Comments  Na 135, Glu, Phos 4.7, Mg 2.7, BUN 44, Cr 1.13        Diagnostic Tests/Procedures    Diagnostic Test/Procedure Reviewed  reviewed     Diagnostic Test/Procedures Comments  S/p AVR (tissue), MVR, Neocords x2 to reconstruct the anterior papillary muscles.  Removal of multiple intracardiac tumors POD#7 [place]        Medications    Pertinent Medications Reviewed  reviewed     Pertinent Medications Comments  lasix, insulin, laxative, coumadin, colace         Physical Findings     Row " "Name 05/10/21 0927          Physical Findings    Overall Physical Appearance  obese;edematous     Skin  surgical incision sternal incision, B 20         Estimated/Assessed Needs     Row Name 05/10/21 0915          Calculation Measurements    Height  162.6 cm (64\")         Nutrition Prescription Ordered     Row Name 05/10/21 0930          Nutrition Prescription PO    Current PO Diet  Regular     Fluid Consistency  Thin     Common Modifiers  Cardiac;Consistent Carbohydrate         Evaluation of Received Nutrient/Fluid Intake     Row Name 05/10/21 0931          PO Evaluation    Number of Days PO Intake Evaluated  2 days     % PO Intake  25-75               Problem/Interventions:  Problem 1     Row Name 05/10/21 0931          Nutrition Diagnoses Problem 1    Problem 1  Nutrition Appropriate for Condition at this Time     Etiology (related to)  Medical Diagnosis     Cardiac  AVR     Endocrine  --     Renal  SOURAV               Intervention Goal     Row Name 05/10/21 0932          Intervention Goal    General  Maintain nutrition;Disease management/therapy     PO  PO intake (%)     PO Intake %  75 %     Weight  Appropriate weight loss         Nutrition Intervention     Row Name 05/10/21 0932          Nutrition Intervention    RD/Tech Action  Follow Tx progress;Care plan reviewd;Encourage intake           Education/Evaluation     Row Name 05/10/21 0937          Education    Education  Will Instruct as appropriate        Monitor/Evaluation    Monitor  Per protocol           Electronically signed by:  Claire Rodriguez RD  05/10/21 09:38 EDT  "

## 2021-05-11 ENCOUNTER — APPOINTMENT (OUTPATIENT)
Dept: GENERAL RADIOLOGY | Facility: HOSPITAL | Age: 46
End: 2021-05-11

## 2021-05-11 VITALS
OXYGEN SATURATION: 95 % | RESPIRATION RATE: 18 BRPM | TEMPERATURE: 97.5 F | SYSTOLIC BLOOD PRESSURE: 111 MMHG | BODY MASS INDEX: 49.31 KG/M2 | HEIGHT: 64 IN | WEIGHT: 288.8 LBS | DIASTOLIC BLOOD PRESSURE: 57 MMHG | HEART RATE: 67 BPM

## 2021-05-11 PROBLEM — Z95.2 S/P MVR (MITRAL VALVE REPLACEMENT): Status: ACTIVE | Noted: 2021-05-11

## 2021-05-11 LAB
ALBUMIN SERPL-MCNC: 3.4 G/DL (ref 3.5–5.2)
ANION GAP SERPL CALCULATED.3IONS-SCNC: 9.3 MMOL/L (ref 5–15)
BUN SERPL-MCNC: 34 MG/DL (ref 6–20)
BUN/CREAT SERPL: 35.8 (ref 7–25)
CALCIUM SPEC-SCNC: 8.3 MG/DL (ref 8.6–10.5)
CHLORIDE SERPL-SCNC: 98 MMOL/L (ref 98–107)
CO2 SERPL-SCNC: 25.7 MMOL/L (ref 22–29)
CREAT SERPL-MCNC: 0.95 MG/DL (ref 0.57–1)
GFR SERPL CREATININE-BSD FRML MDRD: 63 ML/MIN/1.73
GLUCOSE BLDC GLUCOMTR-MCNC: 151 MG/DL (ref 70–130)
GLUCOSE BLDC GLUCOMTR-MCNC: 197 MG/DL (ref 70–130)
GLUCOSE SERPL-MCNC: 144 MG/DL (ref 65–99)
INR PPP: 1.43 (ref 0.9–1.1)
LAB DIRECTOR NAME PROVIDER: NORMAL
PHOSPHATE SERPL-MCNC: 4.7 MG/DL (ref 2.5–4.5)
POTASSIUM SERPL-SCNC: 3.8 MMOL/L (ref 3.5–5.2)
PROTHROMBIN TIME: 17.2 SECONDS (ref 11.7–14.2)
SERPINA1 GENE MUT ANL BLD/T: NORMAL
SERPINA1 GENE MUT TESTED BLD/T: NORMAL
SODIUM SERPL-SCNC: 133 MMOL/L (ref 136–145)

## 2021-05-11 PROCEDURE — 25010000002 ONDANSETRON PER 1 MG: Performed by: NURSE PRACTITIONER

## 2021-05-11 PROCEDURE — 99239 HOSP IP/OBS DSCHRG MGMT >30: CPT | Performed by: NURSE PRACTITIONER

## 2021-05-11 PROCEDURE — 63710000001 INSULIN LISPRO (HUMAN) PER 5 UNITS: Performed by: NURSE PRACTITIONER

## 2021-05-11 PROCEDURE — 74019 RADEX ABDOMEN 2 VIEWS: CPT

## 2021-05-11 PROCEDURE — 80069 RENAL FUNCTION PANEL: CPT | Performed by: INTERNAL MEDICINE

## 2021-05-11 PROCEDURE — 99232 SBSQ HOSP IP/OBS MODERATE 35: CPT | Performed by: INTERNAL MEDICINE

## 2021-05-11 PROCEDURE — 85610 PROTHROMBIN TIME: CPT | Performed by: NURSE PRACTITIONER

## 2021-05-11 PROCEDURE — 82962 GLUCOSE BLOOD TEST: CPT

## 2021-05-11 RX ORDER — ASPIRIN 81 MG/1
81 TABLET ORAL DAILY
Qty: 30 TABLET | Refills: 11 | Status: SHIPPED | OUTPATIENT
Start: 2021-05-12 | End: 2022-03-31 | Stop reason: SDUPTHER

## 2021-05-11 RX ORDER — WARFARIN SODIUM 4 MG/1
TABLET ORAL
Qty: 60 TABLET | Refills: 1 | Status: SHIPPED | OUTPATIENT
Start: 2021-05-11 | End: 2021-10-02 | Stop reason: SDUPTHER

## 2021-05-11 RX ORDER — POLYETHYLENE GLYCOL 3350 17 G/17G
17 POWDER, FOR SOLUTION ORAL DAILY
Qty: 238 G | Refills: 0 | Status: SHIPPED | OUTPATIENT
Start: 2021-05-12 | End: 2022-11-22 | Stop reason: ALTCHOICE

## 2021-05-11 RX ORDER — FUROSEMIDE 40 MG/1
40 TABLET ORAL DAILY
Qty: 30 TABLET | Refills: 5 | Status: SHIPPED | OUTPATIENT
Start: 2021-05-12 | End: 2022-03-31 | Stop reason: SDUPTHER

## 2021-05-11 RX ORDER — AMOXICILLIN 250 MG
2 CAPSULE ORAL NIGHTLY
Start: 2021-05-11

## 2021-05-11 RX ADMIN — INSULIN LISPRO 3 UNITS: 100 INJECTION, SOLUTION INTRAVENOUS; SUBCUTANEOUS at 11:51

## 2021-05-11 RX ADMIN — HYDROCODONE BITARTRATE AND ACETAMINOPHEN 2 TABLET: 5; 325 TABLET ORAL at 00:03

## 2021-05-11 RX ADMIN — ONDANSETRON 4 MG: 2 INJECTION INTRAMUSCULAR; INTRAVENOUS at 00:06

## 2021-05-11 RX ADMIN — ASPIRIN 81 MG: 81 TABLET, COATED ORAL at 09:11

## 2021-05-11 RX ADMIN — POLYETHYLENE GLYCOL 3350 17 G: 17 POWDER, FOR SOLUTION ORAL at 09:11

## 2021-05-11 RX ADMIN — FUROSEMIDE 40 MG: 40 TABLET ORAL at 09:11

## 2021-05-11 RX ADMIN — PANTOPRAZOLE SODIUM 40 MG: 40 TABLET, DELAYED RELEASE ORAL at 06:39

## 2021-05-11 RX ADMIN — METOPROLOL TARTRATE 12.5 MG: 25 TABLET, FILM COATED ORAL at 09:11

## 2021-05-11 RX ADMIN — MUPIROCIN 1 APPLICATION: 20 OINTMENT TOPICAL at 09:12

## 2021-05-11 RX ADMIN — HYDROCODONE BITARTRATE AND ACETAMINOPHEN 2 TABLET: 5; 325 TABLET ORAL at 13:34

## 2021-05-11 RX ADMIN — LINAGLIPTIN 5 MG: 5 TABLET, FILM COATED ORAL at 09:11

## 2021-05-11 RX ADMIN — WARFARIN 4 MG: 4 TABLET ORAL at 16:05

## 2021-05-11 RX ADMIN — GUAIFENESIN 1200 MG: 600 TABLET, EXTENDED RELEASE ORAL at 09:11

## 2021-05-11 RX ADMIN — CHLORHEXIDINE GLUCONATE 15 ML: 1.2 RINSE ORAL at 09:12

## 2021-05-11 RX ADMIN — INSULIN LISPRO 3 UNITS: 100 INJECTION, SOLUTION INTRAVENOUS; SUBCUTANEOUS at 06:39

## 2021-05-11 NOTE — PROGRESS NOTES
[facility]  Inpatient Progress Note    Reason for Follow Up: Cirrhosis, suspect ELY    Subjective     Interval History: H&H stable.  She reports episode of nausea last p.m. that has resolved.  Also describes diminished appetite and constipation.  She has received MiraLAX, Dulcolax, and suppository without effect.  Advised an enema may be appropriate to help stimulate.  Also encouraged ambulation.      Current Facility-Administered Medications:   •  acetaminophen (TYLENOL) tablet 650 mg, 650 mg, Oral, Q4H PRN **OR** acetaminophen (TYLENOL) 160 MG/5ML solution 650 mg, 650 mg, Oral, Q4H PRN **OR** acetaminophen (TYLENOL) suppository 650 mg, 650 mg, Rectal, Q4H PRN, Claire Frazier APRN  •  ALPRAZolam (XANAX) tablet 0.25 mg, 0.25 mg, Oral, Q8H PRN, Claire Frazier APRN  •  aspirin EC tablet 81 mg, 81 mg, Oral, Daily, Claire Frazier APRN, 81 mg at 05/10/21 0808  •  bisacodyl (DULCOLAX) EC tablet 10 mg, 10 mg, Oral, Daily PRN, Fer Valle MD  •  bisacodyl (DULCOLAX) suppository 10 mg, 10 mg, Rectal, Daily PRN, Claire Frazier APRN, 10 mg at 05/09/21 1658  •  chlorhexidine (PERIDEX) 0.12 % solution 15 mL, 15 mL, Mouth/Throat, Q12H, Claire Frazier APRN, 15 mL at 05/10/21 0808  •  cyclobenzaprine (FLEXERIL) tablet 5 mg, 5 mg, Oral, Q8H PRN, Claire Frazier APRN, 5 mg at 05/05/21 1503  •  dextrose (D50W) 25 g/ 50mL Intravenous Solution 25 g, 25 g, Intravenous, Q15 Min PRN, Claire Frazier APRN  •  dextrose (GLUTOSE) oral gel 15 g, 15 g, Oral, Q15 Min PRN, Claire Frazier APRCHRISTINE  •  enoxaparin (LOVENOX) syringe 40 mg, 40 mg, Subcutaneous, Daily, Claire Frazier APRN, 40 mg at 05/10/21 1731  •  furosemide (LASIX) tablet 40 mg, 40 mg, Oral, Daily, Kelvin Sandoval MD, 40 mg at 05/10/21 0808  •  glucagon (human recombinant) (GLUCAGEN DIAGNOSTIC) injection 1 mg, 1 mg, Subcutaneous, Q15 Min PRN, Claire Frazier APRN  •  guaiFENesin (MUCINEX) 12 hr tablet 1,200 mg, 1,200 mg,  Oral, Q12H, Claire Frazier APRN, 1,200 mg at 05/10/21 2028  •  HYDROcodone-acetaminophen (NORCO) 5-325 MG per tablet 2 tablet, 2 tablet, Oral, Q4H PRN, Claire Frazier APRN, 2 tablet at 21 0003  •  insulin lispro (ADMELOG) injection 0-14 Units, 0-14 Units, Subcutaneous, 4x Daily With Meals & Nightly, Claire Frazier APRN, 3 Units at 21 0639  •  ipratropium-albuterol (DUO-NEB) nebulizer solution 3 mL, 3 mL, Nebulization, Q4H PRN, Claire Frazier APRN, 3 mL at 21 0920  •  linagliptin (TRADJENTA) tablet 5 mg, 5 mg, Oral, Daily, Claire Frazier, APRN, 5 mg at 05/10/21 08  •  magnesium hydroxide (MILK OF MAGNESIA) suspension 2400 mg/10mL 10 mL, 10 mL, Oral, Daily PRN, Claire Frazier APRN  •  metoprolol tartrate (LOPRESSOR) tablet 12.5 mg, 12.5 mg, Oral, Q12H, Claire Frazier, APRN, 12.5 mg at 05/10/21 2029  •  morphine injection 4 mg, 4 mg, Intravenous, Q30 Min PRN, Claire Frazier APRN, 2 mg at 21 225  •  mupirocin (BACTROBAN) 2 % nasal ointment, , Each Nare, BID, Claire Frazier, APRN, 1 application at 05/10/21 0808  •  [] morphine injection 1 mg, 1 mg, Intravenous, Q4H PRN **AND** naloxone (NARCAN) injection 0.4 mg, 0.4 mg, Intravenous, Q5 Min PRN, Claire Frazier, APRN  •  ondansetron (ZOFRAN) injection 4 mg, 4 mg, Intravenous, Q6H PRN, Claire Frazier APRN, 4 mg at 21 0006  •  [COMPLETED] pantoprazole (PROTONIX) injection 40 mg, 40 mg, Intravenous, Once, 40 mg at 21 1546 **FOLLOWED BY** pantoprazole (PROTONIX) EC tablet 40 mg, 40 mg, Oral, QAM, Claire Frazier, APRN, 40 mg at 21 0639  •  polyethylene glycol (MIRALAX) packet 17 g, 17 g, Oral, Daily, Claire Frazier APRN, 17 g at 05/10/21 0808  •  potassium chloride (K-DUR,KLOR-CON) ER tablet 40 mEq, 40 mEq, Oral, PRN **OR** potassium chloride (KLOR-CON) packet 40 mEq, 40 mEq, Oral, PRN, Claire Frazier, APRN  •  potassium chloride 10 mEq in 100 mL IVPB, 10 mEq, Intravenous, Q1H PRN **OR**  potassium chloride 10 mEq in 100 mL IVPB, 10 mEq, Intravenous, Q1H PRN, Claire Frazier, APRN  •  potassium chloride 20 mEq in 50 mL IVPB, 20 mEq, Intravenous, Q1H PRN, Claire Frazier, APRN  •  potassium chloride 20 mEq in 50 mL IVPB, 20 mEq, Intravenous, Q1H PRN, Claire Frazier, APRN  •  potassium chloride 20 mEq in 50 mL IVPB, 20 mEq, Intravenous, Q1H PRN, Claire Frazier APRN  •  sennosides-docusate (PERICOLACE) 8.6-50 MG per tablet 2 tablet, 2 tablet, Oral, Nightly, Claire Frazier APRN, 2 tablet at 05/10/21 2028  •  sodium chloride 0.9 % infusion, 30 mL/hr, Intravenous, Continuous PRN, Claire Frazier APRN, Stopped at 05/04/21 0500  •  traMADol (ULTRAM) tablet 50 mg, 50 mg, Oral, Q6H PRN, Claire Frazier APRN, 50 mg at 05/05/21 1725  •  warfarin (COUMADIN) tablet 4 mg, 4 mg, Oral, Daily, Laura Means MD, 4 mg at 05/10/21 1733  Review of Systems:    All systems were reviewed and negative except for:  Gastrointestinal: positive for  See HPI    Objective     Vital Signs  Temp:  [97.4 °F (36.3 °C)-98.1 °F (36.7 °C)] 97.4 °F (36.3 °C)  Heart Rate:  [64-77] 70  Resp:  [18] 18  BP: (110-139)/(60-73) 119/65  Body mass index is 49.57 kg/m².    Intake/Output Summary (Last 24 hours) at 5/11/2021 0907  Last data filed at 5/11/2021 0820  Gross per 24 hour   Intake 480 ml   Output 500 ml   Net -20 ml     I/O this shift:  In: 240 [P.O.:240]  Out: -      Physical Exam:   General: patient awake, alert and cooperative   Eyes: Normal lids and lashes, no scleral icterus   Neck: supple, normal ROM   Skin: warm and dry, not jaundiced   Cardiovascular: regular rhythm and rate, no murmurs auscultated   Pulm: clear to auscultation bilaterally, regular and unlabored   Abdomen: soft, nontender, nondistended; normal bowel sounds   Extremities: no rash or edema   Psychiatric: Normal mood and behavior; memory intact     Results Review:     I reviewed the patient's new clinical results.    Results from last 7 days   Lab  Units 05/10/21  0318 05/07/21  0414 05/06/21  0338   WBC 10*3/mm3 10.37 9.67 13.60*   HEMOGLOBIN g/dL 12.7 11.9* 13.0   HEMATOCRIT % 38.6 36.3 39.0   PLATELETS 10*3/mm3 166 114* 97*     Results from last 7 days   Lab Units 05/11/21  0332 05/10/21  0318 05/09/21  0322 05/06/21  1620 05/06/21  1209 05/05/21  0421   SODIUM mmol/L 133* 135* 130*   < > 130* 132*   POTASSIUM mmol/L 3.8 4.4 3.9  --  4.7 5.2   CHLORIDE mmol/L 98 98 98   < > 96* 99   CO2 mmol/L 25.7 25.5 14.7*   < > 17.9* 18.6*   BUN mg/dL 34* 44* 51*   < > 59* 38*   CREATININE mg/dL 0.95 1.13* 0.98   < > 2.10* 1.85*   CALCIUM mg/dL 8.3* 8.7 8.4*   < > 8.8 8.4*   BILIRUBIN mg/dL  --   --   --   --  0.7 0.9   ALK PHOS U/L  --   --   --   --  74 63   ALT (SGPT) U/L  --   --   --   --  12 13   AST (SGOT) U/L  --   --   --   --  22 38*   GLUCOSE mg/dL 144* 171* 151*   < > 208* 179*    < > = values in this interval not displayed.     Results from last 7 days   Lab Units 05/11/21  0332 05/10/21  0318 05/09/21  0322   INR  1.43* 1.22* 1.18*     No results found for: LIPASE    Radiology:  XR Chest 1 View   Final Result         Electronically signed by Delphine Worthy MD on 05-08-21 at 0553      XR Chest PA & Lateral   Final Result   Bibasilar atelectasis/infiltrate, small bilateral pleural   effusions and interstitial prominence in the perihilar regions   bilaterally all of which were present previously. The left lower lobe   atelectasis/infiltrate is slightly improved.       This report was finalized on 5/7/2021 2:12 PM by Dr. Alvin Fournier M.D.          XR Chest PA & Lateral   Final Result   1. The patient is postop cardiac surgery with aortic and mitral valve   replacement. There is an enlarged cardiomediastinal silhouette and mild   vascular engorgement but no edema. There are small bilateral effusions   and bibasilar atelectasis. No additional active disease is seen in the   chest.       This report was finalized on 5/6/2021 1:41 PM by Dr. Davide Hernández M.D.           XR Chest 1 View   Final Result   Chest tube removal. No pneumothorax.       This report was finalized on 5/5/2021 3:32 PM by Dr. Sanchez Velasquez M.D.          XR Chest 1 View   Final Result   Increasing bibasilar atelectasis.       This report was finalized on 5/5/2021 5:44 AM by Dr. Cassidy Parra M.D.          XR Chest 1 View   Final Result   Improved vascular congestion, although there is some worsening   atelectasis at the left lung base       This report was finalized on 5/4/2021 4:55 AM by Dr. Cassidy Parra M.D.          XR Chest 1 View   Final Result   Postsurgical changes.       This report was finalized on 5/3/2021 4:41 PM by Dr. Sanchez Velasquez M.D.          XR Chest Post CVA Port   Final Result      MRI Cardiac Function Complete With & Without Morphology   Final Result      CT Angiogram Chest   Final Result   1. The study is limited by poor arterial contrast bolus   2. There is no evidence of aortic aneurysm   3. Extensive aortic valvular calcifications   4. Mildly enlarged mediastinal and hilar lymph nodes are nonspecific and   may be reactive. Would suggest a follow-up chest CT in 6 months   5. Areas of interstitial septal thickening in the lungs are nonspecific   and may reflect interstitial edema.   6. No evidence for pleural effusion or airspace consolidation   7. Cirrhotic liver   8. Mild splenomegaly       Radiation dose reduction techniques were utilized, including automated   exposure control and exposure modulation based on body size.       This report was finalized on 4/28/2021 7:34 PM by Dr. Catalino Francis M.D.          CT Angiogram Abdomen Pelvis   Final Result   1. The study is limited by poor arterial contrast bolus   2. There is no evidence of aortic aneurysm   3. Extensive aortic valvular calcifications   4. Mildly enlarged mediastinal and hilar lymph nodes are nonspecific and   may be reactive. Would suggest a follow-up chest CT in 6 months   5. Areas of  interstitial septal thickening in the lungs are nonspecific   and may reflect interstitial edema.   6. No evidence for pleural effusion or airspace consolidation   7. Cirrhotic liver   8. Mild splenomegaly       Radiation dose reduction techniques were utilized, including automated   exposure control and exposure modulation based on body size.       This report was finalized on 4/28/2021 7:34 PM by Dr. Catalino Francis M.D.              Assessment/Plan     Patient Active Problem List   Diagnosis   • Aortic stenosis   • Rheumatic mitral valve disease   • Endocardial fibroelastosis (CMS/HCC)   • Type 2 diabetes mellitus (CMS/HCC)   • Hypertension   • Hyperlipidemia   • ELY (nonalcoholic steatohepatitis)   • SOURAV (acute kidney injury) (CMS/HCC)       Assessment:  1. CT findings consistent with cirrhosis and mild splenomegaly, negative work-up with likely Ely related issues  2. Post AVR, MVR, removal of left ventricular wall lesions  3. Constipation      Plan:  · Initiate enema  · If no response obtain abdominal series to assess fecal burden.  I discussed the patients findings and my recommendations with patient and nursing staff.    Tayo Villeda MD

## 2021-05-11 NOTE — PROGRESS NOTES
NEPHROLOGY PROGRESS NOTE    PATIENT IDENTIFICATION:   Name:  Colette Veronica      MRN:  [id number]     46 y.o.  female             Reason for visit: SOURAV    SUBJECTIVE:   She feels better; leg swelling improving; breathing is comfortable on room air; appetite fine but constipated    OBJECTIVE:  Vitals:    05/11/21 0317 05/11/21 0701 05/11/21 0820 05/11/21 1040   BP: 110/65  119/65 111/57   BP Location: Right arm  Right arm Right arm   Patient Position: Lying  Sitting Sitting   Pulse: 64  70 67   Resp: 18  18 18   Temp: 98 °F (36.7 °C)  97.4 °F (36.3 °C) 97.5 °F (36.4 °C)   TempSrc: Oral  Oral Oral   SpO2: 95%      Weight:  131 kg (288 lb 12.8 oz)     Height:         FiO2 (%): 40 %     Body mass index is 49.57 kg/m².    Intake/Output Summary (Last 24 hours) at 5/11/2021 1222  Last data filed at 5/11/2021 0820  Gross per 24 hour   Intake 480 ml   Output 500 ml   Net -20 ml     Wt Readings from Last 1 Encounters:   05/11/21 0701 131 kg (288 lb 12.8 oz)   05/10/21 0500 131 kg (288 lb 14.4 oz)   05/09/21 0529 132 kg (291 lb 1.6 oz)   05/08/21 0654 132 kg (291 lb)   05/07/21 0633 132 kg (290 lb 6.4 oz)   05/06/21 0654 131 kg (289 lb 11.2 oz)   05/05/21 0713 133 kg (294 lb 3.2 oz)   05/02/21 2144 127 kg (279 lb 12.8 oz)   05/01/21 0520 128 kg (281 lb 14.4 oz)   04/30/21 1910 129 kg (284 lb)     Wt Readings from Last 3 Encounters:   05/11/21 131 kg (288 lb 12.8 oz)   04/29/21 129 kg (284 lb)   04/28/21 129 kg (284 lb)         Exam:  NAD; pleasant; oriented; looks stated age  Facial asymmetry with Bell's palsy on the right  MMM; AT/NC; telangiectasias on face  No eye discharge; no scleral icterus  No JVD; no carotid bruits  Coarse breath sounds bilat; not labored on RA  RRR, no rub  Soft, NT, +D, BS+  +1-2 edema both legs  +clubbing  No asterixis  Moves all extremities   Mood and affect are normal  Speech is fluent    Scheduled meds:    aspirin, 81 mg, Oral, Daily  chlorhexidine, 15 mL, Mouth/Throat, Q12H  enoxaparin, 40  mg, Subcutaneous, Daily  furosemide, 40 mg, Oral, Daily  guaiFENesin, 1,200 mg, Oral, Q12H  insulin lispro, 0-14 Units, Subcutaneous, 4x Daily With Meals & Nightly  linagliptin, 5 mg, Oral, Daily  metoprolol tartrate, 12.5 mg, Oral, Q12H  mupirocin, , Each Nare, BID  pantoprazole, 40 mg, Oral, QAM  polyethylene glycol, 17 g, Oral, Daily  senna-docusate sodium, 2 tablet, Oral, Nightly  warfarin, 4 mg, Oral, Daily      IV meds:                        sodium chloride, 30 mL/hr, Last Rate: Stopped (05/04/21 0500)        Data Review:    Results from last 7 days   Lab Units 05/11/21  0332 05/10/21  0318 05/09/21  0322 05/06/21  1620 05/06/21  1209 05/05/21  0421   SODIUM mmol/L 133* 135* 130*   < > 130* 132*   POTASSIUM mmol/L 3.8 4.4 3.9  --  4.7 5.2   CHLORIDE mmol/L 98 98 98   < > 96* 99   CO2 mmol/L 25.7 25.5 14.7*   < > 17.9* 18.6*   BUN mg/dL 34* 44* 51*   < > 59* 38*   CREATININE mg/dL 0.95 1.13* 0.98   < > 2.10* 1.85*   CALCIUM mg/dL 8.3* 8.7 8.4*   < > 8.8 8.4*   BILIRUBIN mg/dL  --   --   --   --  0.7 0.9   ALK PHOS U/L  --   --   --   --  74 63   ALT (SGPT) U/L  --   --   --   --  12 13   AST (SGOT) U/L  --   --   --   --  22 38*   GLUCOSE mg/dL 144* 171* 151*   < > 208* 179*    < > = values in this interval not displayed.     Estimated Creatinine Clearance: 97.4 mL/min (by C-G formula based on SCr of 0.95 mg/dL).  Results from last 7 days   Lab Units 05/07/21  0414 05/06/21  1414   SODIUM UR mmol/L  --  <20   CREATININE UR mg/dL  --  125.1   URIC ACID mg/dL 8.3*  --      Results from last 7 days   Lab Units 05/11/21  0332 05/10/21  0318 05/09/21  0322   PHOSPHORUS mg/dL 4.7* 4.7* 3.9       Results from last 7 days   Lab Units 05/10/21  0318 05/07/21  0414 05/06/21  0338 05/05/21  0421   WBC 10*3/mm3 10.37 9.67 13.60* 14.90*   HEMOGLOBIN g/dL 12.7 11.9* 13.0 12.4   PLATELETS 10*3/mm3 166 114* 97* 78*       Results from last 7 days   Lab Units 05/11/21  0332 05/10/21  0318 05/09/21  0322 05/08/21  0344  05/05/21  0421   INR  1.43* 1.22* 1.18* 1.28* 1.31*             ASSESSMENT:     Aortic stenosis    Rheumatic mitral valve disease    Endocardial fibroelastosis (CMS/HCC)    Type 2 diabetes mellitus (CMS/HCC)    Hypertension    Hyperlipidemia    ELY (nonalcoholic steatohepatitis)    SOURAV (acute kidney injury) (CMS/HCC)    1.  SOURAV, nonoliguric, stabilizing:  prerenal from hypotension and fluid sequestration following multi-valvular heart surgery.  Expected rise in SCR with diuresis, with SCR now at plateau. Hyponatremia in the setting of cirrhosis and edema, stable; normal potassium and anion gap.  FENa <1%  2.  Critical aortic stenosis s/p bovine aortic valve replacement 5/3 with mitral valve replacement (bovine) as well  3.  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  Hypotension, resolved   5.  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  COPD  7.  Obesity        PLAN:  1.  Oral furosemide 40 mg once daily  2.  Home anytime from renal      Kelvin Sandoval MD  5/11/2021    12:22 EDT

## 2021-05-11 NOTE — PROGRESS NOTES
"    NEPHROLOGY PROGRESS NOTE    PATIENT IDENTIFICATION:   Name:  Colette Veronica      MRN:  [id number]     46 y.o.  female             Reason for visit: SOURAV    SUBJECTIVE:   Breathing is comfortable; appetite is fine; eager to go home; states her urine output is increasing; weight down 3 pounds versus yesterday    OBJECTIVE:  Vitals:    05/10/21 0719 05/10/21 0915 05/10/21 1025 05/10/21 1530   BP: 122/71  139/73 123/60   BP Location: Right arm  Left arm Right arm   Patient Position: Sitting  Sitting Sitting   Pulse: 76  66 70   Resp: 18  18 18   Temp: 97.5 °F (36.4 °C)  97.4 °F (36.3 °C) 97.6 °F (36.4 °C)   TempSrc: Oral  Oral Oral   SpO2: 99%      Weight:       Height:  162.6 cm (64\")       FiO2 (%): 40 %     Body mass index is 49.59 kg/m².    Intake/Output Summary (Last 24 hours) at 5/10/2021 2008  Last data filed at 5/10/2021 0427  Gross per 24 hour   Intake 50 ml   Output 850 ml   Net -800 ml     Wt Readings from Last 1 Encounters:   05/10/21 0500 131 kg (288 lb 14.4 oz)   05/09/21 0529 132 kg (291 lb 1.6 oz)   05/08/21 0654 132 kg (291 lb)   05/07/21 0633 132 kg (290 lb 6.4 oz)   05/06/21 0654 131 kg (289 lb 11.2 oz)   05/05/21 0713 133 kg (294 lb 3.2 oz)   05/02/21 2144 127 kg (279 lb 12.8 oz)   05/01/21 0520 128 kg (281 lb 14.4 oz)   04/30/21 1910 129 kg (284 lb)     Wt Readings from Last 3 Encounters:   05/10/21 131 kg (288 lb 14.4 oz)   04/29/21 129 kg (284 lb)   04/28/21 129 kg (284 lb)         Exam:  NAD; pleasant; oriented; looks stated age  Facial asymmetry with Bell's palsy on the right  MMM; AT/NC; telangiectasias on face  No eye discharge; no scleral icterus  No JVD; no carotid bruits  Coarse breath sounds bilat; a few wheezes; not labored on RA  RRR, no rub  Soft, NT, +D, BS+  +1-2 edema both legs  +clubbing  No asterixis  Moves all extremities   Mood and affect are normal  Speech is fluent    Scheduled meds:    aspirin, 81 mg, Oral, Daily  chlorhexidine, 15 mL, Mouth/Throat, Q12H  enoxaparin, 40 mg, " Subcutaneous, Daily  furosemide, 40 mg, Oral, Daily  guaiFENesin, 1,200 mg, Oral, Q12H  insulin lispro, 0-14 Units, Subcutaneous, 4x Daily With Meals & Nightly  linagliptin, 5 mg, Oral, Daily  metoprolol tartrate, 12.5 mg, Oral, Q12H  mupirocin, , Each Nare, BID  pantoprazole, 40 mg, Oral, QAM  polyethylene glycol, 17 g, Oral, Daily  senna-docusate sodium, 2 tablet, Oral, Nightly  warfarin, 4 mg, Oral, Daily      IV meds:                        sodium chloride, 30 mL/hr, Last Rate: Stopped (05/04/21 0500)        Data Review:    Results from last 7 days   Lab Units 05/10/21  0318 05/09/21  0322 05/08/21  0344 05/06/21  1620 05/06/21  1209 05/05/21  0421   SODIUM mmol/L 135* 130* 131*   < > 130* 132*   POTASSIUM mmol/L 4.4 3.9 4.2  --  4.7 5.2   CHLORIDE mmol/L 98 98 99   < > 96* 99   CO2 mmol/L 25.5 14.7* 19.1*   < > 17.9* 18.6*   BUN mg/dL 44* 51* 59*   < > 59* 38*   CREATININE mg/dL 1.13* 0.98 1.16*   < > 2.10* 1.85*   CALCIUM mg/dL 8.7 8.4* 8.3*   < > 8.8 8.4*   BILIRUBIN mg/dL  --   --   --   --  0.7 0.9   ALK PHOS U/L  --   --   --   --  74 63   ALT (SGPT) U/L  --   --   --   --  12 13   AST (SGOT) U/L  --   --   --   --  22 38*   GLUCOSE mg/dL 171* 151* 213*   < > 208* 179*    < > = values in this interval not displayed.     Estimated Creatinine Clearance: 81.9 mL/min (A) (by C-G formula based on SCr of 1.13 mg/dL (H)).  Results from last 7 days   Lab Units 05/07/21  0414 05/06/21  1414   SODIUM UR mmol/L  --  <20   CREATININE UR mg/dL  --  125.1   URIC ACID mg/dL 8.3*  --      Results from last 7 days   Lab Units 05/10/21  0318 05/09/21  0322 05/08/21  0344 05/04/21  0254   MAGNESIUM mg/dL  --   --   --  2.7*   PHOSPHORUS mg/dL 4.7* 3.9 3.8 5.6*       Results from last 7 days   Lab Units 05/10/21  0318 05/07/21  0414 05/06/21  0338 05/05/21  0421 05/04/21  0254   WBC 10*3/mm3 10.37 9.67 13.60* 14.90* 12.02*   HEMOGLOBIN g/dL 12.7 11.9* 13.0 12.4 13.4   PLATELETS 10*3/mm3 166 114* 97* 78* 98*       Results from  last 7 days   Lab Units 05/10/21  0318 05/09/21  0322 05/08/21  0344 05/05/21  0421 05/04/21  0254   INR  1.22* 1.18* 1.28* 1.31* 1.33*             ASSESSMENT:     Aortic stenosis    Rheumatic mitral valve disease    Endocardial fibroelastosis (CMS/HCC)    Type 2 diabetes mellitus (CMS/HCC)    Hypertension    Hyperlipidemia    ELY (nonalcoholic steatohepatitis)    SOURAV (acute kidney injury) (CMS/HCC)    1.  SOURAV, nonoliguric, stabilizing:  prerenal from hypotension and fluid sequestration following multi-valvular heart surgery.  Expected rise in SCR with diuresis. Hyponatremia in the setting of cirrhosis and edema, improving; normal potassium and anion gap.  FENa <1%  2.  Critical aortic stenosis s/p bovine aortic valve replacement 5/3 with mitral valve replacement (bovine) as well  3.  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  Hypotension, resolved   5.  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  COPD  7.  Obesity        PLAN:  1.  Continue oral furosemide 40 mg once daily  2.  Hopefully home soon      Kelvin Sandoval MD  5/10/2021    20:08 EDT

## 2021-05-11 NOTE — PROGRESS NOTES
LOS: 12 days   Patient Care Team:  Tayo Sanon MD as PCP - General (Family Medicine)    Chief Complaint: post op    Subjective      Vital Signs  Temp:  [97.4 °F (36.3 °C)-98.1 °F (36.7 °C)] 97.4 °F (36.3 °C)  Heart Rate:  [64-77] 70  Resp:  [18] 18  BP: (110-139)/(60-73) 119/65  Body mass index is 49.57 kg/m².    Intake/Output Summary (Last 24 hours) at 5/11/2021 0941  Last data filed at 5/11/2021 0820  Gross per 24 hour   Intake 480 ml   Output 500 ml   Net -20 ml     I/O this shift:  In: 240 [P.O.:240]  Out: -           05/09/21  0529 05/10/21  0500 05/11/21  0701   Weight: 132 kg (291 lb 1.6 oz) 131 kg (288 lb 14.4 oz) 131 kg (288 lb 12.8 oz)         Objective    Results Review:        WBC WBC   Date Value Ref Range Status   05/10/2021 10.37 3.40 - 10.80 10*3/mm3 Final      HGB Hemoglobin   Date Value Ref Range Status   05/10/2021 12.7 12.0 - 15.9 g/dL Final      HCT Hematocrit   Date Value Ref Range Status   05/10/2021 38.6 34.0 - 46.6 % Final      Platelets Platelets   Date Value Ref Range Status   05/10/2021 166 140 - 450 10*3/mm3 Final        PT/INR:    Protime   Date Value Ref Range Status   05/11/2021 17.2 (H) 11.7 - 14.2 Seconds Final   05/10/2021 15.2 (H) 11.7 - 14.2 Seconds Final   05/09/2021 14.8 (H) 11.7 - 14.2 Seconds Final   /  INR   Date Value Ref Range Status   05/11/2021 1.43 (H) 0.90 - 1.10 Final   05/10/2021 1.22 (H) 0.90 - 1.10 Final   05/09/2021 1.18 (H) 0.90 - 1.10 Final       Sodium Sodium   Date Value Ref Range Status   05/11/2021 133 (L) 136 - 145 mmol/L Final   05/10/2021 135 (L) 136 - 145 mmol/L Final   05/09/2021 130 (L) 136 - 145 mmol/L Final      Potassium Potassium   Date Value Ref Range Status   05/11/2021 3.8 3.5 - 5.2 mmol/L Final   05/10/2021 4.4 3.5 - 5.2 mmol/L Final   05/09/2021 3.9 3.5 - 5.2 mmol/L Final      Chloride Chloride   Date Value Ref Range Status   05/11/2021 98 98 - 107 mmol/L Final   05/10/2021 98 98 - 107 mmol/L Final   05/09/2021 98 98 - 107 mmol/L Final       Bicarbonate CO2   Date Value Ref Range Status   05/11/2021 25.7 22.0 - 29.0 mmol/L Final   05/10/2021 25.5 22.0 - 29.0 mmol/L Final   05/09/2021 14.7 (L) 22.0 - 29.0 mmol/L Final      BUN BUN   Date Value Ref Range Status   05/11/2021 34 (H) 6 - 20 mg/dL Final   05/10/2021 44 (H) 6 - 20 mg/dL Final   05/09/2021 51 (H) 6 - 20 mg/dL Final      Creatinine Creatinine   Date Value Ref Range Status   05/11/2021 0.95 0.57 - 1.00 mg/dL Final   05/10/2021 1.13 (H) 0.57 - 1.00 mg/dL Final   05/09/2021 0.98 0.57 - 1.00 mg/dL Final      Calcium Calcium   Date Value Ref Range Status   05/11/2021 8.3 (L) 8.6 - 10.5 mg/dL Final   05/10/2021 8.7 8.6 - 10.5 mg/dL Final   05/09/2021 8.4 (L) 8.6 - 10.5 mg/dL Final      Magnesium No results found for: MG       aspirin, 81 mg, Oral, Daily  chlorhexidine, 15 mL, Mouth/Throat, Q12H  enoxaparin, 40 mg, Subcutaneous, Daily  furosemide, 40 mg, Oral, Daily  guaiFENesin, 1,200 mg, Oral, Q12H  insulin lispro, 0-14 Units, Subcutaneous, 4x Daily With Meals & Nightly  linagliptin, 5 mg, Oral, Daily  metoprolol tartrate, 12.5 mg, Oral, Q12H  mupirocin, , Each Nare, BID  pantoprazole, 40 mg, Oral, QAM  polyethylene glycol, 17 g, Oral, Daily  senna-docusate sodium, 2 tablet, Oral, Nightly  warfarin, 4 mg, Oral, Daily      sodium chloride, 30 mL/hr, Last Rate: Stopped (05/04/21 0500)            Patient Active Problem List   Diagnosis Code   • Aortic stenosis I35.0   • 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  Removal of multiple intracardiac tumors POD#8 [place]  -Myxomatous multiple mobile mass   -Current tobacco abuse-- encourage  cessation   -Morbid obesity   -hx of PE   -bell's palsy   -cirrhotic liver on CT scan  -leukocytosis- probable reactive  -TCP-- plt count 97     Looks good this morning  normalized  Encourage pulmonary toilet  Continue coumadin loading, Dr. Noguera would like 6 weeks of coumadin post operatively  Okay from our standpoint for discharge home with home health if Okay with all  Home health orders placed  Follow up with me on 6/10 at 1pm  Routine care           JAUN Orlando  05/11/21  09:41 EDT

## 2021-05-11 NOTE — PLAN OF CARE
Goal Outcome Evaluation:  Plan of Care Reviewed With: patient  Progress: improving  Outcome Summary: VSS. Norco given for pain. Pt complained of nausea which was relieved with Zofran. Still no BM postop.

## 2021-05-11 NOTE — CASE MANAGEMENT/SOCIAL WORK
Case Management Discharge Note      Final Note: Pt d/c home and plans to go to the [facility] for blood draws.  No home health could be secured due to Pt payer source.  SS/CCP         Selected Continued Care - Discharged on 5/11/2021 Admission date: 4/28/2021 - Discharge disposition: Home or Self Care    Destination    No services have been selected for the patient.              Durable Medical Equipment    No services have been selected for the patient.              Dialysis/Infusion    No services have been selected for the patient.              Home Medical Care    No services have been selected for the patient.              Therapy    No services have been selected for the patient.              Community Resources    No services have been selected for the patient.                       Final Discharge Disposition Code: 01 - home or self-care

## 2021-05-11 NOTE — PROGRESS NOTES
"    Patient Name: Colette Veronica  :1975  46 y.o.      Patient Care Team:  Tayo Sanon MD as PCP - General (Family Medicine)    Chief Complaint: follow up endocardial fibroelastomas     Interval History: she is feeling pretty good today. No bowel movement since prior to surgery despite suppository and miralax. Received tap water enema with modest results. She says she still feels like she has to go but can't. Mostly liquid return from enema.        Objective   Vital Signs  Temp:  [97.4 °F (36.3 °C)-98.1 °F (36.7 °C)] 97.5 °F (36.4 °C)  Heart Rate:  [64-77] 67  Resp:  [18] 18  BP: (110-124)/(57-72) 111/57    Intake/Output Summary (Last 24 hours) at 2021 1258  Last data filed at 2021 0820  Gross per 24 hour   Intake 480 ml   Output 500 ml   Net -20 ml     Flowsheet Rows      First Filed Value   Admission Height  162.6 cm (64.02\") Documented at 2021   Admission Weight  129 kg (284 lb) Documented at 2021          Physical Exam:   General Appearance:    Alert, cooperative, in no acute distress   Lungs:     Clear to auscultation.  Normal respiratory effort and rate.      Heart:    Regular rhythm and normal rate, normal S1 and S2, no murmurs, gallops or rubs.     Chest Wall:    No abnormalities observed   Abdomen:     Soft, nontender, positive bowel sounds.     Extremities:   no cyanosis, clubbing or edema.  No marked joint deformities.  Adequate musculoskeletal strength.       Results Review:    Results from last 7 days   Lab Units 21  0332   SODIUM mmol/L 133*   POTASSIUM mmol/L 3.8   CHLORIDE mmol/L 98   CO2 mmol/L 25.7   BUN mg/dL 34*   CREATININE mg/dL 0.95   GLUCOSE mg/dL 144*   CALCIUM mg/dL 8.3*         Results from last 7 days   Lab Units 05/10/21  0318   WBC 10*3/mm3 10.37   HEMOGLOBIN g/dL 12.7   HEMATOCRIT % 38.6   PLATELETS 10*3/mm3 166     Results from last 7 days   Lab Units 21  0332 05/10/21  0318 21  0322   INR  1.43* 1.22* 1.18*               "         Medication Review:   aspirin, 81 mg, Oral, Daily  chlorhexidine, 15 mL, Mouth/Throat, Q12H  enoxaparin, 40 mg, Subcutaneous, Daily  furosemide, 40 mg, Oral, Daily  guaiFENesin, 1,200 mg, Oral, Q12H  insulin lispro, 0-14 Units, Subcutaneous, 4x Daily With Meals & Nightly  linagliptin, 5 mg, Oral, Daily  metoprolol tartrate, 12.5 mg, Oral, Q12H  mupirocin, , Each Nare, BID  pantoprazole, 40 mg, Oral, QAM  polyethylene glycol, 17 g, Oral, Daily  senna-docusate sodium, 2 tablet, Oral, Nightly  warfarin, 4 mg, Oral, Daily         sodium chloride, 30 mL/hr, Last Rate: Stopped (05/04/21 0500)        Assessment/Plan      1. Endocardial fibroelastomas -- POD#8 surgical removal     2. Rheumatic aortic and mitral valve disease -- POD#8 21mm Inspiris bovine pericardial AVR and 29mm [place] Epic porcine MVR and neocord x 2 to reconstruct the anterior papillary muscules; normal LVEF/cors     3. Morbid obesity     4. DM2     5. ELY     6. HTN     7. Hyperlipidemia     8. SOURAV, improved     9. Constipation    - modest results from enema. Mostly liquid. Will obtained abdominal series as recommended by Dr. Villeda.   - continue warfarin. Surgery plans for 6 weeks of AC.   - cleared for home by all. Will dc once bowels have moved adequately.     JAUN Romo  [facility]  05/11/21  12:58 EDT

## 2021-05-11 NOTE — CASE MANAGEMENT/SOCIAL WORK
Continued Stay Note  [facility]     Patient Name: Colette Veronica  MRN: [id number]  Today's Date: 5/11/2021    Admit Date: 4/28/2021    Discharge Plan     Row Name 05/11/21 0959       Plan    Plan  Home w/ family;  NO home health could be secured due to Pt payer source.    Plan Comments  CCP placed home health referrals to all agencies that service [facility].  CCP received denials from all ([facility], [facility], [facility], [facility] ).  All declined due to either Pt insurance not being accepted or low staffing in Pt regional area.  CCP informed JAUN Rangel & JULY Gloden and the patient.  CCP continues to follow.  SS/CCP    Row Name 05/11/21 0958       Plan    Plan  --    Plan Comments  --        Discharge Codes    No documentation.       Expected Discharge Date and Time     Expected Discharge Date Expected Discharge Time    May 10, 2021             Anabel Joseph RN

## 2021-05-12 ENCOUNTER — READMISSION MANAGEMENT (OUTPATIENT)
Dept: CALL CENTER | Facility: HOSPITAL | Age: 46
End: 2021-05-12

## 2021-05-12 NOTE — OUTREACH NOTE
Prep Survey      Responses   [facility] facility patient discharged from?  [place]   Is LACE score < 7 ?  No   Emergency Room discharge w/ pulse ox?  No   Eligibility  Readm Mgmt   Discharge diagnosis   Aortic stenosis aortic valve replacement with 21 mm Inspiris bovine pericardial valve, mitral valve replacement with 29 mm [place] epic porcine valve, Neocords x 2 to reconstruct the anterior papillary muscules, and removal of multiple intracardiac tumors. Too numerous to count.    Does the patient have one of the following disease processes/diagnoses(primary or secondary)?  Cardiothoracic surgery   Does the patient have Home health ordered?  No   Comments regarding appointments  [facility]    Prep survey completed?  Yes          Stephanie Butler RN

## 2021-05-12 NOTE — PAYOR COMM NOTE
"Ede Veronica (46 y.o. Female)                                  ATTENTION;      DC SUMMARY CASE REF #        [id number]          Date of Birth Social Security Number Address Home Phone MRN    1975  [address] [place] [address] [phone] [id number]    [employer] Mar[employer] Status          Unknown        Admission Date Admission Type Admitting Provider Attending Provider Department, Room/Bed    21 Urgent Bandar Thao MD  [facility] CARDIOVASC UNIT,     Discharge Date Discharge Disposition Discharge Destination        2021 Home or Self Care              Attending Provider: (none)   Allergies: Penicillins, Red Dye    Isolation: None   Infection: None   Code Status: Prior    Ht: 162.6 cm (64\")   Wt: 131 kg (288 lb 12.8 oz)    Admission Cmt: None   Principal Problem: None                Active Insurance as of 2021     Primary Coverage     Payor Plan Insurance Group Employer/Plan Group    [place] [id number]     Payor Plan Address Payor Plan Phone Number Payor Plan Fax Number Effective Dates    [employer] [address] [address] [phone]  2021 - None Entered    [facility] [id number]       Subscriber Name Subscriber Birth Date Member ID       EDE VERONICA 1975 [id number]                 Emergency Contacts      (Rel.) Home Phone Work Phone Mobile Phone    Brie Lujan (Mother) [phone]    Davide Mckeon (Significant Other) [phone] -- [phone]               Discharge Summary      Kelli Raymond APRN at 21 1541     Attestation signed by Bandar Thao MD at 21 1606    I have reviewed this documentation and agree.                        Hospital Discharge    Patient Name: Ede Veronica  Age/Sex: 46 y.o. female  : 1975  MRN: [id number]    Encounter Provider: JAUN Romo  Referring Provider: Bandar Thao MD  Place of " Service: [facility] CARDIOLOGY  Patient Care Team:  Bay Ravi RPH as PCP - General (Pharmacy)  Bay Ravi RPH as Pharmacist (Pharmacy)         Date of Discharge:  5/11/2021   Date of Admit: 4/28/2021    Discharge Condition: Stable  Discharge Diagnosis:    Aortic stenosis    Rheumatic mitral valve disease    Endocardial fibroelastosis (CMS/HCC)    Type 2 diabetes mellitus (CMS/HCC)    Hypertension    Hyperlipidemia    ELY (nonalcoholic steatohepatitis)    SOURAV (acute kidney injury) (CMS/[facility])      Hospital Course:   Colette Veronica is a 46 y.o. female was referred to [facility] for outpatient echocardiogram. She had been having worsening dyspnea on exertion for the past several months. Echocardiogram showed critical aortic stenosis with possible bicuspid aortic valve. She was also noted to have multiple echo densities in the left ventricle. She was directly admitted to the hospital for further evaluation. 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 She had normal coronaries on preop angiography. She was seen by GI due to CT findings consistent with cirrhosis and mild splenomegaly. On 5/3 she underwent aortic valve replacement with 21 mm Inspiris bovine pericardial valve, mitral valve replacement with 29 mm [place] epic porcine valve, Neocords x 2 to reconstruct the anterior papillary muscules, and removal of multiple intracardiac tumors. Too numerous to count. She has post op SOURAV and nephrology was consulted on 5/6. Likely due to hypotension and fluid sequestration following multi valvular heart surgery. She was treated with IVF and renal function returned to baseline. Eventually diuretics were restarted. She continued to get better and stronger. She struggled with constipation post operatively despite miralax and dulxalax. GI recommended tap  water enema today which resulted in modest results. She had a second bowel movement that was a little better. She will continue bowel regimen at home. Abdominal xrays were technically difficult due to body habitus. She has good bowel sounds. I encouraged increase ambulation and continuation of bowel regimen at home. She was started on warfarin with plans for 6 weeks of AC per Dr. Noguera. Unfortunately we could not find a home health agency to provide her in home care and therapy. Referral to anticoagulation clinic was sent. She is stable for discharge. +  Objective:  Temp:  [97.4 °F (36.3 °C)-98.1 °F (36.7 °C)] 97.5 °F (36.4 °C)  Heart Rate:  [64-77] 67  Resp:  [18] 18  BP: (110-124)/(57-72) 111/57    Intake/Output Summary (Last 24 hours) at 5/11/2021 1541  Last data filed at 5/11/2021 0820  Gross per 24 hour   Intake 480 ml   Output 500 ml   Net -20 ml     Body mass index is 49.57 kg/m².      05/09/21  0529 05/10/21  0500 05/11/21  0701   Weight: 132 kg (291 lb 1.6 oz) 131 kg (288 lb 14.4 oz) 131 kg (288 lb 12.8 oz)     Weight change: -0.045 kg (-1.6 oz)    Physical Exam:  Constitutional: She is oriented to person, place, and time. She appears well-developed. She does not appear ill.   HENT:   Head: Normocephalic and atraumatic. Head is without contusion.   Right Ear: Hearing normal. No drainage.   Left Ear: Hearing normal. No drainage.   Nose: No nasal deformity. No epistaxis.   Eyes: Lids are normal. Right eye exhibits no exudate. Left eye exhibits no exudate.  Neck: No JVD present. Carotid bruit is not present. No tracheal deviation present. No thyroid mass and no thyromegaly present.   Cardiovascular: Normal rate, regular rhythm and distant heart sounds, no murmur heard.    Pulses:       Posterior tibial pulses are 2+ on the right side, and 2+ on the left side.   Pulmonary/Chest: Effort normal and breath sounds normal.   Abdominal: Soft. Normal appearance and bowel sounds are normal. There is no tenderness.    Musculoskeletal: Normal range of motion.        Neurological: She is alert and oriented to person, place, and time. She has normal strength.   Skin: Skin is warm, dry and intact. No rash noted. 2+ edema legs  Psychiatric: She has a normal mood and affect. Her behavior is normal. Thought content normal.   Vitals reviewed      Procedures Performed  Procedure(s):  JACKELYN STERNOTOMY AORTIC VALVE REPLACEMENT, MITRAL VALVE REPLACEMENT, EXCISION OF BENIGN PAPILLARY FIBROELASTOMAS  AND PRP       Consults:  Consults     Date and Time Order Name Status Description    5/6/2021  8:32 AM Inpatient Nephrology Consult      4/29/2021 10:41 AM Hematology & Oncology Inpatient Consult Completed     4/29/2021  8:42 AM Inpatient Gastroenterology Consult Completed           Pertinent Test Results:  Results from last 7 days   Lab Units 05/11/21  0332 05/10/21  0318 05/09/21  0322 05/08/21  0344 05/07/21  0414 05/06/21  1620 05/06/21  1209 05/06/21  0338 05/05/21  0421   SODIUM mmol/L 133* 135* 130* 131* 130*  --  130* 128* 132*   POTASSIUM mmol/L 3.8 4.4 3.9 4.2 4.2 4.5 4.7 5.3* 5.2   CHLORIDE mmol/L 98 98 98 99 100  --  96* 97* 99   CO2 mmol/L 25.7 25.5 14.7* 19.1* 18.3*  --  17.9* 16.7* 18.6*   BUN mg/dL 34* 44* 51* 59* 66*  --  59* 54* 38*   CREATININE mg/dL 0.95 1.13* 0.98 1.16* 1.58*  --  2.10* 2.01* 1.85*   GLUCOSE mg/dL 144* 171* 151* 213* 153*  --  208* 167* 179*   CALCIUM mg/dL 8.3* 8.7 8.4* 8.3* 8.3*  --  8.8 8.6 8.4*   AST (SGOT) U/L  --   --   --   --   --   --  22  --  38*   ALT (SGPT) U/L  --   --   --   --   --   --  12  --  13         Results from last 7 days   Lab Units 05/10/21  0318 05/07/21  0414 05/06/21  0338 05/05/21  0421   WBC 10*3/mm3 10.37 9.67 13.60* 14.90*   HEMOGLOBIN g/dL 12.7 11.9* 13.0 12.4   HEMATOCRIT % 38.6 36.3 39.0 37.8   PLATELETS 10*3/mm3 166 114* 97* 78*     Results from last 7 days   Lab Units 05/11/21  0332 05/10/21  0318 05/09/21  0322 05/08/21  0344 05/05/21  0421   INR  1.43* 1.22* 1.18* 1.28*  1.31*               Invalid input(s): LDLCALC            Discharge Medications     Discharge Medications      New Medications      Instructions Start Date   Adult Aspirin Regimen 81 MG EC tablet  Generic drug: aspirin   81 mg, Oral, Daily   Start Date: May 12, 2021     ClearLax 17 GM/SCOOP powder  Generic drug: polyethylene glycol   Mix 17g (1 capful) in liquid and take by mouth Daily.   Start Date: May 12, 2021     cyclobenzaprine 5 MG tablet  Commonly known as: FLEXERIL   5 mg, Oral, Every 8 Hours PRN      furosemide 40 MG tablet  Commonly known as: LASIX   40 mg, Oral, Daily   Start Date: May 12, 2021     HYDROcodone-acetaminophen 5-325 MG per tablet  Commonly known as: NORCO   1 tablet, Oral, Every 4 Hours PRN      metoprolol tartrate 25 MG tablet  Commonly known as: LOPRESSOR   12.5 mg, Oral, Every 12 Hours Scheduled      sennosides-docusate 8.6-50 MG per tablet  Commonly known as: PERICOLACE   2 tablets, Oral, Nightly      warfarin 4 MG tablet  Commonly known as: COUMADIN   Take 4 mg daily or as instructed by anticoagulation clinic         Continue These Medications      Instructions Start Date   budesonide-formoterol 160-4.5 MCG/ACT inhaler  Commonly known as: SYMBICORT   2 puffs, Inhalation, 2 Times Daily - RT      CHANTIX STARTING MONTH RITA PO   Oral      metFORMIN  MG 24 hr tablet  Commonly known as: GLUCOPHAGE-XR   750 mg, Oral, 2 Times Daily      simvastatin 80 MG tablet  Commonly known as: ZOCOR   80 mg, Oral, Nightly      SITagliptin 100 MG tablet  Commonly known as: JANUVIA   100 mg, Oral, Daily         Stop These Medications    amLODIPine 2.5 MG tablet  Commonly known as: NORVASC     triamterene-hydrochlorothiazide 37.5-25 MG per capsule  Commonly known as: DYAZIDE            Discharge Diet:    Dietary Orders (From admission, onward)     Start     Ordered    05/10/21 1108  Diet Regular; Consistent Carbohydrate  Diet Effective Now     Question Answer Comment   Diet Texture / Consistency Regular   "  Common Modifiers Consistent Carbohydrate        05/10/21 1107                Activity at Discharge:    Activity Instructions     Gradually Increase Activity Until at Pre-Hospitalization Level             Discharge disposition: home     Discharge Instructions and Follow ups:  Future Appointments   Date Time Provider Department Center   5/13/2021  9:00 AM ANTI COAG CLINIC [facility] None   6/10/2021  1:00 PM Claire Frazier APRN [facility]     Additional Instructions for the Follow-ups that You Need to Schedule     Ambulatory Referral to Anti Coag Clinic   As directed       Select To DEPT SPEC to filter TO DEPT       Send INR reminders to the \"clinical pool\" of the TO DEPT     (ie:  [facility]  or [facility] CROSSING CLINICAL POOL)     Ambulatory Referral to Cardiac Rehab   As directed      Ambulatory Referral to Home Health   As directed      Face to Face Visit Date: 5/10/2021    Follow-up provider for Plan of Care?: I will be treating the patient on an ongoing basis.  Please send me the Plan of Care for signature.    Follow-up provider: JR ANDREW JIMENEZ [[id number]]    Reason/Clinical Findings: post op open heart    Describe mobility limitations that make leaving home difficult: weakness    Nursing/Therapeutic Services Requested: Skilled Nursing    Skilled nursing orders: Post CABG care    Frequency: 1 Week 1         Call MD With Problems / Concerns   As directed      Instructions:  Call office at [phone] for any drainage, increased redness, or fever over 100.5    Order Comments: Instructions:  Call office at [phone] for any drainage, increased redness, or fever over 100.5          Discharge Follow-up with PCP   As directed       Currently Documented PCP:    Tayo Sanon MD    PCP Phone Number:    [phone]     Follow Up Details: in 1 week         Discharge Follow-up with Specialty: Cardiologist APRN/PA; 1 Week   As directed      Specialty: Cardiologist APRN/PA  "   Follow Up: 1 Week    Follow Up Details: bring all prescription bottles to appointment, call for appointment         Discharge Follow-up with Specified Provider: JAUN Singh; 1 Week   As directed      To: JAUN Singh    Follow Up: 1 Week         Discharge Follow-up with Specified Provider: Cardiologist; 1 Month   As directed      To: Cardiologist    Follow Up: 1 Month    Follow Up Details: call for appointment, bring all medication bottles to appointment         Discharge Follow-up with Specified Provider: Claire Gomez CT surgery JAUN; 1 Month   As directed      To: Claire Gomez CT surgery JAUN    Follow Up: 1 Month    Follow Up Details: 4-6 weeks, bring all current medications to appointment        Additional information on Labs and Follow-ups:     Ambulatory Referral to Anti Coag Clinic   Thursday 5/13/2021 at 9 A.M.           Follow-up Information     [facility] .    Specialty: Cardiac Rehabilitation  Contact information:  [address] [facility] [address] [phone]           Tayo Sanon MD .    Specialty: Family Medicine  Why: in 1 week  Contact information:  [address] [facility] [address]  [phone]                   Test Results Pending at Discharge: INR Thursday.      JAUN Romo  05/11/21  15:41 EDT    Time: Discharge 35 min        Electronically signed by Bandar Thao MD at 05/11/21 4099

## 2021-05-13 ENCOUNTER — TELEPHONE (OUTPATIENT)
Dept: CARDIAC SURGERY | Facility: CLINIC | Age: 46
End: 2021-05-13

## 2021-05-13 ENCOUNTER — NURSE TRIAGE (OUTPATIENT)
Dept: CALL CENTER | Facility: HOSPITAL | Age: 46
End: 2021-05-13

## 2021-05-13 ENCOUNTER — ANTICOAGULATION VISIT (OUTPATIENT)
Dept: PHARMACY | Facility: HOSPITAL | Age: 46
End: 2021-05-13

## 2021-05-13 DIAGNOSIS — Z95.2 S/P MVR (MITRAL VALVE REPLACEMENT): Primary | ICD-10-CM

## 2021-05-13 LAB
INR PPP: 2.3 (ref 0.91–1.09)
PROTHROMBIN TIME: 27.8 SECONDS (ref 10–13.8)

## 2021-05-13 PROCEDURE — 85610 PROTHROMBIN TIME: CPT

## 2021-05-13 PROCEDURE — G0463 HOSPITAL OUTPT CLINIC VISIT: HCPCS

## 2021-05-13 PROCEDURE — 36416 COLLJ CAPILLARY BLOOD SPEC: CPT

## 2021-05-13 NOTE — TELEPHONE ENCOUNTER
Patients family calls because she has had an increase in the edema in her feet and ankles since getting home from surgery The pharmacist suggested she call our office I spoke with Margarita ZAMORANO and she has increased her lasix for the next two days to 20mg BID. Patient has been instructed to wear her Mayco Hose daily during the day and keep her legs elevated as much as possible. She will also start to weigh daily They will call our office back if this does not resolve the problem

## 2021-05-13 NOTE — TELEPHONE ENCOUNTER
Caller advised to call Cardiovascular surgeon about Patient having swelling in bilateral feet and ankles. Caller stated that Pharmacist suggested he call and get her Lasix dosage increased. Advised the surgeon should address this and would want to know about this symptom. Gave caller office number for Cardiothoracic surgeon. Caller agrees to call.     Reason for Disposition  • NON-URGENT call redirected to PCP's office because it is open    Additional Information  • Negative: Caller is angry or rude (e.g., hangs up, verbally abusive, yelling)  • Negative: Caller hangs up  • Negative: Caller has already spoken with the PCP and has no further questions.  • Negative: Caller has already spoken with another triager and has no further questions.  • Negative: Caller has already spoken with another triager or PCP AND has further questions AND triager able to answer questions.  • Negative: Busy signal.  First attempt to contact caller.  Follow-up call scheduled within 15 minutes.  • Negative: No answer.  First attempt to contact caller.  Follow-up call scheduled within 15 minutes.  • Negative: Message left on identified voice mail  • Negative: Message left on unidentified voice mail.  Phone number verified.  • Negative: Message left with person in household.  • Negative: Wrong number reached.  Phone number verified.  • Negative: Second attempt to contact family AND no contact made.  Phone number verified.  • Negative: Cell phone out of range.  Phone number verified.  • Negative: Pager number given.  Answering service notified.  • Negative: Patient already left for the hospital/clinic.  • Negative: Caller has cancelled the call before the first contact  • Negative: Unable to complete triage due to phone connection issues    Protocols used: NO CONTACT OR DUPLICATE CONTACT CALL-[facility]-

## 2021-05-13 NOTE — TELEPHONE ENCOUNTER
Msg sent to Dr Noguera's nurse Mariano to inform Pt went to her first visit with the [facility] anti coag dept today and they told the pt to contact our office because the pt is experiencing some swelling in her ankles that is hurting her. The  pt said the anti coag dept mentioned having our office adjust the dosage of her Lasix. Pt would like a call back at [phone].

## 2021-05-13 NOTE — PROGRESS NOTES
Anticoagulation Clinic Progress Note  Anticoagulation Summary  As of 2021    INR goal:  2.0-3.0   TTR:  --   INR used for dosin.3 (2021)   Warfarin maintenance plan:  2 mg every Sun, Thu; 4 mg all other days   Weekly warfarin total:  24 mg   Plan last modified:  Bay Ravi RPH (2021)   Next INR check:  2021   Target end date:  6/10/2021    Indications    S/P MVR (mitral valve replacement) [Z95.2]             Anticoagulation Episode Summary     INR check location:      Preferred lab:      Send INR reminders to:  [facility]    Comments:        Anticoagulation Care Providers     Provider Role Specialty Phone number    Kelli Raymond, APRN Referring Cardiology [phone]          Clinic Interview:  Patient Findings     Positives:  Hospital admission, Other complaints    Negatives:  Signs/symptoms of thrombosis, Signs/symptoms of bleeding,   Laboratory test error suspected, Change in health, Change in alcohol use,   Change in activity, Upcoming invasive procedure, Emergency department   visit, Upcoming dental procedure, Missed doses, Extra doses, Change in   medications, Change in diet/appetite, Bruising    Comments:  Bioprosthetic AVR/MVR - 6 weeks total of anticoagulation per   cardiothoracic. Reports she quit smoking on 21.       Clinical Outcomes     Negatives:  Major bleeding event, Thromboembolic event,   Anticoagulation-related hospital admission, Anticoagulation-related ED   visit, Anticoagulation-related fatality    Comments:  Bioprosthetic AVR/MVR - 6 weeks total of anticoagulation per   cardiothoracic. Reports she quit smoking on 21.              Education:  Colette Veronica is a new start in the [facility] Clinic. We discussed the followin) Warfarin's indication, mechanism, and dosing  2) Enforced the importance of taking warfarin as instructed and at the same time every day, preferably in the evening so that we can make dose  adjustments more easily following subsequent clinic visits  3) What she should do about a missed dose; pts can take missed doses within about 12 hours of their usual scheduled dose, but she was instructed on the importance of not doubling up on doses unless told to do so by the [facility] Clinic  4) Explained possible side effects of warfarin therapy, including increased risk of bleeding, s/sx of bleeding and s/sx of any additional clots/PE/CVA.   5) Discussed monitoring of warfarin, the INR, goal INR range, and the frequency of monitoring  6) Reviewed drug/food/tobacco/EtOH interactions and provided written information covering these topics in more detail, explaining that green, leafy vegetables interact most heavily with warfarin  7) Instructed the pt not to take or discontinue any medications without informing her physician/pharmacist and reminded her to inform us of any dietary changes, as well  8) Explained that she would be coming into the clinic more frequently in these first few weeks of therapy as we try to adjust her dose and achieve a therapeutic INR x 2 consecutive readings. Once that is achieved, patient will follow up in clinic every 4 weeks, on average.    She stated no problems with transportation or scheduling clinic appts in this manner. she expressed understanding of the information provided and has no additional questions at this time.    Colette Veronica was presented with a copy of the Patients Rights and Responsibilities. she expressed verbal consent and agreement to receive care in the [facility] Management Clinic under the current collaborative care agreement with [facility].       INR History:  Anticoagulation Monitoring 5/13/2021   INR 2.3   INR Date 5/13/2021   INR Goal 2.0-3.0   Last Week Total 18 mg   Next Week Total 24 mg   Sun 2 mg   Mon -   Tue -   Wed -   Thu 2 mg   Fri 4 mg   Sat 4 mg   Visit Report -   ]    Plan:  1. INR is Therapeutic today- see above in  Anticoagulation Summary. INR is already therapeutic after 18 mg over first 5 days of warfarin. Will trial 2 mg Thurs/Sun, 4 mg AOD.   Will instruct Colette Veronica to Change their warfarin regimen- see above in Anticoagulation Summary.  2. Follow up in 4 days to determine need for further dose reduction.   3. Patient declines warfarin refills.  4. Verbal and written information provided. Patient expresses understanding and has no further questions at this time.    Bay Ravi RP

## 2021-05-17 ENCOUNTER — ANTICOAGULATION VISIT (OUTPATIENT)
Dept: PHARMACY | Facility: HOSPITAL | Age: 46
End: 2021-05-17

## 2021-05-17 DIAGNOSIS — Z95.2 S/P MVR (MITRAL VALVE REPLACEMENT): Primary | ICD-10-CM

## 2021-05-17 LAB
INR PPP: 2.5 (ref 0.91–1.09)
PROTHROMBIN TIME: 30.3 SECONDS (ref 10–13.8)

## 2021-05-17 PROCEDURE — 36416 COLLJ CAPILLARY BLOOD SPEC: CPT

## 2021-05-17 PROCEDURE — 85610 PROTHROMBIN TIME: CPT

## 2021-05-18 ENCOUNTER — READMISSION MANAGEMENT (OUTPATIENT)
Dept: CALL CENTER | Facility: HOSPITAL | Age: 46
End: 2021-05-18

## 2021-05-18 NOTE — OUTREACH NOTE
CT Surgery Week 2 Survey      Responses   [facility], operated by [facility] patient discharged from?  [place]   Does the patient have one of the following disease processes/diagnoses(primary or secondary)?  Cardiothoracic surgery   Week 2 attempt successful?  Yes   Call start time  1510   Call end time  1521   Discharge diagnosis   Aortic stenosis aortic valve replacement with 21 mm Inspiris bovine pericardial valve, mitral valve replacement with 29 mm [place] Nilda epic porcine valve, Neocords x 2 to reconstruct the anterior papillary muscules, and removal of multiple intracardiac tumors. Too numerous to count.    Person spoke with today (if not patient) and relationship  Davide-THU    Meds reviewed with patient/caregiver?  Yes   Is the patient having any side effects they believe may be caused by any medication additions or changes?  No   Does the patient have all medications related to this admission filled (includes all antibiotics, pain medications, cardiac medications, etc.)  Yes   Prescription comments  Pt does not have januvia. Advised Davide to advise PCP at appt today.   Is the patient taking all medications as directed (includes completed medication regime)?  Yes   Comments regarding appointments  Coumadin [facility] 5/13/21   Does the patient have a primary care provider?   Yes   Does the patient have an appointment scheduled with their C/T surgeon?  Yes [6/10/21]   Comments regarding PCP  PCP appt is on 5/18/21   Has the patient kept scheduled appointments due by today?  Yes   Psychosocial issues?  No   Did the patient receive a copy of their discharge instructions?  Yes   Nursing interventions  Reviewed instructions with patient   What is the patient's perception of their health status since discharge?  Improving [Davide reports patient has a lot of fluid. ]   Nursing interventions  Nurse provided patient education   Is the patient /caregiver able to teach back basic post-op care?  Lifting as instructed by MD in discharge  instructions, Take showers only when approved by MD-sponge bathe until then, Drive as instructed by MD in discharge instructions   Is the patient/caregiver able to teach back signs and symptoms of incisional infection?  Increased drainage or bleeding, Pus or odor from incision, Fever   Is the patient/caregiver able to teach back steps to recovery at home?  Rest and rebuild strength, gradually increase activity, Eat a well-balance diet, Set small, achievable goals for return to baseline health   Is the patient /caregiver able to teach back the importance of cardiac rehab?  Yes   If the patient is a current smoker, are they able to teach back resources for cessation?  Not a smoker [Pt has not smoked since hospital admission on 4/28/21]   Is the patient/caregiver able to teach back the hierarchy of who to call/visit for symptoms/problems? PCP, Specialist, Home health nurse, Urgent Care, ED, 911  Yes   Week 2 call completed?  Yes          Lisseth Damon, RN

## 2021-05-18 NOTE — PROGRESS NOTES
Anticoagulation Clinic Progress Note    Anticoagulation Summary  As of 2021    INR goal:  2.0-3.0   TTR:  --   INR used for dosin.5 (2021)   Warfarin maintenance plan:  2 mg every Sun, Thu; 4 mg all other days   Weekly warfarin total:  24 mg   No change documented:  Brina Walls   Plan last modified:  Bay Ravi RPH (2021)   Next INR check:  2021   Target end date:  6/10/2021    Indications    S/P MVR (mitral valve replacement) [Z95.2]             Anticoagulation Episode Summary     INR check location:      Preferred lab:      Send INR reminders to:   [facility]    Comments:        Anticoagulation Care Providers     Provider Role Specialty Phone number    Kelli Raymond APRN Referring Cardiology [phone]          Clinic Interview:  Patient Findings     Negatives:  Signs/symptoms of thrombosis, Signs/symptoms of bleeding,   Laboratory test error suspected, Change in health, Change in alcohol use,   Change in activity, Upcoming invasive procedure, Emergency department   visit, Upcoming dental procedure, Missed doses, Extra doses, Change in   medications, Change in diet/appetite, Hospital admission, Bruising, Other   complaints      Clinical Outcomes     Negatives:  Major bleeding event, Thromboembolic event,   Anticoagulation-related hospital admission, Anticoagulation-related ED   visit, Anticoagulation-related fatality        INR History:  Anticoagulation Monitoring 2021   INR 2.3 2.5   INR Date 2021   INR Goal 2.0-3.0 2.0-3.0   Trend - Same   Last Week Total 18 mg 24 mg   Next Week Total 24 mg 24 mg   Sun 2 mg 2 mg   Mon - 4 mg   Tue - 4 mg   Wed - 4 mg   Thu 2 mg 2 mg   Fri 4 mg 4 mg   Sat 4 mg 4 mg   Visit Report - -       Plan:  1. INR is therapeutic today- see above in Anticoagulation Summary.   Will instruct Colette Veronica to continue their warfarin regimen- see above in Anticoagulation Summary.  2. Follow up in 1 weeks.  3.  Patient declines warfarin refills.  4. Verbal and written information provided. Patient expresses understanding and has no further questions at this time.    Brnia Walls

## 2021-05-24 ENCOUNTER — ANTICOAGULATION VISIT (OUTPATIENT)
Dept: PHARMACY | Facility: HOSPITAL | Age: 46
End: 2021-05-24

## 2021-05-24 ENCOUNTER — TELEPHONE (OUTPATIENT)
Dept: CARDIAC REHAB | Facility: HOSPITAL | Age: 46
End: 2021-05-24

## 2021-05-24 DIAGNOSIS — Z95.2 S/P MVR (MITRAL VALVE REPLACEMENT): Primary | ICD-10-CM

## 2021-05-24 LAB
INR PPP: 2.6 (ref 0.91–1.09)
PROTHROMBIN TIME: 30.7 SECONDS (ref 10–13.8)

## 2021-05-24 PROCEDURE — 36416 COLLJ CAPILLARY BLOOD SPEC: CPT

## 2021-05-24 PROCEDURE — 85610 PROTHROMBIN TIME: CPT

## 2021-05-24 NOTE — PROGRESS NOTES
Anticoagulation Clinic Progress Note    Anticoagulation Summary  As of 2021    INR goal:  2.0-3.0   TTR:  100.0 % (3 d)   INR used for dosin.6 (2021)   Warfarin maintenance plan:  2 mg every Sun, Thu; 4 mg all other days   Weekly warfarin total:  24 mg   No change documented:  Crissy Weller   Plan last modified:  Bay aRvi [facility] (2021)   Next INR check:  6/10/2021   Priority:  High   Target end date:  6/10/2021    Indications    S/P MVR (mitral valve replacement) [Z95.2]             Anticoagulation Episode Summary     INR check location:      Preferred lab:      Send INR reminders to:   [facility] [place]    Comments:        Anticoagulation Care Providers     Provider Role Specialty Phone number    Kelli Raymond APRN Referring Cardiology [phone]          Clinic Interview:      INR History:  Anticoagulation Monitoring 2021   INR 2.3 2.5 2.6   INR Date 2021   INR Goal 2.0-3.0 2.0-3.0 2.0-3.0   Trend - Same Same   Last Week Total 18 mg 24 mg 24 mg   Next Week Total 24 mg 24 mg 24 mg   Sun 2 mg 2 mg 2 mg   Mon - 4 mg 4 mg   Tue - 4 mg 4 mg   Wed - 4 mg 4 mg   Thu 2 mg 2 mg 2 mg   Fri 4 mg 4 mg 4 mg   Sat 4 mg 4 mg 4 mg   Visit Report - - -   Some recent data might be hidden       Plan:  1. INR is therapeutic today- see above in Anticoagulation Summary.   Will instruct Colette Veronica to continue their warfarin regimen- see above in Anticoagulation Summary.  2. Follow up in 2.5 weeks.  3. Patient declines warfarin refills.  4. Verbal and written information provided. Patient expresses understanding and has no further questions at this time.    Crissy Weller

## 2021-05-24 NOTE — TELEPHONE ENCOUNTER
Called pt secondary to referral for cardiac rehab from Dr Noguera. Noted on chart pt having swelling in her ankles and lasix was increased. VM left for pt to return my call.

## 2021-05-25 ENCOUNTER — READMISSION MANAGEMENT (OUTPATIENT)
Dept: CALL CENTER | Facility: HOSPITAL | Age: 46
End: 2021-05-25

## 2021-05-25 NOTE — OUTREACH NOTE
CT Surgery Week 3 Survey      Responses   [facility] patient discharged from?  [place]   Does the patient have one of the following disease processes/diagnoses(primary or secondary)?  Cardiothoracic surgery   Week 3 attempt successful?  No   Unsuccessful attempts  Attempt 1          Chantel Ghosh LPN

## 2021-05-26 ENCOUNTER — READMISSION MANAGEMENT (OUTPATIENT)
Dept: CALL CENTER | Facility: HOSPITAL | Age: 46
End: 2021-05-26

## 2021-05-26 NOTE — OUTREACH NOTE
CT Surgery Week 3 Survey      Responses   [facility], operated by [facility] patient discharged from?  [place]   Does the patient have one of the following disease processes/diagnoses(primary or secondary)?  Cardiothoracic surgery   Week 3 attempt successful?  Yes   Call start time  1041   Call end time  1050   Meds reviewed with patient/caregiver?  Yes   Is the patient taking all medications as directed (includes completed medication regime)?  Yes   Has the patient kept scheduled appointments due by today?  Yes   Comments  She is not sleeping well, pain at night, will be out of pain meds will call Surgeon,    What is the patient's perception of their health status since discharge?  Improving   Is the patient/caregiver able to teach back normal signs of recovery?  Pain or discomfort at incisional site, Constipation, Nausea and lack of appetite, Depression or irritability   Nursing interventions  Reassured on normal signs of recovery   Week 3 call completed?  Yes   Wrap up additional comments  she is doing ok, not sleeping well, sleeping in recliner,will beout painmeds, told to call surgeon, making list of things she will ask  for appt. on 6/10          Jacqueline Lincoln RN

## 2021-06-02 ENCOUNTER — TELEPHONE (OUTPATIENT)
Dept: CARDIAC REHAB | Facility: HOSPITAL | Age: 46
End: 2021-06-02

## 2021-06-04 DIAGNOSIS — Z95.2 S/P AVR (AORTIC VALVE REPLACEMENT): ICD-10-CM

## 2021-06-04 DIAGNOSIS — Z95.2 S/P MVR (MITRAL VALVE REPLACEMENT): ICD-10-CM

## 2021-06-04 RX ORDER — HYDROCODONE BITARTRATE AND ACETAMINOPHEN 5; 325 MG/1; MG/1
1 TABLET ORAL EVERY 4 HOURS PRN
Qty: 42 TABLET | Refills: 0 | OUTPATIENT
Start: 2021-06-04 | End: 2021-06-11

## 2021-06-10 ENCOUNTER — ANTICOAGULATION VISIT (OUTPATIENT)
Dept: PHARMACY | Facility: HOSPITAL | Age: 46
End: 2021-06-10

## 2021-06-10 ENCOUNTER — OFFICE VISIT (OUTPATIENT)
Dept: CARDIAC SURGERY | Facility: CLINIC | Age: 46
End: 2021-06-10

## 2021-06-10 VITALS
OXYGEN SATURATION: 98 % | BODY MASS INDEX: 47.46 KG/M2 | TEMPERATURE: 97.6 F | DIASTOLIC BLOOD PRESSURE: 85 MMHG | SYSTOLIC BLOOD PRESSURE: 130 MMHG | HEART RATE: 77 BPM | WEIGHT: 278 LBS | HEIGHT: 64 IN | RESPIRATION RATE: 20 BRPM

## 2021-06-10 DIAGNOSIS — Z95.2 S/P AVR (AORTIC VALVE REPLACEMENT): ICD-10-CM

## 2021-06-10 DIAGNOSIS — Z95.2 S/P MVR (MITRAL VALVE REPLACEMENT): Primary | ICD-10-CM

## 2021-06-10 DIAGNOSIS — Z95.2 S/P MVR (MITRAL VALVE REPLACEMENT): ICD-10-CM

## 2021-06-10 DIAGNOSIS — Z95.2 S/P AVR: Primary | ICD-10-CM

## 2021-06-10 LAB
INR PPP: 1.7 (ref 0.91–1.09)
PROTHROMBIN TIME: 20.2 SECONDS (ref 10–13.8)

## 2021-06-10 PROCEDURE — G0463 HOSPITAL OUTPT CLINIC VISIT: HCPCS

## 2021-06-10 PROCEDURE — 99024 POSTOP FOLLOW-UP VISIT: CPT | Performed by: NURSE PRACTITIONER

## 2021-06-10 PROCEDURE — 36416 COLLJ CAPILLARY BLOOD SPEC: CPT

## 2021-06-10 PROCEDURE — 85610 PROTHROMBIN TIME: CPT

## 2021-06-10 RX ORDER — HYDROCODONE BITARTRATE AND ACETAMINOPHEN 5; 325 MG/1; MG/1
1 TABLET ORAL EVERY 6 HOURS PRN
Qty: 28 TABLET | Refills: 0 | Status: SHIPPED | OUTPATIENT
Start: 2021-06-10 | End: 2021-06-17

## 2021-06-10 NOTE — PROGRESS NOTES
"CARDIOVASCULAR SURGERY FOLLOW-UP PROGRESS NOTE  Chief Complaint: post op        HPI:   Dear Ced Hines Jr., MD and colleagues:    It was nice to see Colette Veronica in follow up 4 weeks after surgery.  As you know, she is a 46 y.o. female with aortic stenosis, intracardiac tumors, mitral valve regurgitation who underwent On 5/3 she underwent aortic valve replacement with 21 mm Inspiris bovine pericardial valve, mitral valve replacement with 29 mm [place]ilda epic porcine valve, Neocords x 2 to reconstruct the anterior papillary muscules, and removal of multiple intracardiac tumors. She did well postoperatively and continues to do well. She comes in today complaining of incisional pain and fatigue.  Her activity level has been fair.   She is complaining of chest pain and also has some issues with her incision healing, she has started wearing a bra and I think this will help.  Typically women with large breasts can have more pain and healing issues when unsupported.  I will call her in additional pain medication for a few more doses. Her incision is healing but has been having drainage she was started on keflex and  instructed by another clinician to put neosporin on it and cover it.  I told her to stop that therapy, I think it has kept the skin macerated and there is a small opening at the distal portion, it is not big enough to pack.  I instructed her to keep the area dry and paint with betadine BID, I will see her back in one week to make sure we are going the right direction.      Physical Exam:         /85 (BP Location: Right arm, Patient Position: Sitting, Cuff Size: Adult)   Pulse 77   Temp 97.6 °F (36.4 °C) (Oral)   Resp 20   Ht 162.6 cm (64\")   Wt 126 kg (278 lb)   SpO2 98%   BMI 47.72 kg/m²   Heart:  regular rate and rhythm  Lungs:  clear to auscultation bilaterally  Extremities:  no edema  Incision(s):  sternum stable,     Assessment/Plan:     S/P AVR, MVR and intracardiac tumors. Overall, " she is doing well.    No significant post-op complications    No heavy lifting > 10 pounds for 2 more weeks  Keep incisions clean and dry  OK to drive if not taking narcotic pain medicine  OK to begin cardiac rehab  Follow-up as scheduled with cardiology  Follow-up as scheduled with PCP  Return to clinic in 1 week(s) with no new studies      Thank you for allowing me to participate in the care of your   patient.  Regards,  JAUN Orlando

## 2021-06-10 NOTE — PROGRESS NOTES
Anticoagulation Clinic Progress Note    Anticoagulation Summary  As of 6/10/2021    INR goal:  2.0-3.0   TTR:  72.4 % (2.9 wk)   INR used for dosin.7 (6/10/2021)   Warfarin maintenance plan:  2 mg every Sun; 4 mg all other days   Weekly warfarin total:  26 mg   Plan last modified:  Lisa Rodarte RPH (6/10/2021)   Next INR check:  2021   Priority:  High   Target end date:  6/10/2021    Indications    S/P MVR (mitral valve replacement) [Z95.2]             Anticoagulation Episode Summary     INR check location:      Preferred lab:      Send INR reminders to:   [facility]    Comments:        Anticoagulation Care Providers     Provider Role Specialty Phone number    Kelli Raymond APRN Referring Cardiology [phone]          Clinic Interview:  Patient Findings     Negatives:  Signs/symptoms of thrombosis, Signs/symptoms of bleeding,   Laboratory test error suspected, Change in health, Change in alcohol use,   Change in activity, Upcoming invasive procedure, Emergency department   visit, Upcoming dental procedure, Missed doses, Extra doses, Change in   medications, Change in diet/appetite, Hospital admission, Bruising, Other   complaints      Clinical Outcomes     Negatives:  Major bleeding event, Thromboembolic event,   Anticoagulation-related hospital admission, Anticoagulation-related ED   visit, Anticoagulation-related fatality        INR History:  Anticoagulation Monitoring 2021 2021 6/10/2021   INR 2.5 2.6 1.7   INR Date 2021 2021 6/10/2021   INR Goal 2.0-3.0 2.0-3.0 2.0-3.0   Trend Same Same Up   Last Week Total 24 mg 24 mg 24 mg   Next Week Total 24 mg 24 mg 26 mg   Sun 2 mg 2 mg 2 mg   Mon 4 mg 4 mg 4 mg   Tue 4 mg 4 mg 4 mg   Wed 4 mg 4 mg 4 mg   Thu 2 mg 2 mg 4 mg   Fri 4 mg 4 mg 4 mg   Sat 4 mg 4 mg 4 mg   Visit Report - - -   Some recent data might be hidden       Plan:  1. INR is Subtherapeutic today- see above in Anticoagulation Summary.  Low due to  missed dose on 6/6.  Pt has actually been taking warfarin 4mg qday instead of 2mg SuTh, 4mg AOD as previously instructed.  Suspect INR may have been high on 28mg weekly if not for missed dose 6/6 based on dosing history.  Instructed pt to take 2mg Sun, 4mg AOD this week.  Shanthi 1w.  Will instruct Colette Veronica to Change their warfarin regimen- see above in Anticoagulation Summary.  2. Follow up in 1 week.  3. They have been instructed to call if any changes in medications, doses, concerns, etc. Patient expresses understanding and has no further questions at this time.    Lisa Rodarte [facility]

## 2021-06-20 NOTE — DISCHARGE SUMMARY
Hospital Discharge    Patient Name: Colette Veronica  Age/Sex: 46 y.o. female  : 1975  MRN: [id number]    Encounter Provider: JAUN Romo  Referring Provider: Bandar Thao MD  Place of Service: [facility] CARDIOLOGY  Patient Care Team:  Bay Ravi RPH as PCP - General (Pharmacy)  Bay Ravi RPH as Pharmacist (Pharmacy)         Date of Discharge:  2021   Date of Admit: 2021    Discharge Condition: Stable  Discharge Diagnosis:    Aortic stenosis    Rheumatic mitral valve disease    Endocardial fibroelastosis (CMS/HCC)    Type 2 diabetes mellitus (CMS/HCC)    Hypertension    Hyperlipidemia    ELY (nonalcoholic steatohepatitis)    SOURAV (acute kidney injury) (CMS/HCC)      Hospital Course:   Colette Veronica is a 46 y.o. female was referred to [facility] for outpatient echocardiogram. She had been having worsening dyspnea on exertion for the past several months. Echocardiogram showed critical aortic stenosis with possible bicuspid aortic valve. She was also noted to have multiple echo densities in the left ventricle. She was directly admitted to the hospital for further evaluation. 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 She had normal coronaries on preop angiography. She was seen by GI due to CT findings consistent with cirrhosis and mild splenomegaly. On 5/3 she underwent aortic valve replacement with 21 mm Inspiris bovine pericardial valve, mitral valve replacement with 29 mm [place] epic porcine valve, Neocords x 2 to reconstruct the anterior papillary muscules, and removal of multiple intracardiac tumors. Too numerous to count. She has post op SOURAV and nephrology was consulted on . Likely due to hypotension and fluid sequestration following multi valvular heart surgery. She was treated with IVF and  Letter by Eagle Shabazz MD at      Author: Eagle Shabazz MD Service: -- Author Type: --    Filed:  Encounter Date: 3/4/2020 Status: (Other)         Mariia Tinoco  [address]  [place] [address]             March 4, 2020         Dear Ms. Tinoco,    Below are the results from your recent visit:    Resulted Orders   DXA Bone Density Scan    Narrative    3/2/2020      RE: Mariia Tinoco  YOB: 1938        Dear Eagle Shabazz,    Patient Profile:  81 y.o. female, postmenopausal, is here for the first bone density test at   this site.  History of fractures - None. Family history of osteoporosis - None.    Family history of hip fracture: None. Smoking history - No. Osteoporosis   treatment past -  Yes;  HRT. Osteoporosis treatment current - No.  Chronic   medical problems - Height loss, Hysterectomy, Oophorectomy and Premature   menopause. High risk medications -  Blood thinner (Coumadin or Heparin);    Yes, Currently.    Assessment:    1. The spine bone density is best assessed using L1-L2 with T-score of   -2.4.  2. Femoral bone densities show left femoral neck T- score of -0.5, and   right femoral neck T-score of -0.8..  3.  Previous scan was done on a different machine and is not directly   comparable with the current study.  Numerically, bone density has   increased 5.1% at L1-L2 of the AP spine  4.  The trabecular bone score reflects moderate microarchitecture    81 y.o. female with LOW BONE DENSITY (OSTEOPENIA) and previous diagnosis   of osteoporosis.  Chart indicates that she has been on Prolia since  2017.    This is not reflected on her patient history.    Recommendations:  Stability or increase in bone density is regarded as a positive response   to an antiresorptive agent.  Therefore,  continuation of Prolia is advised   with a recheck in 2 years.      Bone densitometry was performed on your patient using our [email or web address]   densitometer. The results are summarized and a  copy of the actual scans   are included for your review. In conformity with the International Society   of Clinical Densitometry's most recent position statement for DXA   interpretation (2015), the diagnosis will be made on the lowest measured   T-score of the lumbar spine, femoral neck, total proximal femur or 33%   radius. Note the change in terminology for diagnostic classification from   OSTEOPENIA to LOW BONE MASS. All trending for sequential exams will be   done using multiple vertebrae or the total proximal femur. Fracture risk   is based on the WHO Fracture Risk Assessment Tool (FRAX). If additional   information is needed or if you would like to discuss the results, please   do not hesitate to call me.       Thank you for referring this patient to [facility].   We are happy to be of service in support of you and your practice. If you   have any questions or suggestions to improve our service, please call me   at [phone].     Sincerely,     Ahmet Hammond M.D. C.C.D.  Osteoporosis Services, [facility]        Bones are looking better.  Good news.  Continue Prolia and calcium and vitamin D, as well as regular  walking, and we should recheck again in 2 years.     Please call with questions or contact us using [facility].    Sincerely,        Electronically signed by Eagle Shabazz MD        renal function returned to baseline. Eventually diuretics were restarted. She continued to get better and stronger. She struggled with constipation post operatively despite miralax and dulxalax. GI recommended tap water enema today which resulted in modest results. She had a second bowel movement that was a little better. She will continue bowel regimen at home. Abdominal xrays were technically difficult due to body habitus. She has good bowel sounds. I encouraged increase ambulation and continuation of bowel regimen at home. She was started on warfarin with plans for 6 weeks of AC per Dr. Noguera. Unfortunately we could not find a home health agency to provide her in home care and therapy. Referral to anticoagulation clinic was sent. She is stable for discharge. +  Objective:  Temp:  [97.4 °F (36.3 °C)-98.1 °F (36.7 °C)] 97.5 °F (36.4 °C)  Heart Rate:  [64-77] 67  Resp:  [18] 18  BP: (110-124)/(57-72) 111/57    Intake/Output Summary (Last 24 hours) at 5/11/2021 1541  Last data filed at 5/11/2021 0820  Gross per 24 hour   Intake 480 ml   Output 500 ml   Net -20 ml     Body mass index is 49.57 kg/m².      05/09/21  0529 05/10/21  0500 05/11/21  0701   Weight: 132 kg (291 lb 1.6 oz) 131 kg (288 lb 14.4 oz) 131 kg (288 lb 12.8 oz)     Weight change: -0.045 kg (-1.6 oz)    Physical Exam:  Constitutional: She is oriented to person, place, and time. She appears well-developed. She does not appear ill.   HENT:   Head: Normocephalic and atraumatic. Head is without contusion.   Right Ear: Hearing normal. No drainage.   Left Ear: Hearing normal. No drainage.   Nose: No nasal deformity. No epistaxis.   Eyes: Lids are normal. Right eye exhibits no exudate. Left eye exhibits no exudate.  Neck: No JVD present. Carotid bruit is not present. No tracheal deviation present. No thyroid mass and no thyromegaly present.   Cardiovascular: Normal rate, regular rhythm and distant heart sounds, no murmur heard.    Pulses:       Posterior  tibial pulses are 2+ on the right side, and 2+ on the left side.   Pulmonary/Chest: Effort normal and breath sounds normal.   Abdominal: Soft. Normal appearance and bowel sounds are normal. There is no tenderness.   Musculoskeletal: Normal range of motion.        Neurological: She is alert and oriented to person, place, and time. She has normal strength.   Skin: Skin is warm, dry and intact. No rash noted. 2+ edema legs  Psychiatric: She has a normal mood and affect. Her behavior is normal. Thought content normal.   Vitals reviewed      Procedures Performed  Procedure(s):  JACKELYN STERNOTOMY AORTIC VALVE REPLACEMENT, MITRAL VALVE REPLACEMENT, EXCISION OF BENIGN PAPILLARY FIBROELASTOMAS  AND PRP       Consults:  Consults     Date and Time Order Name Status Description    5/6/2021  8:32 AM Inpatient Nephrology Consult      4/29/2021 10:41 AM Hematology & Oncology Inpatient Consult Completed     4/29/2021  8:42 AM Inpatient Gastroenterology Consult Completed           Pertinent Test Results:  Results from last 7 days   Lab Units 05/11/21  0332 05/10/21  0318 05/09/21  0322 05/08/21  0344 05/07/21  0414 05/06/21  1620 05/06/21  1209 05/06/21  0338 05/05/21  0421   SODIUM mmol/L 133* 135* 130* 131* 130*  --  130* 128* 132*   POTASSIUM mmol/L 3.8 4.4 3.9 4.2 4.2 4.5 4.7 5.3* 5.2   CHLORIDE mmol/L 98 98 98 99 100  --  96* 97* 99   CO2 mmol/L 25.7 25.5 14.7* 19.1* 18.3*  --  17.9* 16.7* 18.6*   BUN mg/dL 34* 44* 51* 59* 66*  --  59* 54* 38*   CREATININE mg/dL 0.95 1.13* 0.98 1.16* 1.58*  --  2.10* 2.01* 1.85*   GLUCOSE mg/dL 144* 171* 151* 213* 153*  --  208* 167* 179*   CALCIUM mg/dL 8.3* 8.7 8.4* 8.3* 8.3*  --  8.8 8.6 8.4*   AST (SGOT) U/L  --   --   --   --   --   --  22  --  38*   ALT (SGPT) U/L  --   --   --   --   --   --  12  --  13         Results from last 7 days   Lab Units 05/10/21  0318 05/07/21  0414 05/06/21 0338 05/05/21  0421   WBC 10*3/mm3 10.37 9.67 13.60* 14.90*   HEMOGLOBIN g/dL 12.7 11.9* 13.0 12.4    HEMATOCRIT % 38.6 36.3 39.0 37.8   PLATELETS 10*3/mm3 166 114* 97* 78*     Results from last 7 days   Lab Units 05/11/21  0332 05/10/21  0318 05/09/21  0322 05/08/21  0344 05/05/21  0421   INR  1.43* 1.22* 1.18* 1.28* 1.31*               Invalid input(s): LDLCALC            Discharge Medications     Discharge Medications      New Medications      Instructions Start Date   Adult Aspirin Regimen 81 MG EC tablet  Generic drug: aspirin   81 mg, Oral, Daily   Start Date: May 12, 2021     ClearLax 17 GM/SCOOP powder  Generic drug: polyethylene glycol   Mix 17g (1 capful) in liquid and take by mouth Daily.   Start Date: May 12, 2021     cyclobenzaprine 5 MG tablet  Commonly known as: FLEXERIL   5 mg, Oral, Every 8 Hours PRN      furosemide 40 MG tablet  Commonly known as: LASIX   40 mg, Oral, Daily   Start Date: May 12, 2021     HYDROcodone-acetaminophen 5-325 MG per tablet  Commonly known as: NORCO   1 tablet, Oral, Every 4 Hours PRN      metoprolol tartrate 25 MG tablet  Commonly known as: LOPRESSOR   12.5 mg, Oral, Every 12 Hours Scheduled      sennosides-docusate 8.6-50 MG per tablet  Commonly known as: PERICOLACE   2 tablets, Oral, Nightly      warfarin 4 MG tablet  Commonly known as: COUMADIN   Take 4 mg daily or as instructed by anticoagulation clinic         Continue These Medications      Instructions Start Date   budesonide-formoterol 160-4.5 MCG/ACT inhaler  Commonly known as: SYMBICORT   2 puffs, Inhalation, 2 Times Daily - RT      CHANTIX STARTING MONTH RITA PO   Oral      metFORMIN  MG 24 hr tablet  Commonly known as: GLUCOPHAGE-XR   750 mg, Oral, 2 Times Daily      simvastatin 80 MG tablet  Commonly known as: ZOCOR   80 mg, Oral, Nightly      SITagliptin 100 MG tablet  Commonly known as: JANUVIA   100 mg, Oral, Daily         Stop These Medications    amLODIPine 2.5 MG tablet  Commonly known as: NORVASC     triamterene-hydrochlorothiazide 37.5-25 MG per capsule  Commonly known as: DYAZIDE       "      Discharge Diet:    Dietary Orders (From admission, onward)     Start     Ordered    05/10/21 1108  Diet Regular; Consistent Carbohydrate  Diet Effective Now     Question Answer Comment   Diet Texture / Consistency Regular    Common Modifiers Consistent Carbohydrate        05/10/21 1107                Activity at Discharge:    Activity Instructions     Gradually Increase Activity Until at Pre-Hospitalization Level             Discharge disposition: home     Discharge Instructions and Follow ups:  Future Appointments   Date Time Provider Department Center   5/13/2021  9:00 AM ANTI COAG CLINIC [facility] None   6/10/2021  1:00 PM Claire Frazier APRN [facility]     Additional Instructions for the Follow-ups that You Need to Schedule     Ambulatory Referral to Anti Coag Clinic   As directed       Select To DEPT SPEC to filter TO DEPT       Send INR reminders to the \"clinical pool\" of the TO DEPT     (ie:  [facility]  or [facility] CLINICAL POOL)     Ambulatory Referral to Cardiac Rehab   As directed      Ambulatory Referral to Home Health   As directed      Face to Face Visit Date: 5/10/2021    Follow-up provider for Plan of Care?: I will be treating the patient on an ongoing basis.  Please send me the Plan of Care for signature.    Follow-up provider: JR ANDREW JIMENEZ [[id number]]    Reason/Clinical Findings: post op open heart    Describe mobility limitations that make leaving home difficult: weakness    Nursing/Therapeutic Services Requested: Skilled Nursing    Skilled nursing orders: Post CABG care    Frequency: 1 Week 1         Call MD With Problems / Concerns   As directed      Instructions:  Call office at [phone] for any drainage, increased redness, or fever over 100.5    Order Comments: Instructions:  Call office at [phone] for any drainage, increased redness, or fever over 100.5          Discharge Follow-up with PCP   As directed       Currently Documented " PCP:    Tayo Sanon MD    PCP Phone Number:    [phone]     Follow Up Details: in 1 week         Discharge Follow-up with Specialty: Cardiologist JAUN/PA; 1 Week   As directed      Specialty: Cardiologist JAUN/PA    Follow Up: 1 Week    Follow Up Details: bring all prescription bottles to appointment, call for appointment         Discharge Follow-up with Specified Provider: JAUN Singh; 1 Week   As directed      To: JAUN Singh    Follow Up: 1 Week         Discharge Follow-up with Specified Provider: Cardiologist; 1 Month   As directed      To: Cardiologist    Follow Up: 1 Month    Follow Up Details: call for appointment, bring all medication bottles to appointment         Discharge Follow-up with Specified Provider: lCaire Gomez CT surgery JAUN; 1 Month   As directed      To: Claire Gomez CT surgery JAUN    Follow Up: 1 Month    Follow Up Details: 4-6 weeks, bring all current medications to appointment        Additional information on Labs and Follow-ups:     Ambulatory Referral to Anti Coag Clinic   Thursday 5/13/2021 at 9 A.M.           Follow-up Information     [facility] .    Specialty: Cardiac Rehabilitation  Contact information:  [address] [facility] [address] [phone]           Tayo Sanon MD .    Specialty: Family Medicine  Why: in 1 week  Contact information:  [address] [facility] [address]  [phone]                   Test Results Pending at Discharge: INR Thursday.      Kelli Raymond, APRN  05/11/21  15:41 EDT    Time: Discharge 35 min

## 2021-07-06 ENCOUNTER — OFFICE VISIT (OUTPATIENT)
Dept: CARDIAC REHAB | Facility: HOSPITAL | Age: 46
End: 2021-07-06

## 2021-07-06 DIAGNOSIS — Z98.890 S/P AORTIC VALVE REPAIR: Primary | ICD-10-CM

## 2021-07-06 DIAGNOSIS — Z98.890 S/P MITRAL VALVE REPAIR: ICD-10-CM

## 2021-07-06 PROCEDURE — 93797 PHYS/QHP OP CAR RHAB WO ECG: CPT

## 2021-07-09 ENCOUNTER — APPOINTMENT (OUTPATIENT)
Dept: CARDIAC REHAB | Facility: HOSPITAL | Age: 46
End: 2021-07-09

## 2021-07-12 ENCOUNTER — APPOINTMENT (OUTPATIENT)
Dept: CARDIAC REHAB | Facility: HOSPITAL | Age: 46
End: 2021-07-12

## 2021-07-14 ENCOUNTER — TREATMENT (OUTPATIENT)
Dept: CARDIAC REHAB | Facility: HOSPITAL | Age: 46
End: 2021-07-14

## 2021-07-14 DIAGNOSIS — Z98.890 S/P AORTIC VALVE REPAIR: Primary | ICD-10-CM

## 2021-07-14 LAB — GLUCOSE BLDC GLUCOMTR-MCNC: 278 MG/DL (ref 70–130)

## 2021-07-14 PROCEDURE — 93798 PHYS/QHP OP CAR RHAB W/ECG: CPT

## 2021-07-14 PROCEDURE — 82962 GLUCOSE BLOOD TEST: CPT

## 2021-07-15 ENCOUNTER — TELEPHONE (OUTPATIENT)
Dept: PHARMACY | Facility: HOSPITAL | Age: 46
End: 2021-07-15

## 2021-07-22 ENCOUNTER — TELEPHONE (OUTPATIENT)
Dept: PHARMACY | Facility: HOSPITAL | Age: 46
End: 2021-07-22

## 2021-07-23 ENCOUNTER — TREATMENT (OUTPATIENT)
Dept: CARDIAC REHAB | Facility: HOSPITAL | Age: 46
End: 2021-07-23

## 2021-07-23 DIAGNOSIS — Z98.890 S/P AORTIC VALVE REPAIR: Primary | ICD-10-CM

## 2021-07-23 DIAGNOSIS — Z98.890 S/P MITRAL VALVE REPAIR: ICD-10-CM

## 2021-07-23 PROCEDURE — 93798 PHYS/QHP OP CAR RHAB W/ECG: CPT

## 2021-07-26 ENCOUNTER — TREATMENT (OUTPATIENT)
Dept: CARDIAC REHAB | Facility: HOSPITAL | Age: 46
End: 2021-07-26

## 2021-07-26 DIAGNOSIS — Z98.890 S/P MITRAL VALVE REPAIR: ICD-10-CM

## 2021-07-26 DIAGNOSIS — Z98.890 S/P AORTIC VALVE REPAIR: Primary | ICD-10-CM

## 2021-07-26 PROCEDURE — 93798 PHYS/QHP OP CAR RHAB W/ECG: CPT

## 2021-07-30 ENCOUNTER — APPOINTMENT (OUTPATIENT)
Dept: CARDIAC REHAB | Facility: HOSPITAL | Age: 46
End: 2021-07-30

## 2021-07-30 ENCOUNTER — TELEPHONE (OUTPATIENT)
Dept: PHARMACY | Facility: HOSPITAL | Age: 46
End: 2021-07-30

## 2021-08-02 ENCOUNTER — TREATMENT (OUTPATIENT)
Dept: CARDIAC REHAB | Facility: HOSPITAL | Age: 46
End: 2021-08-02

## 2021-08-02 DIAGNOSIS — Z98.890 S/P AORTIC VALVE REPAIR: Primary | ICD-10-CM

## 2021-08-02 DIAGNOSIS — Z98.890 S/P MITRAL VALVE REPAIR: ICD-10-CM

## 2021-08-02 PROCEDURE — 93798 PHYS/QHP OP CAR RHAB W/ECG: CPT

## 2021-08-04 ENCOUNTER — TREATMENT (OUTPATIENT)
Dept: CARDIAC REHAB | Facility: HOSPITAL | Age: 46
End: 2021-08-04

## 2021-08-04 DIAGNOSIS — Z98.890 S/P AORTIC VALVE REPAIR: Primary | ICD-10-CM

## 2021-08-04 PROCEDURE — 93798 PHYS/QHP OP CAR RHAB W/ECG: CPT

## 2021-08-06 ENCOUNTER — APPOINTMENT (OUTPATIENT)
Dept: CARDIAC REHAB | Facility: HOSPITAL | Age: 46
End: 2021-08-06

## 2021-08-12 ENCOUNTER — TELEPHONE (OUTPATIENT)
Dept: PHARMACY | Facility: HOSPITAL | Age: 46
End: 2021-08-12

## 2021-08-30 ENCOUNTER — APPOINTMENT (OUTPATIENT)
Dept: CARDIAC REHAB | Facility: HOSPITAL | Age: 46
End: 2021-08-30

## 2021-09-01 ENCOUNTER — APPOINTMENT (OUTPATIENT)
Dept: CARDIAC REHAB | Facility: HOSPITAL | Age: 46
End: 2021-09-01

## 2021-09-03 ENCOUNTER — APPOINTMENT (OUTPATIENT)
Dept: CARDIAC REHAB | Facility: HOSPITAL | Age: 46
End: 2021-09-03

## 2021-09-08 ENCOUNTER — APPOINTMENT (OUTPATIENT)
Dept: CARDIAC REHAB | Facility: HOSPITAL | Age: 46
End: 2021-09-08

## 2021-09-10 ENCOUNTER — APPOINTMENT (OUTPATIENT)
Dept: CARDIAC REHAB | Facility: HOSPITAL | Age: 46
End: 2021-09-10

## 2021-09-13 ENCOUNTER — APPOINTMENT (OUTPATIENT)
Dept: CARDIAC REHAB | Facility: HOSPITAL | Age: 46
End: 2021-09-13

## 2021-09-15 ENCOUNTER — APPOINTMENT (OUTPATIENT)
Dept: CARDIAC REHAB | Facility: HOSPITAL | Age: 46
End: 2021-09-15

## 2021-09-17 ENCOUNTER — APPOINTMENT (OUTPATIENT)
Dept: CARDIAC REHAB | Facility: HOSPITAL | Age: 46
End: 2021-09-17

## 2021-09-20 ENCOUNTER — APPOINTMENT (OUTPATIENT)
Dept: CARDIAC REHAB | Facility: HOSPITAL | Age: 46
End: 2021-09-20

## 2021-09-22 ENCOUNTER — APPOINTMENT (OUTPATIENT)
Dept: CARDIAC REHAB | Facility: HOSPITAL | Age: 46
End: 2021-09-22

## 2021-09-24 ENCOUNTER — APPOINTMENT (OUTPATIENT)
Dept: CARDIAC REHAB | Facility: HOSPITAL | Age: 46
End: 2021-09-24

## 2021-09-27 ENCOUNTER — APPOINTMENT (OUTPATIENT)
Dept: CARDIAC REHAB | Facility: HOSPITAL | Age: 46
End: 2021-09-27

## 2021-09-27 ENCOUNTER — TELEPHONE (OUTPATIENT)
Dept: PHARMACY | Facility: HOSPITAL | Age: 46
End: 2021-09-27

## 2021-09-27 NOTE — TELEPHONE ENCOUNTER
Left message:   Colette Veronica, please call the [facility] to schedule an INR. After attempting several calls and trying to reach you by mail, we will be sending a third and final letter.   Failure to respond within 2 weeks will result in your discharge from the medication management clinic. Please call [phone] to schedule an appt.

## 2021-09-29 ENCOUNTER — APPOINTMENT (OUTPATIENT)
Dept: CARDIAC REHAB | Facility: HOSPITAL | Age: 46
End: 2021-09-29

## 2021-10-01 ENCOUNTER — APPOINTMENT (OUTPATIENT)
Dept: CARDIAC REHAB | Facility: HOSPITAL | Age: 46
End: 2021-10-01

## 2021-10-02 RX ORDER — WARFARIN SODIUM 4 MG/1
TABLET ORAL
Qty: 60 TABLET | Refills: 1 | Status: CANCELLED | OUTPATIENT
Start: 2021-10-02

## 2021-10-04 ENCOUNTER — APPOINTMENT (OUTPATIENT)
Dept: CARDIAC REHAB | Facility: HOSPITAL | Age: 46
End: 2021-10-04

## 2021-10-07 RX ORDER — WARFARIN SODIUM 4 MG/1
TABLET ORAL
Qty: 30 TABLET | Refills: 0 | Status: SHIPPED | OUTPATIENT
Start: 2021-10-07 | End: 2021-11-30

## 2021-10-14 ENCOUNTER — TELEPHONE (OUTPATIENT)
Dept: PHARMACY | Facility: HOSPITAL | Age: 46
End: 2021-10-14

## 2021-10-14 NOTE — TELEPHONE ENCOUNTER
Ms. Veronica has been non-adherent to scheduled visits for INR monitoring while taking warfarin for bioprosthetic aortic/mitral valve replacement.     She was last seen for INR check on 6/10/21 (INR subtherapeutic). Since that time she has cancelled and/or no-showed 3 appointments. She has received 3 no-show letters, the last being mailed on 9/27/21. Unfortunately, it appears she has also cancelled/no-showed her Cardiology appointments with the [facility] (7/12/21, 7/23/21).    It appears that Cardiothoracic Surgery originally recommended only 6 weeks of anticoagulation; however, as we have been unable to reach Ms. Veronica, we have been unable to discuss if she is still actively taking warfarin. It remains active on her medication list.      If we are able to reach Ms. Veronica by phone and she reports that she has continued warfarin, we will plan to advise to discontinue warfarin unless you have alternative recommendations that differ from the original documented plan of only requiring anticoagulation for 6 weeks post-op.     Thank you!  Bay

## 2021-11-30 ENCOUNTER — OFFICE VISIT (OUTPATIENT)
Dept: CARDIOLOGY | Facility: CLINIC | Age: 46
End: 2021-11-30

## 2021-11-30 ENCOUNTER — APPOINTMENT (OUTPATIENT)
Dept: PHARMACY | Facility: HOSPITAL | Age: 46
End: 2021-11-30

## 2021-11-30 ENCOUNTER — ANTICOAGULATION VISIT (OUTPATIENT)
Dept: PHARMACY | Facility: HOSPITAL | Age: 46
End: 2021-11-30

## 2021-11-30 VITALS
DIASTOLIC BLOOD PRESSURE: 86 MMHG | WEIGHT: 278 LBS | SYSTOLIC BLOOD PRESSURE: 146 MMHG | HEART RATE: 77 BPM | HEIGHT: 64 IN | BODY MASS INDEX: 47.46 KG/M2 | OXYGEN SATURATION: 99 %

## 2021-11-30 DIAGNOSIS — Z95.2 S/P AVR: ICD-10-CM

## 2021-11-30 DIAGNOSIS — D49.89 CARDIAC TUMOR, VENTRICULAR: ICD-10-CM

## 2021-11-30 DIAGNOSIS — Z95.2 S/P MVR (MITRAL VALVE REPLACEMENT): Primary | ICD-10-CM

## 2021-11-30 DIAGNOSIS — I35.0 NONRHEUMATIC AORTIC VALVE STENOSIS: Primary | ICD-10-CM

## 2021-11-30 DIAGNOSIS — Z95.2 S/P MVR (MITRAL VALVE REPLACEMENT): ICD-10-CM

## 2021-11-30 DIAGNOSIS — I10 HYPERTENSION, UNSPECIFIED TYPE: ICD-10-CM

## 2021-11-30 LAB
INR PPP: 1.1 (ref 0.91–1.09)
PROTHROMBIN TIME: 13.1 SECONDS (ref 10–13.8)

## 2021-11-30 PROCEDURE — 93000 ELECTROCARDIOGRAM COMPLETE: CPT | Performed by: NURSE PRACTITIONER

## 2021-11-30 PROCEDURE — 85610 PROTHROMBIN TIME: CPT

## 2021-11-30 PROCEDURE — 36416 COLLJ CAPILLARY BLOOD SPEC: CPT

## 2021-11-30 PROCEDURE — 99215 OFFICE O/P EST HI 40 MIN: CPT | Performed by: NURSE PRACTITIONER

## 2021-11-30 RX ORDER — SEMAGLUTIDE 1.34 MG/ML
INJECTION, SOLUTION SUBCUTANEOUS
COMMUNITY
Start: 2021-11-16 | End: 2022-11-22 | Stop reason: SDDI

## 2021-11-30 RX ORDER — CLINDAMYCIN HYDROCHLORIDE 150 MG/1
CAPSULE ORAL
COMMUNITY
Start: 2021-10-26 | End: 2022-11-22 | Stop reason: ALTCHOICE

## 2021-11-30 NOTE — PROGRESS NOTES
CARDIOLOGY        Patient Name: Colette Veronica  :1975  Age: 46 y.o.  Primary Cardiologist: Asaf Thao MD  Encounter Provider:  JAUN Montaño    Date of Service: 21          CHIEF COMPLAINT / REASON FOR OFFICE VISIT     S/P MVR (overeue BHE f/u) and S/P AVR      HISTORY OF PRESENT ILLNESS       HPI  Colette Veronica is a 46 y.o. female who presents today for routine reevaluation.     Pt has a  history significant for aortic stenosis, mitral valve disease, type 2 diabetes, hypertension, hyperlipidemia, Rosario.    Review of medical records reveals patient was hospitalized -2021.  Echocardiogram revealed critical aortic stenosis with possible bicuspid aortic valve.  Patient was found to have multiple echodensities in the left ventricle.  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  She had normal coronaries on preop angiography.  Patient was seen by GI secondary to CT findings consistent with cirrhosis and mild splenomegaly.  On 5/3 she underwent aortic valve replacement with 21 mm bovine pericardial valve, mitral valve replacement with 29 mm [place]am porcine valve.  Patient also had removal of multiple intracardiac tumors.  Patient was placed on warfarin for 6 weeks.    Unfortunately, this is the patient's first hospital follow-up visit.  It appears that she had appointment scheduled with me on both  and  for which 1 was canceled and when she no showed.  She was evaluated by CT surgery 6/10/2021 and was recommended to follow-up in their clinic in 1 week and has not scheduled follow-up.    Patient states that she has been under an extreme amount of stress lately. She states that she has returned to work but does not feel that she has the stamina to work full shifts. She is questioning if she would qualify for shortened shifts based on her heart  "condition. She states that she did participate in cardiac rehab but this was stopped secondary to lower extremity pain. She has decreased furosemide dosing from daily use to as needed only. She states that her blood pressure at home is averaging in the one fifties-one seventies but she admits that she is under an extreme amount of stress. She states that she gets very little exercise. She reports palpitations when lying flat at night. She does report some worsening fatigue. She reports that her dog is a very good support system for her. She notes that there is problems with her MIGUEL and her dog being greater than 50 lbs. She is requesting for our office to complete paperwork make her dog an emotional support dog.      The following portions of the patient's history were reviewed and updated as appropriate: allergies, current medications, past family history, past medical history, past social history, past surgical history and problem list.      VITAL SIGNS     Visit Vitals  /86 (BP Location: Left arm, Patient Position: Sitting, Cuff Size: Large Adult)   Pulse 77   Ht 162.6 cm (64\")   Wt 126 kg (278 lb)   SpO2 99%   BMI 47.72 kg/m²         Wt Readings from Last 3 Encounters:   11/30/21 126 kg (278 lb)   06/10/21 126 kg (278 lb)   05/11/21 131 kg (288 lb 12.8 oz)     Body mass index is 47.72 kg/m².      REVIEW OF SYSTEMS   Review of Systems   Constitutional: Positive for malaise/fatigue.   Cardiovascular: Positive for leg swelling and palpitations. Negative for chest pain.   Respiratory: Negative for shortness of breath.    Neurological: Negative for light-headedness.   Psychiatric/Behavioral: The patient is nervous/anxious.    All other systems reviewed and are negative.          PHYSICAL EXAMINATION     Vitals and nursing note reviewed.   Constitutional:       Appearance: Normal appearance. Well-developed. Obese.   Eyes:      Conjunctiva/sclera: Conjunctivae normal.   Neck:      Vascular: No carotid bruit. "   Pulmonary:      Breath sounds: Normal breath sounds.   Cardiovascular:      Normal rate. Regular rhythm. Normal S1 with normal intensity. Normal S2 with normal intensity.      Murmurs: There is no murmur. There is a grade 2/6 systolic murmur at the URSB and ULSB.      No gallop. No click. No rub.   Edema:     Peripheral edema absent.   Musculoskeletal: Normal range of motion. Skin:     General: Skin is warm and dry.   Neurological:      Mental Status: Alert and oriented to person, place, and time.      GCS: GCS eye subscore is 4. GCS verbal subscore is 5. GCS motor subscore is 6.   Psychiatric:         Speech: Speech normal.         Behavior: Behavior normal.         Thought Content: Thought content normal.         Judgment: Judgment normal.           REVIEWED DATA       ECG 12 Lead    Date/Time: 11/30/2021 11:03 AM  Performed by: Nunu Davis APRN  Authorized by: Nunu Davis APRN   Comparison: compared with previous ECG from 5/5/2021  Rhythm: sinus rhythm  Rate: normal  BPM: 75  Conduction: 1st degree AV block  ST Segments: ST segments normal  T Waves: T waves normal  QRS axis: normal  Other: no other findings    Clinical impression: normal ECG            Cardiac Procedures:  1. Echocardiogram 4/28/2021.  LVEF 61 to 65%.  Multiple large mobile echodensities attached to the LV free walls.  These appear to be intracardiac tumors although other entities cannot be excluded.  LV wall thickness consistent with mild LVH.  Grade 2 diastolic dysfunction.  Normal RV cavity size and systolic function.  Aortic valve is likely bicuspid with severe calcification.  Critical AS with peak gradient 96 mmHg, mean gradient 54 mmHg, calculated aortic valve area 0.56 cm².  Moderate mitral valve stenosis peak gradient 11 mmHg and mean gradient 7 mmHg.  Calculated RVSP 44 mmHg.  2. JACKELYN 4/28/2021.  Multiple abnormal findings concerning for intracardiac tumors or malignancy.  There are at least 4 rounded mobile echodensities  in the LV chamber attached to the LV walls.  2 large echodensities attached to the LV anterior wall and 2 smaller echodensities attached to the LV posterior wall.  The largest of these measures 1.3 x 0.97 cm.  Appearance is concerning for intracardiac tumors and possibly malignancy.  There are multiple large echodensities which essentially cover the aortic valve leaflets and restricted flow.  These are all similar to the echodensities visualized in the LV cavity and raise concern for intracardiac tumor or malignancy.  Mitral valve leaflets are thickened and there appears to be multiple small mobile echodensities attached to the tips of the leaflets mainly.  These are similar to the appearance of the larger echodensities in the LV chamber.  Small mobile echodensity on the ridge of the left atrial appendage.  LVEF 61-65%.  Grade 1 diastolic dysfunction.  It is unclear whether the aortic valve is bicuspid or tricuspid.  Critical aortic valve stenosis with peak gradient 136 mmHg and mean gradient 89 mmHg.  Mild mitral valve stenosis.  Mitral valve mean gradient 4 mmHg.  Mild plaques in the aortic arch and descending thoracic aorta.  3. Cardiac catheterization 5/30/2021.  Normal coronary angiography.  4. Aortic valve replacement with a 21 mm Inspiris bovine pericardial valve.  Mitral valve replacement with a 29 mm [place]am epic porcine valve.  Neocords x2 to reconstruct the anterior papillary muscles.  Removal of multiple intracardiac tumors.  Too numerous to count.  5/3/2021.    Lipid Panel    Lipid Panel 4/29/21   Total Cholesterol 178   Triglycerides 125   HDL Cholesterol 34 (A)   VLDL Cholesterol 23   LDL Cholesterol  121 (A)   LDL/HDL Ratio 3.50   (A) Abnormal value            BUN   Date Value Ref Range Status   05/11/2021 34 (H) 6 - 20 mg/dL Final   01/17/2018 10 7 - 20 mg/dL Final     Creatinine   Date Value Ref Range Status   05/11/2021 0.95 0.57 - 1.00 mg/dL Final   01/17/2018 0.7 0.7 - 1.5 mg/dL Final      Potassium   Date Value Ref Range Status   05/11/2021 3.8 3.5 - 5.2 mmol/L Final   01/17/2018 4.3 3.5 - 5.1 mmol/L Final     ALT (SGPT)   Date Value Ref Range Status   05/06/2021 12 1 - 33 U/L Final     AST (SGOT)   Date Value Ref Range Status   05/06/2021 22 1 - 32 U/L Final           ASSESSMENT & PLAN      Diagnosis Plan   1. Nonrheumatic aortic valve stenosis     2. S/P AVR  Adult Transthoracic Echo Complete W/ Cont if Necessary Per Protocol   3. S/P MVR (mitral valve replacement)  Adult Transthoracic Echo Complete W/ Cont if Necessary Per Protocol   4. Hypertension, unspecified type     5. Cardiac tumor, ventricular           SUMMARY/DISCUSSION  1. Severe aortic valve stenosis status post aortic valve replacement. Patient status post aortic valve replacement from April/May. Unfortunately this is patient's 1st hospital reevaluation visit with our clinic. She had 2 canceled appointments in July. She is doing fairly well from a valvular standpoint. She will be in need of repeat echocardiogram for initial postop surveillance, this can be scheduled in 3 months. Patient does have an appointment for a tooth to be pulled today. Her dentist has prescribed prophylactic clindamycin. Stressed importance of antibiotic prophylaxis before any dental procedures.  2. Mitral valve replacement. As above for #1  3. Hypertension. Patient blood pressure elevated in clinic today. She reports that it is frequently elevated at home. She admits lots of stress. She will increase metoprolol tartrate to 25 mg twice per day and continue to monitor blood pressure readings.  4. Intracardiac tumor status post resection. Incision healing well. Repeat echocardiogram as above for #1  5. Patient has unfortunately been lost to hospital follow-up as she canceled 1 appointment and then no showed for the reschedule appointment. She states that she was unclear on what her instructions post hospital were. Discharge summary reveals that patient should  "have been on Coumadin therapy for 6 weeks, which is now completed. Patient has continued to take Coumadin. ECG today reveals normal sinus rhythm. She no longer has need for anticoagulation and I have recommended that Coumadin be discontinued.  6. Patient's largest complaint today is lots of stress. She reports that she does not have the stamina to make it through a full day shift at her work. I have recommended [place] paperwork to request reduced hours during her shifts.  7. Patient also is requesting paperwork to make her dog a \"emotional support dog\". She states that her dog is a large support for her and aids her in her home. She reports that her MIGUEL is asking for the dog to be removed from her home as he is greater than 50 lbs. I explained to the patient that this is not paperwork that our office supplies and that she would need to check with the therapist. She became very angry when I told her this. I explained to her that I was very sorry that I understood how her dog was a support for her but that this is not something that our office is capable of doing for her. She said that I was \"closing the door in her face\". I explained that I was trying to take care of her cardiac needs including subsequent testing which is needed for her valve and better blood pressure management as well as assisting with getting her reduced hours for her work until her condition improved.  8. Patient to follow-up with Dr. Thao in 3 months. Sooner for problems or complications.    Time Spent: I spent 47 minutes caring for Colette on this date of service. This time includes time spent by me in the following activities: preparing for the visit, reviewing tests, performing a medically appropriate examination and/or evaluation, counseling and educating the patient/family/caregiver, ordering medications, tests, or procedures, referring and communicating with other health care professionals, documenting information in the medical record, " independently interpreting results and communicating that information with the patient/family/caregiver and care coordination.   I spent 2 minutes on the separately reported service of ECG. This time is not included in the time used to support the E/M service also reported today.        MEDICATIONS         Discharge Medications          Accurate as of November 30, 2021  1:52 PM. If you have any questions, ask your nurse or doctor.            Changes to Medications      Instructions Start Date   furosemide 40 MG tablet  Commonly known as: LASIX  What changed: additional instructions   40 mg, Oral, Daily      metoprolol tartrate 25 MG tablet  Commonly known as: LOPRESSOR  What changed: how much to take  Changed by: JAUN Montaño   25 mg, Oral, Every 12 Hours Scheduled         Continue These Medications      Instructions Start Date   Aspirin Adult Low Strength 81 MG EC tablet  Generic drug: aspirin   81 mg, Oral, Daily      budesonide-formoterol 160-4.5 MCG/ACT inhaler  Commonly known as: SYMBICORT   2 puffs, Inhalation, 2 Times Daily - RT      ClearLax 17 GM/SCOOP powder  Generic drug: polyethylene glycol   Mix 17g (1 capful) in liquid and take by mouth Daily.      clindamycin 150 MG capsule  Commonly known as: CLEOCIN   No dose, route, or frequency recorded.      cyclobenzaprine 5 MG tablet  Commonly known as: FLEXERIL   5 mg, Oral, Every 8 Hours PRN      metFORMIN  MG 24 hr tablet  Commonly known as: GLUCOPHAGE-XR   750 mg, Oral, 2 Times Daily      Ozempic (0.25 or 0.5 MG/DOSE) 2 MG/1.5ML solution pen-injector  Generic drug: Semaglutide(0.25 or 0.5MG/DOS)   No dose, route, or frequency recorded.      sennosides-docusate 8.6-50 MG per tablet  Commonly known as: PERICOLACE   2 tablets, Oral, Nightly         Stop These Medications    CHANTIX STARTING MONTH RITA PO  Stopped by: JAUN Montaño     SITagliptin 100 MG tablet  Commonly known as: JANUVIA  Stopped by: JAUN Montaño     warfarin  4 MG tablet  Commonly known as: COUMADIN  Stopped by: JAUN Montaño                **Dragon Disclaimer:   Much of this encounter note is an electronic transcription/translation of spoken language to printed text. The electronic translation of spoken language may permit erroneous, or at times, nonsensical words or phrases to be inadvertently transcribed. Although I have reviewed the note for such errors, some may still exist.

## 2021-11-30 NOTE — PROGRESS NOTES
Patient presented to clinic to have INR checked for dental procedure. Patients INR today was 1.1. Patient had an appointment with cardiology today and warfarin was discontinued at that time and taken off the patients medication list. Patient has completed warfarin therapy. The medication management clinic will no longer be managing the patient. It has been a pleasure to be a part of her care.

## 2022-03-02 ENCOUNTER — HOSPITAL ENCOUNTER (OUTPATIENT)
Dept: CARDIOLOGY | Facility: HOSPITAL | Age: 47
End: 2022-03-02

## 2022-03-21 ENCOUNTER — APPOINTMENT (OUTPATIENT)
Dept: CARDIOLOGY | Facility: HOSPITAL | Age: 47
End: 2022-03-21

## 2022-03-31 ENCOUNTER — OFFICE VISIT (OUTPATIENT)
Dept: CARDIOLOGY | Facility: CLINIC | Age: 47
End: 2022-03-31

## 2022-03-31 ENCOUNTER — HOSPITAL ENCOUNTER (OUTPATIENT)
Dept: CARDIOLOGY | Facility: HOSPITAL | Age: 47
Discharge: HOME | End: 2022-03-31
Admitting: NURSE PRACTITIONER

## 2022-03-31 VITALS
WEIGHT: 278 LBS | DIASTOLIC BLOOD PRESSURE: 84 MMHG | HEIGHT: 64 IN | SYSTOLIC BLOOD PRESSURE: 162 MMHG | HEART RATE: 66 BPM | BODY MASS INDEX: 47.46 KG/M2

## 2022-03-31 VITALS
SYSTOLIC BLOOD PRESSURE: 150 MMHG | HEART RATE: 68 BPM | OXYGEN SATURATION: 98 % | RESPIRATION RATE: 16 BRPM | WEIGHT: 279 LBS | BODY MASS INDEX: 47.63 KG/M2 | HEIGHT: 64 IN | DIASTOLIC BLOOD PRESSURE: 90 MMHG

## 2022-03-31 DIAGNOSIS — Z95.2 S/P MVR (MITRAL VALVE REPLACEMENT): ICD-10-CM

## 2022-03-31 DIAGNOSIS — D49.89 CARDIAC TUMOR, VENTRICULAR: ICD-10-CM

## 2022-03-31 DIAGNOSIS — Z95.2 S/P MVR (MITRAL VALVE REPLACEMENT): Primary | ICD-10-CM

## 2022-03-31 DIAGNOSIS — I10 HYPERTENSION, UNSPECIFIED TYPE: ICD-10-CM

## 2022-03-31 DIAGNOSIS — Z95.2 S/P AVR: ICD-10-CM

## 2022-03-31 DIAGNOSIS — I35.0 NONRHEUMATIC AORTIC VALVE STENOSIS: ICD-10-CM

## 2022-03-31 LAB
AORTIC DIMENSIONLESS INDEX: 0.5 (DI)
ASCENDING AORTA: 2.5 CM
BH CV ECHO MEAS - AO MAX PG: 16.6 MMHG
BH CV ECHO MEAS - AO MEAN PG: 8.8 MMHG
BH CV ECHO MEAS - AO ROOT DIAM: 2.9 CM
BH CV ECHO MEAS - AO V2 MAX: 203.6 CM/SEC
BH CV ECHO MEAS - AO V2 VTI: 46.3 CM
BH CV ECHO MEAS - AVA(I,D): 1.51 CM2
BH CV ECHO MEAS - EDV(CUBED): 39.1 ML
BH CV ECHO MEAS - EDV(MOD-SP2): 111 ML
BH CV ECHO MEAS - EDV(MOD-SP4): 91 ML
BH CV ECHO MEAS - EF(MOD-BP): 73.2 %
BH CV ECHO MEAS - EF(MOD-SP2): 75.7 %
BH CV ECHO MEAS - EF(MOD-SP4): 75.8 %
BH CV ECHO MEAS - ESV(CUBED): 7.7 ML
BH CV ECHO MEAS - ESV(MOD-SP2): 27 ML
BH CV ECHO MEAS - ESV(MOD-SP4): 22 ML
BH CV ECHO MEAS - FS: 41.9 %
BH CV ECHO MEAS - IVS/LVPW: 0.88 CM
BH CV ECHO MEAS - IVSD: 1.08 CM
BH CV ECHO MEAS - LAT PEAK E' VEL: 7.8 CM/SEC
BH CV ECHO MEAS - LV DIASTOLIC VOL/BSA (35-75): 40.4 CM2
BH CV ECHO MEAS - LV MASS(C)D: 122.7 GRAMS
BH CV ECHO MEAS - LV MAX PG: 2.7 MMHG
BH CV ECHO MEAS - LV MEAN PG: 1.58 MMHG
BH CV ECHO MEAS - LV SYSTOLIC VOL/BSA (12-30): 9.8 CM2
BH CV ECHO MEAS - LV V1 MAX: 82 CM/SEC
BH CV ECHO MEAS - LV V1 VTI: 23.3 CM
BH CV ECHO MEAS - LVIDD: 3.4 CM
BH CV ECHO MEAS - LVIDS: 1.97 CM
BH CV ECHO MEAS - LVOT AREA: 3 CM2
BH CV ECHO MEAS - LVOT DIAM: 1.95 CM
BH CV ECHO MEAS - LVPWD: 1.23 CM
BH CV ECHO MEAS - MED PEAK E' VEL: 8.2 CM/SEC
BH CV ECHO MEAS - MV A DUR: 0.19 SEC
BH CV ECHO MEAS - MV A MAX VEL: 148.1 CM/SEC
BH CV ECHO MEAS - MV DEC SLOPE: 765.4 CM/SEC2
BH CV ECHO MEAS - MV DEC TIME: 0.24 MSEC
BH CV ECHO MEAS - MV E MAX VEL: 201 CM/SEC
BH CV ECHO MEAS - MV E/A: 1.36
BH CV ECHO MEAS - MV MAX PG: 18.1 MMHG
BH CV ECHO MEAS - MV MEAN PG: 8.6 MMHG
BH CV ECHO MEAS - MV V2 VTI: 52.9 CM
BH CV ECHO MEAS - MVA(VTI): 1.32 CM2
BH CV ECHO MEAS - PA ACC TIME: 0.07 SEC
BH CV ECHO MEAS - PA PR(ACCEL): 48.1 MMHG
BH CV ECHO MEAS - PA V2 MAX: 105.6 CM/SEC
BH CV ECHO MEAS - PI END-D VEL: 164.4 CM/SEC
BH CV ECHO MEAS - PULM A REVS DUR: 0.15 SEC
BH CV ECHO MEAS - PULM A REVS VEL: 28.3 CM/SEC
BH CV ECHO MEAS - PULM DIAS VEL: 58.8 CM/SEC
BH CV ECHO MEAS - PULM SYS VEL: 44.9 CM/SEC
BH CV ECHO MEAS - RAP SYSTOLE: 3 MMHG
BH CV ECHO MEAS - RV MAX PG: 1.67 MMHG
BH CV ECHO MEAS - RV V1 MAX: 64.7 CM/SEC
BH CV ECHO MEAS - RV V1 VTI: 18.4 CM
BH CV ECHO MEAS - RVOT DIAM: 2.02 CM
BH CV ECHO MEAS - RVSP: 47 MMHG
BH CV ECHO MEAS - SI(MOD-SP2): 37.3 ML/M2
BH CV ECHO MEAS - SI(MOD-SP4): 30.7 ML/M2
BH CV ECHO MEAS - SV(LVOT): 69.8 ML
BH CV ECHO MEAS - SV(MOD-SP2): 84 ML
BH CV ECHO MEAS - SV(MOD-SP4): 69 ML
BH CV ECHO MEAS - SV(RVOT): 58.7 ML
BH CV ECHO MEAS - TAPSE (>1.6): 2 CM
BH CV ECHO MEAS - TR MAX PG: 44 MMHG
BH CV ECHO MEAS - TR MAX VEL: 331.8 CM/SEC
BH CV ECHO MEASUREMENTS AVERAGE E/E' RATIO: 25.13
BH CV XLRA - RV BASE: 2.9 CM
BH CV XLRA - RV LENGTH: 9.1 CM
BH CV XLRA - RV MID: 2.21 CM
BH CV XLRA - TDI S': 8.5 CM/SEC
LEFT ATRIUM VOLUME INDEX: 18.3 ML/M2
LV EF 2D ECHO EST: 73 %
MAXIMAL PREDICTED HEART RATE: 171 BPM
SINUS: 2.4 CM
STJ: 2.1 CM
STRESS TARGET HR: 145 BPM

## 2022-03-31 PROCEDURE — 25010000002 PERFLUTREN (DEFINITY) 8.476 MG IN SODIUM CHLORIDE (PF) 0.9 % 10 ML INJECTION: Performed by: NURSE PRACTITIONER

## 2022-03-31 PROCEDURE — 93306 TTE W/DOPPLER COMPLETE: CPT

## 2022-03-31 PROCEDURE — 99214 OFFICE O/P EST MOD 30 MIN: CPT | Performed by: NURSE PRACTITIONER

## 2022-03-31 PROCEDURE — 93306 TTE W/DOPPLER COMPLETE: CPT | Performed by: INTERNAL MEDICINE

## 2022-03-31 RX ORDER — ALBUTEROL SULFATE 90 UG/1
2 AEROSOL, METERED RESPIRATORY (INHALATION)
COMMUNITY
Start: 2021-12-31 | End: 2022-12-31

## 2022-03-31 RX ORDER — FUROSEMIDE 40 MG/1
40 TABLET ORAL DAILY
Qty: 30 TABLET | Refills: 5 | Status: SHIPPED | OUTPATIENT
Start: 2022-03-31

## 2022-03-31 RX ORDER — ASPIRIN 81 MG/1
81 TABLET ORAL DAILY
Qty: 90 TABLET | Refills: 3 | Status: SHIPPED | OUTPATIENT
Start: 2022-03-31

## 2022-03-31 RX ADMIN — PERFLUTREN 2 ML: 6.52 INJECTION, SUSPENSION INTRAVENOUS at 10:44

## 2022-03-31 NOTE — PROGRESS NOTES
CARDIOLOGY        Patient Name: Colette Veronica  :1975  Age: 47 y.o.  Primary Cardiologist: Asaf Thao MD  Encounter Provider:  JAUN Montaño    Date of Service: 21          CHIEF COMPLAINT / REASON FOR OFFICE VISIT     Heart Problem (Non rheumatic aortic valve stenosis)      HISTORY OF PRESENT ILLNESS       HPI  Colette Veronica is a 47 y.o. female who presents today for routine reevaluation.     Pt has a  history significant for aortic stenosis, mitral valve disease, type 2 diabetes, hypertension, hyperlipidemia, Rosario.    Review of medical records reveals patient was hospitalized -2021.  Echocardiogram revealed critical aortic stenosis with possible bicuspid aortic valve.  Patient was found to have multiple echodensities in the left ventricle.  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  She had normal coronaries on preop angiography.  Patient was seen by GI secondary to CT findings consistent with cirrhosis and mild splenomegaly.  On 5/3 she underwent aortic valve replacement with 21 mm bovine pericardial valve, mitral valve replacement with 29 mm [place]am porcine valve.  Patient also had removal of multiple intracardiac tumors.  Patient was placed on warfarin for 6 weeks.    Patient was evaluated by me in 2021.  This was her first appointment after hospitalization and aortic valve surgery.  At that time patient stated that she was very stressed and fatigued.  At that time a repeat echocardiogram 90 days from time of surgery was arranged and she was recommended to follow-up with Dr. Thao in 3 months.  Since that time patient has canceled 3-4 appointments.  She presents today with echocardiogram scheduled prior to appointment for reassessment.    Patient states that she has been doing well over the past 6 months.  She has now moved  "and is doing better, but does have stress that she has a difficult time managing.  She admits that she did not complete cardiac rehab, she states that she was very stressed and anxious and was not ready for rehab at the time.  She states that she now feels that she would benefit from cardiac rehab. She admits that she recently moved and has not taken her medications for 4 days as she has mis-placed them.        The following portions of the patient's history were reviewed and updated as appropriate: allergies, current medications, past family history, past medical history, past social history, past surgical history and problem list.      VITAL SIGNS     Visit Vitals  /90 (BP Location: Right arm, Patient Position: Sitting, Cuff Size: Large Adult)   Pulse 68   Resp 16   Ht 162.6 cm (64.02\")   Wt 127 kg (279 lb)   SpO2 98%   BMI 47.87 kg/m²         Wt Readings from Last 3 Encounters:   03/31/22 127 kg (279 lb)   03/31/22 126 kg (278 lb)   11/30/21 126 kg (278 lb)     Body mass index is 47.87 kg/m².      REVIEW OF SYSTEMS   Review of Systems   Constitutional: Positive for malaise/fatigue. Negative for chills, fever, weight gain and weight loss.   Cardiovascular: Positive for dyspnea on exertion. Negative for leg swelling.   Respiratory: Negative for cough, snoring and wheezing.    Hematologic/Lymphatic: Negative for bleeding problem. Does not bruise/bleed easily.   Skin: Negative for color change.   Musculoskeletal: Negative for falls, joint pain and myalgias.   Gastrointestinal: Negative for melena.   Genitourinary: Negative for hematuria.   Neurological: Negative for excessive daytime sleepiness.   Psychiatric/Behavioral: Negative for depression. The patient is not nervous/anxious.            PHYSICAL EXAMINATION     Vitals and nursing note reviewed.   Constitutional:       Appearance: Normal appearance. Well-developed. Obese.   Eyes:      Conjunctiva/sclera: Conjunctivae normal.   Neck:      Vascular: No " carotid bruit.   Pulmonary:      Breath sounds: Normal breath sounds.   Cardiovascular:      Normal rate. Regular rhythm. Normal S1 with normal intensity. Normal S2 with normal intensity.      Murmurs: There is no murmur.      No gallop. No click. No rub.   Musculoskeletal: Normal range of motion. Skin:     General: Skin is warm and dry.   Neurological:      Mental Status: Alert and oriented to person, place, and time.      GCS: GCS eye subscore is 4. GCS verbal subscore is 5. GCS motor subscore is 6.   Psychiatric:         Speech: Speech normal.         Behavior: Behavior normal.         Thought Content: Thought content normal.         Judgment: Judgment normal.           REVIEWED DATA     Procedures    Cardiac Procedures:  1. Echocardiogram 4/28/2021.  LVEF 61 to 65%.  Multiple large mobile echodensities attached to the LV free walls.  These appear to be intracardiac tumors although other entities cannot be excluded.  LV wall thickness consistent with mild LVH.  Grade 2 diastolic dysfunction.  Normal RV cavity size and systolic function.  Aortic valve is likely bicuspid with severe calcification.  Critical AS with peak gradient 96 mmHg, mean gradient 54 mmHg, calculated aortic valve area 0.56 cm².  Moderate mitral valve stenosis peak gradient 11 mmHg and mean gradient 7 mmHg.  Calculated RVSP 44 mmHg.  2. JACKELYN 4/28/2021.  Multiple abnormal findings concerning for intracardiac tumors or malignancy.  There are at least 4 rounded mobile echodensities in the LV chamber attached to the LV walls.  2 large echodensities attached to the LV anterior wall and 2 smaller echodensities attached to the LV posterior wall.  The largest of these measures 1.3 x 0.97 cm.  Appearance is concerning for intracardiac tumors and possibly malignancy.  There are multiple large echodensities which essentially cover the aortic valve leaflets and restricted flow.  These are all similar to the echodensities visualized in the LV cavity and raise  concern for intracardiac tumor or malignancy.  Mitral valve leaflets are thickened and there appears to be multiple small mobile echodensities attached to the tips of the leaflets mainly.  These are similar to the appearance of the larger echodensities in the LV chamber.  Small mobile echodensity on the ridge of the left atrial appendage.  LVEF 61-65%.  Grade 1 diastolic dysfunction.  It is unclear whether the aortic valve is bicuspid or tricuspid.  Critical aortic valve stenosis with peak gradient 136 mmHg and mean gradient 89 mmHg.  Mild mitral valve stenosis.  Mitral valve mean gradient 4 mmHg.  Mild plaques in the aortic arch and descending thoracic aorta.  3. Cardiac catheterization 5/30/2021.  Normal coronary angiography.  4. Aortic valve replacement with a 21 mm Inspiris bovine pericardial valve.  Mitral valve replacement with a 29 mm [place]am epic porcine valve.  Neocords x2 to reconstruct the anterior papillary muscles.  Removal of multiple intracardiac tumors.  Too numerous to count.  5/3/2021.  5. Echocardiogram 3/31/2022.  Bioprosthetic aortic valve with gradients within normal limits.  Bioprosthetic mitral valve with gradients within normal limits.  LVEF 73%.  Calculated RVSP 47 mmHg.    Lipid Panel    Lipid Panel 4/29/21   Total Cholesterol 178   Triglycerides 125   HDL Cholesterol 34 (A)   VLDL Cholesterol 23   LDL Cholesterol  121 (A)   LDL/HDL Ratio 3.50   (A) Abnormal value            BUN   Date Value Ref Range Status   05/11/2021 34 (H) 6 - 20 mg/dL Final   01/17/2018 10 7 - 20 mg/dL Final     Creatinine   Date Value Ref Range Status   05/11/2021 0.95 0.57 - 1.00 mg/dL Final   01/17/2018 0.7 0.7 - 1.5 mg/dL Final     Potassium   Date Value Ref Range Status   05/11/2021 3.8 3.5 - 5.2 mmol/L Final   01/17/2018 4.3 3.5 - 5.1 mmol/L Final     ALT (SGPT)   Date Value Ref Range Status   05/06/2021 12 1 - 33 U/L Final     AST (SGOT)   Date Value Ref Range Status   05/06/2021 22 1 - 32 U/L Final            ASSESSMENT & PLAN      Diagnosis Plan   1. S/P MVR (mitral valve replacement)  Ambulatory Referral to Cardiac Rehab   2. S/P AVR  Ambulatory Referral to Cardiac Rehab   3. Cardiac tumor, ventricular  Ambulatory Referral to Cardiac Rehab   4. Nonrheumatic aortic valve stenosis     5. Hypertension, unspecified type           SUMMARY/DISCUSSION  1. Severe aortic valve stenosis status post aortic valve replacement.  Gradients stable on echocardiogram performed during appointment today.  Patient reports dyspnea with exertion that I think is attributed to deconditioning as well as her weight.  She states that she did participate in cardiac rehab and then stopped going as she was very anxious and stressed and does not feel that she was ready for this.  She reports that she does feel that this could be very beneficial and she would like to see if she is a candidate.  I advised her that I am unsure given the timing of her surgery if she would still qualify but that I would be happy to submit an order to further investigate.  She was also encouraged to begin exercising.  She is aware of need for antibiotic prophylaxis before dental procedures.  2. Mitral valve replacement. As above for #1  3. Hypertension.  Blood pressure elevated in clinic today at 150/90.  Patient states that she recently moved and she has not taken any of her medications for 4 days that she cannot find the box that these medications are in.  I provided refills for cardiac medications.  Encouraged her to get back on her medical regimen as good blood pressure is very important.  4. Intracardiac tumor status post resection.  No further tumor seen on echocardiogram today.  5. Follow-up with Dr. Thao in 3 months.  Sooner for problems or complications.          MEDICATIONS         Discharge Medications          Accurate as of March 31, 2022 12:16 PM. If you have any questions, ask your nurse or doctor.            Changes to Medications       Instructions Start Date   furosemide 40 MG tablet  Commonly known as: LASIX  What changed: additional instructions   40 mg, Oral, Daily         Continue These Medications      Instructions Start Date   albuterol sulfate  (90 Base) MCG/ACT inhaler  Commonly known as: PROVENTIL HFA;VENTOLIN HFA;PROAIR HFA   2 puffs, Inhalation      ProAir  (90 Base) MCG/ACT inhaler  Generic drug: albuterol sulfate HFA   INHALE 2 PUFFS INTO THE LUNGS EVERY 4-6 HOURS AS NEEDED FOR WHEEZING OR SHORTNESS OF AIR      aspirin 81 MG EC tablet   81 mg, Oral, Daily      budesonide-formoterol 160-4.5 MCG/ACT inhaler  Commonly known as: SYMBICORT   2 puffs, Inhalation, 2 Times Daily - RT      ClearLax 17 GM/SCOOP powder  Generic drug: polyethylene glycol   Mix 17g (1 capful) in liquid and take by mouth Daily.      clindamycin 150 MG capsule  Commonly known as: CLEOCIN   No dose, route, or frequency recorded.      cyclobenzaprine 5 MG tablet  Commonly known as: FLEXERIL   5 mg, Oral, Every 8 Hours PRN      metFORMIN  MG 24 hr tablet  Commonly known as: GLUCOPHAGE-XR   750 mg, Oral, 2 Times Daily      metoprolol tartrate 25 MG tablet  Commonly known as: LOPRESSOR   25 mg, Oral, Every 12 Hours Scheduled      Ozempic (0.25 or 0.5 MG/DOSE) 2 MG/1.5ML solution pen-injector  Generic drug: Semaglutide(0.25 or 0.5MG/DOS)   No dose, route, or frequency recorded.      sennosides-docusate 8.6-50 MG per tablet  Commonly known as: PERICOLACE   2 tablets, Oral, Nightly                 **Dragon Disclaimer:   Much of this encounter note is an electronic transcription/translation of spoken language to printed text. The electronic translation of spoken language may permit erroneous, or at times, nonsensical words or phrases to be inadvertently transcribed. Although I have reviewed the note for such errors, some may still exist.

## 2022-04-15 ENCOUNTER — TELEPHONE (OUTPATIENT)
Dept: CARDIAC REHAB | Facility: HOSPITAL | Age: 47
End: 2022-04-15

## 2022-04-15 NOTE — TELEPHONE ENCOUNTER
New referral received 3/31/22 after recent office visit. Attempted to call patient several times after receiving referral Message left for patient along with contact information. Original referral was  from 5/4/21 following surgery.Patient did attend briefly from 7/6/21-8/4/21, 8 sessions. She was  discharge from program on 8/25/21 due to no call, no show.

## 2022-11-22 ENCOUNTER — OFFICE VISIT (OUTPATIENT)
Dept: CARDIOLOGY | Facility: CLINIC | Age: 47
End: 2022-11-22

## 2022-11-22 VITALS
HEART RATE: 69 BPM | HEIGHT: 63 IN | WEIGHT: 275 LBS | OXYGEN SATURATION: 98 % | SYSTOLIC BLOOD PRESSURE: 128 MMHG | BODY MASS INDEX: 48.73 KG/M2 | DIASTOLIC BLOOD PRESSURE: 86 MMHG

## 2022-11-22 DIAGNOSIS — D49.89 CARDIAC TUMOR, VENTRICULAR: Primary | ICD-10-CM

## 2022-11-22 DIAGNOSIS — Z95.2 S/P MVR (MITRAL VALVE REPLACEMENT): ICD-10-CM

## 2022-11-22 DIAGNOSIS — Z95.2 S/P AVR: ICD-10-CM

## 2022-11-22 DIAGNOSIS — F17.210 CIGARETTE NICOTINE DEPENDENCE WITHOUT COMPLICATION: ICD-10-CM

## 2022-11-22 DIAGNOSIS — I10 HYPERTENSION, UNSPECIFIED TYPE: ICD-10-CM

## 2022-11-22 PROCEDURE — 93000 ELECTROCARDIOGRAM COMPLETE: CPT | Performed by: NURSE PRACTITIONER

## 2022-11-22 PROCEDURE — 99214 OFFICE O/P EST MOD 30 MIN: CPT | Performed by: NURSE PRACTITIONER

## 2022-11-22 NOTE — PROGRESS NOTES
CARDIOLOGY        Patient Name: Colette Veronica  :1975  Age: 47 y.o.  Primary Cardiologist: Asaf Thao MD  Encounter Provider:  JAUN Montaño    Date of Service: 22        CHIEF COMPLAINT / REASON FOR OFFICE VISIT     S/P MVR (mitral valve replacement) (3 month f/u)      HISTORY OF PRESENT ILLNESS       HPI  Colette Veronica is a 47 y.o. female who presents today for 3-month reevaluation.    Pt has a  history significant for aortic stenosis, mitral valve disease, type 2 diabetes, hypertension, hyperlipidemia, Rosario.    Review of medical records reveals patient was hospitalized -2021.  Echocardiogram revealed critical aortic stenosis with possible bicuspid aortic valve.  Patient was found to have multiple echodensities in the left ventricle.  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  She had normal coronaries on preop angiography.  Patient was seen by GI secondary to CT findings consistent with cirrhosis and mild splenomegaly.  On 5/3 she underwent aortic valve replacement with 21 mm bovine pericardial valve, mitral valve replacement with 29 mm [place] porcine valve.  Patient also had removal of multiple intracardiac tumors.  Patient was placed on warfarin for 6 weeks.    Patient was evaluated by me in 2021.  This was her first appointment after hospitalization and aortic valve surgery.  At that time patient stated that she was very stressed and fatigued.  At that time a repeat echocardiogram 90 days from time of surgery was arranged and she was recommended to follow-up with Dr. Thao in 3 months.  Since that time patient has canceled 3-4 appointments.  She presents today with echocardiogram scheduled prior to appointment for reassessment.    Patient reports that she has done well since last assessment.  She reports that she is  "concerned that she has gallbladder disease.  She reports that her symptoms began over the weekend and she plans to follow with her PCP.  She is concerned about wether she would be safe to undergo surgery if needed.  She denies chest pain.  She reports that her breathing is controlled, but she does admit that she gets out of breath easy.  She admits that she is not exercising, she states that due to her condition, she is anxious and scared to exercise.  She admits generalized fatigue.  Denies heart palpitations.  She does continue to smoke and is planning to try to stop with nicotine patches.      The following portions of the patient's history were reviewed and updated as appropriate: allergies, current medications, past family history, past medical history, past social history, past surgical history and problem list.      VITAL SIGNS     Visit Vitals  /86 (BP Location: Left arm, Patient Position: Sitting, Cuff Size: Large Adult)   Pulse 69   Ht 160 cm (63\")   Wt 125 kg (275 lb)   SpO2 98%   BMI 48.71 kg/m²         Wt Readings from Last 3 Encounters:   11/22/22 125 kg (275 lb)   03/31/22 126 kg (278 lb)   03/31/22 127 kg (279 lb)     Body mass index is 48.71 kg/m².      REVIEW OF SYSTEMS   Review of Systems   Constitutional: Positive for malaise/fatigue. Negative for chills, fever, weight gain and weight loss.   Cardiovascular: Positive for dyspnea on exertion. Negative for leg swelling.   Respiratory: Negative for cough, snoring and wheezing.    Hematologic/Lymphatic: Negative for bleeding problem. Does not bruise/bleed easily.   Skin: Negative for color change.   Musculoskeletal: Negative for falls, joint pain and myalgias.   Gastrointestinal: Negative for melena.   Genitourinary: Negative for hematuria.   Neurological: Negative for excessive daytime sleepiness.   Psychiatric/Behavioral: Negative for depression. The patient is not nervous/anxious.            PHYSICAL EXAMINATION     Vitals and nursing note " reviewed.   Constitutional:       Appearance: Normal appearance. Well-developed. Obese.   Eyes:      Conjunctiva/sclera: Conjunctivae normal.   Neck:      Vascular: No carotid bruit.   Pulmonary:      Breath sounds: Normal breath sounds.   Cardiovascular:      Normal rate. Regular rhythm. Normal S1 with normal intensity. Normal S2 with normal intensity.      Murmurs: There is no murmur.      No gallop. No click. No rub.   Musculoskeletal: Normal range of motion. Skin:     General: Skin is warm and dry.   Neurological:      Mental Status: Alert and oriented to person, place, and time.      GCS: GCS eye subscore is 4. GCS verbal subscore is 5. GCS motor subscore is 6.   Psychiatric:         Speech: Speech normal.         Behavior: Behavior normal.         Thought Content: Thought content normal.         Judgment: Judgment normal.           REVIEWED DATA       ECG 12 Lead    Date/Time: 11/22/2022 12:33 PM  Performed by: Nunu Davis APRN  Authorized by: Nunu Davis APRN   Comparison: compared with previous ECG   Rhythm: sinus rhythm  Rate: normal  BPM: 69  QRS axis: normal  Other findings: non-specific ST-T wave changes    Clinical impression: non-specific ECG            Cardiac Procedures:  1. Echocardiogram 4/28/2021.  LVEF 61 to 65%.  Multiple large mobile echodensities attached to the LV free walls.  These appear to be intracardiac tumors although other entities cannot be excluded.  LV wall thickness consistent with mild LVH.  Grade 2 diastolic dysfunction.  Normal RV cavity size and systolic function.  Aortic valve is likely bicuspid with severe calcification.  Critical AS with peak gradient 96 mmHg, mean gradient 54 mmHg, calculated aortic valve area 0.56 cm².  Moderate mitral valve stenosis peak gradient 11 mmHg and mean gradient 7 mmHg.  Calculated RVSP 44 mmHg.  2. JACKELYN 4/28/2021.  Multiple abnormal findings concerning for intracardiac tumors or malignancy.  There are at least 4 rounded mobile  echodensities in the LV chamber attached to the LV walls.  2 large echodensities attached to the LV anterior wall and 2 smaller echodensities attached to the LV posterior wall.  The largest of these measures 1.3 x 0.97 cm.  Appearance is concerning for intracardiac tumors and possibly malignancy.  There are multiple large echodensities which essentially cover the aortic valve leaflets and restricted flow.  These are all similar to the echodensities visualized in the LV cavity and raise concern for intracardiac tumor or malignancy.  Mitral valve leaflets are thickened and there appears to be multiple small mobile echodensities attached to the tips of the leaflets mainly.  These are similar to the appearance of the larger echodensities in the LV chamber.  Small mobile echodensity on the ridge of the left atrial appendage.  LVEF 61-65%.  Grade 1 diastolic dysfunction.  It is unclear whether the aortic valve is bicuspid or tricuspid.  Critical aortic valve stenosis with peak gradient 136 mmHg and mean gradient 89 mmHg.  Mild mitral valve stenosis.  Mitral valve mean gradient 4 mmHg.  Mild plaques in the aortic arch and descending thoracic aorta.  3. Cardiac catheterization 5/30/2021.  Normal coronary angiography.  4. Aortic valve replacement with a 21 mm Inspiris bovine pericardial valve.  Mitral valve replacement with a 29 mm [place] epic porcine valve.  Neocords x2 to reconstruct the anterior papillary muscles.  Removal of multiple intracardiac tumors.  Too numerous to count.  5/3/2021.  5. Echocardiogram 3/31/2022.  Bioprosthetic aortic valve with gradients within normal limits.  Bioprosthetic mitral valve with gradients within normal limits.  LVEF 73%.  Calculated RVSP 47 mmHg.      BUN   Date Value Ref Range Status   05/11/2021 34 (H) 6 - 20 mg/dL Final   01/17/2018 10 7 - 20 mg/dL Final     Creatinine   Date Value Ref Range Status   05/11/2021 0.95 0.57 - 1.00 mg/dL Final   01/17/2018 0.7 0.7 - 1.5 mg/dL Final      Potassium   Date Value Ref Range Status   05/11/2021 3.8 3.5 - 5.2 mmol/L Final   01/17/2018 4.3 3.5 - 5.1 mmol/L Final     ALT (SGPT)   Date Value Ref Range Status   05/06/2021 12 1 - 33 U/L Final     AST (SGOT)   Date Value Ref Range Status   05/06/2021 22 1 - 32 U/L Final           ASSESSMENT & PLAN     Diagnoses and all orders for this visit:    1. Cardiac tumor, ventricular (Primary)  · S/p surgical extraction    2. S/P AVR  · Gradients stable on echocardiogram March 2022  · Patient denies angina, dyspnea, lightheadedness    3. S/P MVR (mitral valve replacement)  · Gradients stable on echocardiogram 2022  · Patient denies angina, dyspnea, lightheadedness    4. Hypertension, unspecified type  · Blood pressure controlled at 128/86  · Continue metoprolol tartrate 25 mg/ twice daily, furosemide 40 mg/day    5. Cigarette nicotine dependence without complication  · Patient continues to smoke.  Encourage cessation.  She states she has prescription for nicotine patches at home but does not have readiness to stop smoking yet.        Return in about 6 months (around 5/22/2023) for Dr. Means-transitioning from Dr. Thao.    Future Appointments       Provider Department Center    6/1/2023 11:40 AM Laura Means MD [facility] CARDIOLOGY [facility]                  MEDICATIONS         Discharge Medications          Accurate as of November 22, 2022 11:51 AM. If you have any questions, ask your nurse or doctor.            Changes to Medications      Instructions Start Date   metoprolol tartrate 25 MG tablet  Commonly known as: LOPRESSOR  What changed: when to take this   25 mg, Oral, Every 12 Hours Scheduled         Continue These Medications      Instructions Start Date   albuterol sulfate  (90 Base) MCG/ACT inhaler  Commonly known as: PROVENTIL HFA;VENTOLIN HFA;PROAIR HFA   2 puffs, Inhalation      ProAir  (90 Base) MCG/ACT inhaler  Generic drug: albuterol sulfate HFA   INHALE 2  PUFFS INTO THE LUNGS EVERY 4-6 HOURS AS NEEDED FOR WHEEZING OR SHORTNESS OF AIR      aspirin 81 MG EC tablet   81 mg, Oral, Daily      budesonide-formoterol 160-4.5 MCG/ACT inhaler  Commonly known as: SYMBICORT   2 puffs, Inhalation, 2 Times Daily - RT      cyclobenzaprine 5 MG tablet  Commonly known as: FLEXERIL   5 mg, Oral, Every 8 Hours PRN      furosemide 40 MG tablet  Commonly known as: LASIX   40 mg, Oral, Daily      sennosides-docusate 8.6-50 MG per tablet  Commonly known as: PERICOLACE   2 tablets, Oral, Nightly         Stop These Medications    Ozempic (0.25 or 0.5 MG/DOSE) 2 MG/1.5ML solution pen-injector  Generic drug: Semaglutide(0.25 or 0.5MG/DOS)  Stopped by: JAUN Montaño                **Dragon Disclaimer:   Much of this encounter note is an electronic transcription/translation of spoken language to printed text. The electronic translation of spoken language may permit erroneous, or at times, nonsensical words or phrases to be inadvertently transcribed. Although I have reviewed the note for such errors, some may still exist.

## 2022-12-09 ENCOUNTER — TRANSCRIBE ORDERS (OUTPATIENT)
Dept: ADMINISTRATIVE | Facility: HOSPITAL | Age: 47
End: 2022-12-09

## 2022-12-09 DIAGNOSIS — M79.605 BILATERAL LEG PAIN: Primary | ICD-10-CM

## 2022-12-09 DIAGNOSIS — M79.604 BILATERAL LEG PAIN: Primary | ICD-10-CM

## 2022-12-09 DIAGNOSIS — M79.661 BILATERAL CALF PAIN: ICD-10-CM

## 2022-12-09 DIAGNOSIS — M79.662 BILATERAL CALF PAIN: ICD-10-CM

## 2023-01-16 ENCOUNTER — HOSPITAL ENCOUNTER (OUTPATIENT)
Dept: CARDIOLOGY | Facility: HOSPITAL | Age: 48
Discharge: HOME | End: 2023-01-16
Admitting: FAMILY MEDICINE
Payer: MEDICAID

## 2023-01-16 DIAGNOSIS — M79.662 BILATERAL CALF PAIN: ICD-10-CM

## 2023-01-16 DIAGNOSIS — M79.604 BILATERAL LEG PAIN: ICD-10-CM

## 2023-01-16 DIAGNOSIS — M79.661 BILATERAL CALF PAIN: ICD-10-CM

## 2023-01-16 DIAGNOSIS — M79.605 BILATERAL LEG PAIN: ICD-10-CM

## 2023-01-16 PROCEDURE — 93922 UPR/L XTREMITY ART 2 LEVELS: CPT

## 2023-01-17 LAB
BH CV LOWER ARTERIAL LEFT ABI RATIO: 0.71
BH CV LOWER ARTERIAL LEFT DORSALIS PEDIS SYS MAX: 103
BH CV LOWER ARTERIAL LEFT GREAT TOE SYS MAX: NORMAL
BH CV LOWER ARTERIAL LEFT POST TIBIAL SYS MAX: 113
BH CV LOWER ARTERIAL LEFT TBI RATIO: NORMAL
BH CV LOWER ARTERIAL RIGHT ABI RATIO: 0.71
BH CV LOWER ARTERIAL RIGHT DORSALIS PEDIS SYS MAX: 107
BH CV LOWER ARTERIAL RIGHT GREAT TOE SYS MAX: 82
BH CV LOWER ARTERIAL RIGHT POST TIBIAL SYS MAX: 113
BH CV LOWER ARTERIAL RIGHT TBI RATIO: 0.51
MAXIMAL PREDICTED HEART RATE: 170 BPM
STRESS TARGET HR: 145 BPM
UPPER ARTERIAL LEFT ARM BRACHIAL SYS MAX: 143 MMHG
UPPER ARTERIAL RIGHT ARM BRACHIAL SYS MAX: 160 MMHG

## 2023-06-01 ENCOUNTER — OFFICE VISIT (OUTPATIENT)
Dept: CARDIOLOGY | Facility: CLINIC | Age: 48
End: 2023-06-01

## 2023-06-01 VITALS
BODY MASS INDEX: 47.84 KG/M2 | WEIGHT: 270 LBS | DIASTOLIC BLOOD PRESSURE: 80 MMHG | SYSTOLIC BLOOD PRESSURE: 144 MMHG | HEART RATE: 79 BPM | HEIGHT: 63 IN

## 2023-06-01 DIAGNOSIS — Z95.2 S/P AVR: ICD-10-CM

## 2023-06-01 DIAGNOSIS — I35.0 NONRHEUMATIC AORTIC VALVE STENOSIS: ICD-10-CM

## 2023-06-01 DIAGNOSIS — I10 PRIMARY HYPERTENSION: ICD-10-CM

## 2023-06-01 DIAGNOSIS — R09.89 BILATERAL CAROTID BRUITS: ICD-10-CM

## 2023-06-01 DIAGNOSIS — E78.2 MIXED HYPERLIPIDEMIA: ICD-10-CM

## 2023-06-01 DIAGNOSIS — I42.4 ENDOCARDIAL FIBROELASTOSIS: Primary | ICD-10-CM

## 2023-06-01 DIAGNOSIS — I05.9 RHEUMATIC MITRAL VALVE DISEASE: ICD-10-CM

## 2023-06-01 DIAGNOSIS — Z95.2 S/P MVR (MITRAL VALVE REPLACEMENT): ICD-10-CM

## 2023-06-01 DIAGNOSIS — R68.89 ABNORMAL ANKLE BRACHIAL INDEX (ABI): ICD-10-CM

## 2023-06-01 PROBLEM — I06.0 RHEUMATIC AORTIC STENOSIS: Status: ACTIVE | Noted: 2023-06-01

## 2023-06-01 PROBLEM — G51.0 BELL'S PALSY: Status: ACTIVE | Noted: 2023-06-01

## 2023-06-01 PROCEDURE — 93000 ELECTROCARDIOGRAM COMPLETE: CPT | Performed by: INTERNAL MEDICINE

## 2023-06-01 PROCEDURE — 99214 OFFICE O/P EST MOD 30 MIN: CPT | Performed by: INTERNAL MEDICINE

## 2023-06-01 PROCEDURE — 3079F DIAST BP 80-89 MM HG: CPT | Performed by: INTERNAL MEDICINE

## 2023-06-01 PROCEDURE — 3077F SYST BP >= 140 MM HG: CPT | Performed by: INTERNAL MEDICINE

## 2023-06-01 RX ORDER — FUROSEMIDE 40 MG/1
40 TABLET ORAL DAILY
Qty: 90 TABLET | Refills: 3 | Status: SHIPPED | OUTPATIENT
Start: 2023-06-01

## 2023-06-01 RX ORDER — LOSARTAN POTASSIUM 25 MG/1
25 TABLET ORAL DAILY
Qty: 90 TABLET | Refills: 3 | Status: SHIPPED | OUTPATIENT
Start: 2023-06-01

## 2023-06-01 RX ORDER — ROSUVASTATIN CALCIUM 10 MG/1
10 TABLET, COATED ORAL NIGHTLY
Qty: 90 TABLET | Refills: 3 | Status: SHIPPED | OUTPATIENT
Start: 2023-06-01

## 2023-06-01 RX ORDER — ASPIRIN 81 MG/1
81 TABLET ORAL DAILY
Qty: 90 TABLET | Refills: 3 | Status: SHIPPED | OUTPATIENT
Start: 2023-06-01

## 2023-06-01 NOTE — PROGRESS NOTES
Date of Office Visit: 2023  Encounter Provider: Laura Means MD  Place of Service: [facility] CARDIOLOGY  Patient Name: Colette Veronica  :1975      Patient ID:  Colette Veronica is a 48 y.o. 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     She was hospitalized at the end of 2021 for an abnormal echocardiogram.  She had a cardiac catheterization in 2021 which showed normal coronary arteries.  She had a CT angiogram chest abdomen pelvis in 2021 showing no evidence of aortic aneurysm, extensive aortic vascular calcifications, nonspecific mediastinal and hilar lymph nodes, cirrhotic liver and mild splenomegaly.  Cardiac MRI done 2021 showed normal left ventricular size and function, 2 mobile masses in the left ventricle that were not infiltrating the myocardium, normal right ventricular function, bicuspid aortic valve with severe aortic stenosis and no insufficiency.  On echocardiogram, she had multiple mobile left ventricular masses, fibroelastomas too numerous to count and had surgery done 5/3/2021 for removal of the masses.  She had extensive mobile masses on her aortic valve with critical rheumatic aortic stenosis and received a 21 mm Inspiris bovine pericardial aortic valve replacement.  She also had small mobile masses on the tips of the mitral valve with moderate rheumatic mitral stenosis and received a 29 mm Epic [place] porcine mitral  valve and neocords x2 on the anterior papillary muscle.  Postoperatively, she had SOURAV and was placed on warfarin for 6 weeks per Dr. Blanc.      She saw Shelby Ryan 11/22/2022.  At that time, no changes were made as she was doing fairly well.  She had an echocardiogram done 3/31/2022 showing bioprosthetic aortic valve-normal, bioprosthetic mitral valve with a mean gradient of 18.6 mmHg, RVSP 47 mmHg, mild concentric left ventricle hypertrophy, ejection fraction 73% and indeterminate diastolic function.      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    She has no chest pain.  She has mild exertional dyspnea.  She does have a lower extremity edema and pain in her legs below the knees.  She does not feel her heart racing or skipping and she is had no dizziness, syncope or falls.  Her energy is somewhat low.  She is taking her medications as directed without difficulty.  She has had no nausea or headaches.  She has no orthopnea or PND.    Past Medical History:   Diagnosis Date   • Asthma    • Bell's palsy    • COPD (chronic obstructive pulmonary disease)    • GERD (gastroesophageal reflux disease)    • Hyperlipidemia    • Hypertension    • Morbid obesity    • Type 2 diabetes mellitus          Past Surgical History:   Procedure Laterality Date   • AORTIC VALVE REPAIR/REPLACEMENT MITRAL VALVE REPAIR/REPLACEMENT N/A 5/3/2021    Procedure: JACKELYN STERNOTOMY AORTIC VALVE REPLACEMENT, MITRAL VALVE REPLACEMENT, EXCISION OF BENIGN PAPILLARY FIBROELASTOMAS  AND PRP;  Surgeon: Jr Daniel Noguera MD;  Location: [facility] OR;  Service: Cardiothoracic;  Laterality: N/A;   • CARDIAC CATHETERIZATION N/A 4/30/2021    Procedure: Left Heart Cath NO LV DO NOT CROSS AORTIC VALVE;  Surgeon: Pedro Conner MD;  Location: [facility] CATH INVASIVE LOCATION;  Service: Cardiovascular;  Laterality:  N/A;   • CARDIAC CATHETERIZATION N/A 4/30/2021    Procedure: Coronary angiography;  Surgeon: Pedro Conner MD;  Location: [facility] INVASIVE LOCATION;  Service: Cardiovascular;  Laterality: N/A;   • HAND SURGERY Right        Current Outpatient Medications on File Prior to Visit   Medication Sig Dispense Refill   • budesonide-formoterol (SYMBICORT) 160-4.5 MCG/ACT inhaler Inhale 2 puffs 2 (Two) Times a Day.     • metFORMIN (GLUCOPHAGE) 500 MG tablet Take 1 tablet by mouth Daily With Breakfast.     • metoprolol tartrate (LOPRESSOR) 25 MG tablet Take 1 tablet by mouth Every 12 (Twelve) Hours. (Patient taking differently: Take 1 tablet by mouth Daily.) 180 tablet 3   • ProAir  (90 Base) MCG/ACT inhaler INHALE 2 PUFFS INTO THE LUNGS EVERY 4-6 HOURS AS NEEDED FOR WHEEZING OR SHORTNESS OF AIR     • [DISCONTINUED] aspirin 81 MG EC tablet Take 1 tablet by mouth Daily. 90 tablet 3   • [DISCONTINUED] furosemide (LASIX) 40 MG tablet Take 1 tablet by mouth Daily. (Patient taking differently: Take 1 tablet by mouth Daily. AS NEEDED) 30 tablet 5   • [DISCONTINUED] cyclobenzaprine (FLEXERIL) 5 MG tablet Take 1 tablet by mouth Every 8 (Eight) Hours As Needed for Muscle Spasms. (Patient not taking: Reported on 6/1/2023) 60 tablet 0   • [DISCONTINUED] sennosides-docusate (PERICOLACE) 8.6-50 MG per tablet Take 2 tablets by mouth Every Night. (Patient not taking: Reported on 6/1/2023)       No current facility-administered medications on file prior to visit.       Social History     Socioeconomic History   • Marital status: Single   • Number of children: 0   Tobacco Use   • Smoking status: Some Days     Packs/day: 1.00     Types: Cigarettes     Passive exposure: Current   • Smokeless tobacco: Never   • Tobacco comments:     CAFFEINE: 24 OZ DAILY   Vaping Use   • Vaping Use: Never used   Substance and Sexual Activity   • Alcohol use: Never   • Drug use: Never   • Sexual activity: Defer           ROS    Procedures    ECG  "12 Lead    Date/Time: 6/1/2023 12:44 PM  Performed by: Laura Means MD  Authorized by: Laura Means MD   Comparison: compared with previous ECG   Similar to previous ECG  Rhythm: sinus rhythm    Clinical impression: normal ECG                Objective:      Vitals:    06/01/23 1233   BP: 144/80   Pulse: 79   Weight: 122 kg (270 lb)   Height: 160 cm (63\")     Body mass index is 47.83 kg/m².    Vitals reviewed.   Constitutional:       General: Not in acute distress.     Appearance: Well-developed. Morbidly obese. Not diaphoretic.   Eyes:      General: No scleral icterus.     Conjunctiva/sclera: Conjunctivae normal.   HENT:      Head: Normocephalic and atraumatic.   Neck:      Thyroid: No thyromegaly.      Vascular: No carotid bruit or JVD.      Lymphadenopathy: No cervical adenopathy.   Pulmonary:      Effort: Pulmonary effort is normal. No respiratory distress.      Breath sounds: Normal breath sounds. No wheezing. No rhonchi. No rales.   Chest:      Chest wall: Not tender to palpatation.   Cardiovascular:      Normal rate. Regular rhythm.      Murmurs: There is a grade 2/6 midsystolic murmur at the URSB and ULSB.      No gallop.   Pulses:     Carotid: 2+ with bruit bilaterally.     Radial: 2+ bilaterally.     Dorsalis pedis: 1+ bilaterally.     Posterior tibial: 1+ bilaterally.  Edema:     Peripheral edema present.     Ankle: bilateral 2+ edema of the ankle.  Abdominal:      General: Bowel sounds are normal. There is no distension or abdominal bruit.      Palpations: Abdomen is soft. There is no abdominal mass.      Tenderness: There is no abdominal tenderness.   Musculoskeletal:         General: No deformity.      Extremities: No clubbing present.     Cervical back: Neck supple. Skin:     General: Skin is warm and dry. There is no cyanosis.      Coloration: Skin is not pale.      Findings: No rash.   Neurological:      Mental Status: Alert and oriented to person, place, and time.      Cranial " Nerves: No cranial nerve deficit.   Psychiatric:         Judgment: Judgment normal.         Lab Review:       Assessment:      Diagnosis Plan   1. Endocardial fibroelastosis  Adult Transthoracic Echo Complete W/ Cont if Necessary Per Protocol      2. Nonrheumatic aortic valve stenosis        3. S/P MVR (mitral valve replacement)  Adult Transthoracic Echo Complete W/ Cont if Necessary Per Protocol      4. Rheumatic mitral valve disease        5. S/P AVR  Adult Transthoracic Echo Complete W/ Cont if Necessary Per Protocol      6. Abnormal ankle brachial index (CARLA)  Ambulatory Referral to Vascular Surgery      7. Mixed hyperlipidemia        8. Primary hypertension        9. Bilateral carotid bruits  Duplex Carotid Ultrasound CAR        1. 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   2. Extensive mobile masses on the aortic valve with critical aortic stenosis due to bicuspid aortic valve and rheumatic aortic stenosis, s/p 21mm bovine pericardial AVR 5/3/21  3. Small mobile masses on the tips of the mitral valve with moderate rheumatic mitral stenosis, s/p 29mm porcine valve 5/3/21  4. Morbid obesity.  Advised weight loss and exercise, she has joined the swim club to start swimming.  5. tobacco use, advised cessation, she says she will try.  6. COPD.   7. Liver cirrhosis - due to ELY.   8. Mild splenomegaly, likely related to cirrhosis  9. History of pulmonary embolism in approximately 2000 (unprovoked)  10. DM type 2   11. Abnormal ankle-brachial indices, refer to vascular surgery.  12. Bilateral carotid bruits, set up carotid duplex study.  13. History of Bell's palsy affecting the right side of her face.       Plan:       Repeat echocardiogram to reevaluate mitral valve as it was slightly stenotic at last echo, set up carotid duplex study, start losartan 25 mg daily for hypertension, maintain  rosuvastatin for hyperlipidemia, see Deepa in 3 months.  May need evaluation for ADEN.

## (undated) DEVICE — IRRIGATOR BULB ASEPTO 60CC STRL

## (undated) DEVICE — SUT PROLN 2/0 V5 48IN D9694

## (undated) DEVICE — SUT PROLN 4/0 BB D/A 36IN 8581H

## (undated) DEVICE — CONTAINER,SPECIMEN,OR STERILE,4OZ: Brand: MEDLINE

## (undated) DEVICE — BLAKE SILICONE DRAINS CARDIO CONNECTOR 2:1: Brand: BLAKE

## (undated) DEVICE — DRAIN,WOUND,ROUND,24FR,5/16",FULL-FLUTED: Brand: MEDLINE

## (undated) DEVICE — CATH DIAG IMPULSE PIG 5F 100CM

## (undated) DEVICE — LOU OPEN HEART DR POLLOCK: Brand: MEDLINE INDUSTRIES, INC.

## (undated) DEVICE — PICO 7 10CM X 30CM: Brand: PICO™ 7

## (undated) DEVICE — Device

## (undated) DEVICE — GLV SURG BIOGEL SENSR LTX PF SZ7.5

## (undated) DEVICE — TRY CATH URO TEMP SENSR 16F350ML LF

## (undated) DEVICE — DRSNG SURESITE WNDW 2.38X2.75

## (undated) DEVICE — GLV SURG BIOGEL LTX PF 6 1/2

## (undated) DEVICE — MEDICINE CUP, GRADUATED, STER: Brand: MEDLINE

## (undated) DEVICE — CANN ART EOPA 3D NV W/CONN 22F

## (undated) DEVICE — CATH VENT DLP W/CONN MALL NOVNT SILICON 16FR 16IN

## (undated) DEVICE — DRP SLUSH WARMR MACH CIR 44X44IN

## (undated) DEVICE — SOL ISO/ALC RUB 70PCT 4OZ

## (undated) DEVICE — SYR LUERLOK 20CC BX/50

## (undated) DEVICE — SENSR CERBRL O2 PK/2

## (undated) DEVICE — CATH DIAG IMPULSE FR5 5F 100CM

## (undated) DEVICE — OASIS DRAIN, DUAL, IN-LINE, ATS COMPATIBLE: Brand: OASIS

## (undated) DEVICE — Device: Brand: LEVEL 1

## (undated) DEVICE — BIOPATCH™ ANTIMICROBIAL DRESSING WITH CHLORHEXIDINE GLUCONATE IS A HYDROPHILLIC POLYURETHANE ABSORPTIVE FOAM WITH CHLORHEXIDINE GLUCONATE (CHG) WHICH INHIBITS BACTERIAL GROWTH UNDER THE DRESSING. THE DRESSING IS INTENDED TO BE USED TO ABSORB EXUDATE, COVER A WOUND CAUSED BY VASCULAR AND NONVASCULAR PERCUTANEOUS MEDICAL DEVICES DURING SURGERY, AS WELL AS REDUCE LOCAL INFECTION AND COLONIZATION OF MICROORGANISMS.: Brand: BIOPATCH

## (undated) DEVICE — HEMOCONCENTRATOR PERFUS LPS06

## (undated) DEVICE — PK SUT OPN HEART POLLOCK CUST

## (undated) DEVICE — CATHETER,URETHRAL,REDRUBBER,STERILE,20FR: Brand: MEDLINE

## (undated) DEVICE — PK CATH CARD 40

## (undated) DEVICE — CANN VESL CORNRY 14FR

## (undated) DEVICE — 28 FR STRAIGHT – SOFT PVC CATHETER: Brand: PVC THORACIC CATHETERS

## (undated) DEVICE — PK PERFUS CUST W/CARDIOPLEGIA

## (undated) DEVICE — PK ATS CUST W CARDIOTOMY RESEVOIR

## (undated) DEVICE — GLV SURG BIOGEL LTX PF 7

## (undated) DEVICE — CONN REDUCING CANN/PUMP 1/2X3/8X3/8

## (undated) DEVICE — CATH DIAG IMPULSE FL3.5 5F 100CM

## (undated) DEVICE — GLIDESHEATH SLENDER STAINLESS STEEL KIT: Brand: GLIDESHEATH SLENDER

## (undated) DEVICE — GLV SURG BIOGEL M LTX PF 7 1/2

## (undated) DEVICE — GW EMR FIX EXCHG J STD .035 3MM 260CM

## (undated) DEVICE — SCANLAN® SUTURE BOOT™ INSTRUMENT JAW COVERS - ORIGINAL YELLOW, STANDARD PKG (5 PAIR/CARTRIDGE, 1 CARTRIDGE/PKG): Brand: SCANLAN® SUTURE BOOT™ INSTRUMENT JAW COVERS

## (undated) DEVICE — SUT PROLN 3/0 SH D/A 36IN 8522H

## (undated) DEVICE — KT MANIFLD CARDIAC

## (undated) DEVICE — DRSNG WND GEL FIBR OPTICELL AG PLS W/SLV LF 4X5IN  STRL

## (undated) DEVICE — SYS PERFUS SEP PLATLT W TIPS CUST

## (undated) DEVICE — ST. SORBAVIEW ULTIMATE IJ SYSTEM A,C: Brand: CENTURION

## (undated) DEVICE — SPNG GZ WOVN 4X4IN 12PLY 10/BX STRL

## (undated) DEVICE — PK HEART OPN 40

## (undated) DEVICE — SUT PROLN 5/0 RB1 D/A 36IN 8556H

## (undated) DEVICE — CLAMP INSERT: Brand: STEALTH® CLAMP INSERT

## (undated) DEVICE — SUT SILK 0 CT1 CR8 18IN C021D